# Patient Record
Sex: MALE | Race: WHITE | NOT HISPANIC OR LATINO | Employment: OTHER | URBAN - METROPOLITAN AREA
[De-identification: names, ages, dates, MRNs, and addresses within clinical notes are randomized per-mention and may not be internally consistent; named-entity substitution may affect disease eponyms.]

---

## 2018-09-20 DIAGNOSIS — I51.9 HEART DISEASE: ICD-10-CM

## 2018-09-27 LAB — EKG IMPRESSION: NORMAL

## 2019-12-03 ENCOUNTER — TELEPHONE (OUTPATIENT)
Dept: CARDIOLOGY | Facility: MEDICAL CENTER | Age: 64
End: 2019-12-03

## 2019-12-03 NOTE — TELEPHONE ENCOUNTER
Spoke with pt's wife who stated that the pt has seen two cardiac doctors but they aren't sure of the names but that they will try and remember by the time the appt is.

## 2019-12-09 ENCOUNTER — OFFICE VISIT (OUTPATIENT)
Dept: CARDIOLOGY | Facility: MEDICAL CENTER | Age: 64
End: 2019-12-09
Payer: MEDICARE

## 2019-12-09 VITALS
DIASTOLIC BLOOD PRESSURE: 89 MMHG | OXYGEN SATURATION: 94 % | BODY MASS INDEX: 22.57 KG/M2 | WEIGHT: 166.67 LBS | HEIGHT: 72 IN | HEART RATE: 65 BPM | SYSTOLIC BLOOD PRESSURE: 138 MMHG

## 2019-12-09 DIAGNOSIS — I10 HYPERTENSION, ESSENTIAL: ICD-10-CM

## 2019-12-09 DIAGNOSIS — E78.5 DYSLIPIDEMIA: ICD-10-CM

## 2019-12-09 DIAGNOSIS — I71.40 ABDOMINAL AORTIC ANEURYSM (AAA) WITHOUT RUPTURE (HCC): Chronic | ICD-10-CM

## 2019-12-09 DIAGNOSIS — N18.30 STAGE 3 CHRONIC KIDNEY DISEASE (HCC): ICD-10-CM

## 2019-12-09 DIAGNOSIS — I65.22 STENOSIS OF LEFT CAROTID ARTERY: ICD-10-CM

## 2019-12-09 DIAGNOSIS — I25.10 CORONARY ARTERY DISEASE INVOLVING NATIVE CORONARY ARTERY OF NATIVE HEART WITHOUT ANGINA PECTORIS: ICD-10-CM

## 2019-12-09 LAB — EKG IMPRESSION: NORMAL

## 2019-12-09 PROCEDURE — 99204 OFFICE O/P NEW MOD 45 MIN: CPT | Performed by: INTERNAL MEDICINE

## 2019-12-09 PROCEDURE — 93000 ELECTROCARDIOGRAM COMPLETE: CPT | Performed by: INTERNAL MEDICINE

## 2019-12-09 RX ORDER — RANITIDINE 150 MG/1
TABLET ORAL
COMMUNITY
Start: 2019-10-09 | End: 2023-09-22

## 2019-12-09 RX ORDER — ATORVASTATIN CALCIUM 40 MG/1
TABLET, FILM COATED ORAL
COMMUNITY
Start: 2019-10-16

## 2019-12-09 RX ORDER — CLOPIDOGREL BISULFATE 75 MG/1
TABLET ORAL
COMMUNITY
Start: 2019-10-16

## 2019-12-09 RX ORDER — CARVEDILOL 25 MG/1
25 TABLET ORAL 2 TIMES DAILY WITH MEALS
COMMUNITY

## 2019-12-09 RX ORDER — ISOSORBIDE MONONITRATE 120 MG/1
TABLET, EXTENDED RELEASE ORAL
COMMUNITY
Start: 2019-10-15

## 2019-12-09 RX ORDER — LEVOTHYROXINE SODIUM 112 UG/1
TABLET ORAL
COMMUNITY
Start: 2019-09-13

## 2019-12-09 RX ORDER — CLONIDINE HYDROCHLORIDE 0.3 MG/1
TABLET ORAL
COMMUNITY
Start: 2019-11-12

## 2019-12-09 RX ORDER — NITROGLYCERIN 0.4 MG/1
0.4 TABLET SUBLINGUAL
COMMUNITY

## 2019-12-09 RX ORDER — CHLORTHALIDONE 25 MG/1
TABLET ORAL
COMMUNITY
Start: 2019-09-11 | End: 2019-12-09

## 2019-12-09 RX ORDER — FERROUS SULFATE 325(65) MG
325 TABLET ORAL DAILY
COMMUNITY

## 2019-12-09 RX ORDER — MULTIVIT-MIN/IRON/FOLIC ACID/K 18-600-40
CAPSULE ORAL
COMMUNITY

## 2019-12-09 RX ORDER — NIFEDIPINE 30 MG/1
TABLET, EXTENDED RELEASE ORAL
COMMUNITY
Start: 2019-11-12

## 2019-12-09 NOTE — PROGRESS NOTES
CARDIOLOGY NEW PATIENT CONSULTATION    PCP: Pcp Pt States None    1. Coronary artery disease involving native coronary artery of native heart with other forms of angina pectoris  He has typical and atypical chest symptoms, most typical is the dyspnea on exertion  -Echo and stress MPI ordered  -Continue current medications  2. Abdominal aortic aneurysm (AAA) without rupture (HCC)  Comments: 4.3 cm November 2019  Surveillance imaging recommended annually    3. Hypertension, essential  Well-controlled.  Continue current medications.    4. Dyslipidemia  Well-controlled.  Continue atorvastatin 40 mg daily    5. Stage 3 chronic kidney disease (HCC)    6.  Carotid stenosis, left  - Carotid duplex ordered    7.  Claudication, bilateral  -Lower extremity duplex ordered    Follow up with González Ellsworth M.D. in 1 year    Chief Complaint   Patient presents with   • Abdominal Aortic Aneurysm       History: Pb Peters is a 64 y.o. male with a past medical history of hypertension, hyperlipidemia, former tobacco use, coronary artery disease and Peripheral artery disease/carotid stenosis presenting for consultation regarding abdominal aortic aneurysm.  He has been cared for by practitioners in [place] as well as [place].  He is now needing a cardiologist in [place].  Recently a 4.3 cm abdominal aortic aneurysm was identified.  He is not having any symptoms.  He does report coronary artery revascularization about 1 year ago and continues on dual antiplatelet therapy without any problems.  He does continue to experience chest pain when being emotionally stressed but also finds it difficult to walk more than 100 feet without shortness of breath.  He also feels bilateral calf pain when walking.  He reports a history of carotid stenosis which had been followed previously and a right carotid endarterectomy.    He is not having any orthopnea, PND, chest pain he does not report any new rashes or depression.    ROS:  All other systems  reviewed and negative except as per the HPI    PE:  /94 (BP Location: Right arm, Patient Position: Sitting, BP Cuff Size: Adult)   Pulse 65   Ht 1.829 m (6')   Wt 75.6 kg (166 lb 10.7 oz)   SpO2 94%   BMI 22.60 kg/m²   GEN: Well appearing  HEENT: Symmetric face. Anicteric sclerae. Moist mucus membranes  NECK: No JVD. No lymphadenopathy  CARDIAC: Normal PMI, regular, normal S1, S2.  VASCULATURE: Normal carotid amplitude without bruit.   RESP: Clear to auscultation bilaterally  ABD: Soft, non-tender, non-distended  EXT: No edema, no clubbing or cyanosis  SKIN: Warm and dry  NEURO: No gross deficits  PSYCH: Appropriate affect, participates in conversation    History reviewed. No pertinent past medical history.  History reviewed. No pertinent surgical history.  Allergies   Allergen Reactions   • Lisinopril      Per pt damages kidneys     Outpatient Encounter Medications as of 12/9/2019   Medication Sig Dispense Refill   • atorvastatin (LIPITOR) 40 MG Tab      • cloNIDine (CATAPRESS) 0.3 MG Tab      • clopidogrel (PLAVIX) 75 MG Tab      • isosorbide mononitrate (IMDUR) 120 MG CR tablet      • levothyroxine (SYNTHROID) 112 MCG Tab      • NIFEdipine SR (PROCARDIA-XL) 30 MG tablet      • raNITidine (ZANTAC) 150 MG Tab      • aspirin EC (ECOTRIN) 81 MG Tablet Delayed Response Take 81 mg by mouth every day.     • carvedilol (COREG) 25 MG Tab Take 25 mg by mouth 2 times a day, with meals.     • Umeclidinium Bromide (INCRUSE ELLIPTA) 62.5 MCG/INH AEROSOL POWDER, BREATH ACTIVATED Inhale  by mouth.     • nitroglycerin (NITROSTAT) 0.4 MG SL Tab Place 0.4 mg under tongue every 5 minutes as needed for Chest Pain.     • Cholecalciferol (VITAMIN D) 2000 units Cap Take  by mouth.     • ferrous sulfate 325 (65 Fe) MG tablet Take 325 mg by mouth every day.     • ACETAMINOPHEN PO Take  by mouth.     • [DISCONTINUED] chlorthalidone (HYGROTON) 25 MG Tab        No facility-administered encounter medications on file as of  12/9/2019.      Social History     Socioeconomic History   • Marital status: Unknown     Spouse name: Not on file   • Number of children: Not on file   • Years of education: Not on file   • Highest education level: Not on file   Occupational History   • Not on file   Social Needs   • Financial resource strain: Not on file   • Food insecurity:     Worry: Not on file     Inability: Not on file   • Transportation needs:     Medical: Not on file     Non-medical: Not on file   Tobacco Use   • Smoking status: Former Smoker     Packs/day: 0.00   • Smokeless tobacco: Never Used   Substance and Sexual Activity   • Alcohol use: Not on file   • Drug use: Not on file   • Sexual activity: Not on file   Lifestyle   • Physical activity:     Days per week: Not on file     Minutes per session: Not on file   • Stress: Not on file   Relationships   • Social connections:     Talks on phone: Not on file     Gets together: Not on file     Attends Tenriism service: Not on file     Active member of club or organization: Not on file     Attends meetings of clubs or organizations: Not on file     Relationship status: Not on file   • Intimate partner violence:     Fear of current or ex partner: Not on file     Emotionally abused: Not on file     Physically abused: Not on file     Forced sexual activity: Not on file   Other Topics Concern   • Not on file   Social History Narrative   • Not on file       History reviewed. No pertinent family history.      Studies  No results found for: CHOLSTRLTOT, LDL, HDL, TRIGLYCERIDE    No results found for: SODIUM, POTASSIUM, CHLORIDE, CO2, GLUCOSE, BUN, CREATININE, BUNCREATRAT, GLOMRATE  No results found for: ALKPHOSPHAT, ASTSGOT, ALTSGPT, TBILIRUBIN     For this encounter I directly reviewed ECG tracings and medical records I agree with the interpretations in the electronic health record

## 2019-12-17 ENCOUNTER — HOSPITAL ENCOUNTER (OUTPATIENT)
Dept: RADIOLOGY | Facility: MEDICAL CENTER | Age: 64
End: 2019-12-17

## 2020-01-06 ENCOUNTER — HOSPITAL ENCOUNTER (OUTPATIENT)
Dept: CARDIOLOGY | Facility: MEDICAL CENTER | Age: 65
End: 2020-01-06
Attending: INTERNAL MEDICINE
Payer: MEDICARE

## 2020-01-06 ENCOUNTER — HOSPITAL ENCOUNTER (OUTPATIENT)
Dept: RADIOLOGY | Facility: MEDICAL CENTER | Age: 65
End: 2020-01-06
Attending: INTERNAL MEDICINE
Payer: MEDICARE

## 2020-01-06 DIAGNOSIS — I71.40 ABDOMINAL AORTIC ANEURYSM (AAA) WITHOUT RUPTURE (HCC): Chronic | ICD-10-CM

## 2020-01-06 DIAGNOSIS — I10 HYPERTENSION, ESSENTIAL: ICD-10-CM

## 2020-01-06 DIAGNOSIS — I25.10 CORONARY ARTERY DISEASE INVOLVING NATIVE CORONARY ARTERY OF NATIVE HEART WITHOUT ANGINA PECTORIS: ICD-10-CM

## 2020-01-06 DIAGNOSIS — E78.5 DYSLIPIDEMIA: ICD-10-CM

## 2020-01-06 LAB
LV EJECT FRACT  99904: 60
LV EJECT FRACT MOD 2C 99903: 71.56
LV EJECT FRACT MOD 4C 99902: 73.57
LV EJECT FRACT MOD BP 99901: 71.09

## 2020-01-06 PROCEDURE — 78452 HT MUSCLE IMAGE SPECT MULT: CPT | Mod: 26 | Performed by: INTERNAL MEDICINE

## 2020-01-06 PROCEDURE — A9502 TC99M TETROFOSMIN: HCPCS

## 2020-01-06 PROCEDURE — 700111 HCHG RX REV CODE 636 W/ 250 OVERRIDE (IP)

## 2020-01-06 PROCEDURE — 93306 TTE W/DOPPLER COMPLETE: CPT | Mod: 26 | Performed by: INTERNAL MEDICINE

## 2020-01-06 PROCEDURE — 93306 TTE W/DOPPLER COMPLETE: CPT

## 2020-01-06 PROCEDURE — 93018 CV STRESS TEST I&R ONLY: CPT | Performed by: INTERNAL MEDICINE

## 2020-01-06 RX ORDER — REGADENOSON 0.08 MG/ML
0.4 INJECTION, SOLUTION INTRAVENOUS ONCE
Status: COMPLETED | OUTPATIENT
Start: 2020-01-06 | End: 2020-01-06

## 2020-01-06 RX ORDER — REGADENOSON 0.08 MG/ML
INJECTION, SOLUTION INTRAVENOUS
Status: COMPLETED
Start: 2020-01-06 | End: 2020-01-06

## 2020-01-06 RX ADMIN — REGADENOSON 0.4 MG: 0.08 INJECTION, SOLUTION INTRAVENOUS at 14:56

## 2020-01-07 ENCOUNTER — HOSPITAL ENCOUNTER (OUTPATIENT)
Dept: RADIOLOGY | Facility: MEDICAL CENTER | Age: 65
End: 2020-01-07
Attending: INTERNAL MEDICINE
Payer: MEDICARE

## 2020-01-07 ENCOUNTER — TELEPHONE (OUTPATIENT)
Dept: CARDIOLOGY | Facility: MEDICAL CENTER | Age: 65
End: 2020-01-07

## 2020-01-07 DIAGNOSIS — E78.5 DYSLIPIDEMIA: ICD-10-CM

## 2020-01-07 DIAGNOSIS — I65.22 STENOSIS OF LEFT CAROTID ARTERY: ICD-10-CM

## 2020-01-07 DIAGNOSIS — Z98.890 H/O CAROTID ENDARTERECTOMY: ICD-10-CM

## 2020-01-07 DIAGNOSIS — I71.40 ABDOMINAL AORTIC ANEURYSM (AAA) WITHOUT RUPTURE (HCC): ICD-10-CM

## 2020-01-07 DIAGNOSIS — I25.10 CORONARY ARTERY DISEASE INVOLVING NATIVE CORONARY ARTERY OF NATIVE HEART WITHOUT ANGINA PECTORIS: ICD-10-CM

## 2020-01-07 DIAGNOSIS — I71.40 ABDOMINAL AORTIC ANEURYSM (AAA) WITHOUT RUPTURE (HCC): Chronic | ICD-10-CM

## 2020-01-07 DIAGNOSIS — I10 HYPERTENSION, ESSENTIAL: ICD-10-CM

## 2020-01-07 PROCEDURE — 93922 UPR/L XTREMITY ART 2 LEVELS: CPT

## 2020-01-07 PROCEDURE — 93880 EXTRACRANIAL BILAT STUDY: CPT

## 2020-01-07 PROCEDURE — 93925 LOWER EXTREMITY STUDY: CPT

## 2020-01-07 NOTE — TELEPHONE ENCOUNTER
JENELLE Perez called from the vascular lab, patient is there now and she needs a new order for the carotid ultrasound. She said someone at the hospital accidentally cancelled the order. She said the appt is still there, but the order got cancelled. She would like to get it done today because the patient lives 4 hours away. She can be reached at [phone].

## 2020-01-07 NOTE — TELEPHONE ENCOUNTER
Per BE 12/9/19 note, carotid duplex was ordered for left carotid stenosis, but unable to see the order. Called Chris in vascular lab. She is unsure how the order was cancelled, but it appears someone on the inpatient side of things accidentally cancelled it. New order placed.

## 2020-01-08 PROCEDURE — 93880 EXTRACRANIAL BILAT STUDY: CPT | Mod: 26 | Performed by: INTERNAL MEDICINE

## 2020-01-08 PROCEDURE — 93922 UPR/L XTREMITY ART 2 LEVELS: CPT | Mod: 26 | Performed by: INTERNAL MEDICINE

## 2020-01-08 PROCEDURE — 93925 LOWER EXTREMITY STUDY: CPT | Mod: 26 | Performed by: INTERNAL MEDICINE

## 2022-01-18 DIAGNOSIS — R06.02 SOB (SHORTNESS OF BREATH): Primary | ICD-10-CM

## 2022-01-19 LAB — EKG IMPRESSION: NORMAL

## 2022-01-19 PROCEDURE — 93010 ELECTROCARDIOGRAM REPORT: CPT | Performed by: INTERNAL MEDICINE

## 2022-12-19 DIAGNOSIS — R06.02 SOB (SHORTNESS OF BREATH): Primary | ICD-10-CM

## 2022-12-21 LAB
EKG IMPRESSION: NORMAL
EKG IMPRESSION: NORMAL

## 2023-08-07 ENCOUNTER — HOSPITAL ENCOUNTER (OUTPATIENT)
Dept: RADIOLOGY | Facility: MEDICAL CENTER | Age: 68
End: 2023-08-07
Payer: MEDICARE

## 2023-08-29 ENCOUNTER — TELEPHONE (OUTPATIENT)
Dept: VASCULAR SURGERY | Facility: MEDICAL CENTER | Age: 68
End: 2023-08-29

## 2023-08-29 ENCOUNTER — OFFICE VISIT (OUTPATIENT)
Dept: VASCULAR SURGERY | Facility: MEDICAL CENTER | Age: 68
End: 2023-08-29
Payer: MEDICARE

## 2023-08-29 VITALS
OXYGEN SATURATION: 89 % | HEIGHT: 72 IN | SYSTOLIC BLOOD PRESSURE: 152 MMHG | WEIGHT: 181.22 LBS | HEART RATE: 60 BPM | TEMPERATURE: 97.6 F | DIASTOLIC BLOOD PRESSURE: 76 MMHG | BODY MASS INDEX: 24.55 KG/M2

## 2023-08-29 DIAGNOSIS — I71.40 ABDOMINAL AORTIC ANEURYSM (AAA) WITHOUT RUPTURE, UNSPECIFIED PART (HCC): ICD-10-CM

## 2023-08-29 DIAGNOSIS — E78.5 DYSLIPIDEMIA: ICD-10-CM

## 2023-08-29 DIAGNOSIS — I25.119 CORONARY ARTERY DISEASE INVOLVING NATIVE HEART WITH ANGINA PECTORIS, UNSPECIFIED VESSEL OR LESION TYPE (HCC): ICD-10-CM

## 2023-08-29 DIAGNOSIS — I10 PRIMARY HYPERTENSION: ICD-10-CM

## 2023-08-29 PROCEDURE — 3078F DIAST BP <80 MM HG: CPT | Performed by: SURGERY

## 2023-08-29 PROCEDURE — 3077F SYST BP >= 140 MM HG: CPT | Performed by: SURGERY

## 2023-08-29 PROCEDURE — 99204 OFFICE O/P NEW MOD 45 MIN: CPT | Performed by: SURGERY

## 2023-08-29 RX ORDER — CHLORTHALIDONE 25 MG/1
25 TABLET ORAL DAILY
COMMUNITY

## 2023-08-29 RX ORDER — FAMOTIDINE 40 MG/1
40 TABLET, FILM COATED ORAL DAILY
COMMUNITY

## 2023-08-29 RX ORDER — HYDRALAZINE HYDROCHLORIDE 10 MG/1
10 TABLET, FILM COATED ORAL 3 TIMES DAILY
COMMUNITY

## 2023-08-29 RX ORDER — FUROSEMIDE 80 MG
80 TABLET ORAL DAILY
COMMUNITY

## 2023-08-29 NOTE — TELEPHONE ENCOUNTER
Called daughter to let her know we got the stress test report from Dr. Cleveland's office and to not schedule the stress test that Dr. Funk ordered today. Both Patient and Daughter understood.

## 2023-08-30 ENCOUNTER — HOSPITAL ENCOUNTER (OUTPATIENT)
Dept: RADIOLOGY | Facility: MEDICAL CENTER | Age: 68
End: 2023-08-30
Attending: SURGERY
Payer: MEDICARE

## 2023-08-30 DIAGNOSIS — I71.40 ABDOMINAL AORTIC ANEURYSM (AAA) WITHOUT RUPTURE, UNSPECIFIED PART (HCC): ICD-10-CM

## 2023-08-30 PROCEDURE — 74176 CT ABD & PELVIS W/O CONTRAST: CPT

## 2023-08-30 NOTE — PROGRESS NOTES
VASCULAR SURGERY SERVICE  CONSULT NOTE      Date: 8/29/2023    Referring Provider: BRINA Rinaldi    Consulting Physician: Ruddy Funk MD - [facility]     -------------------------------------------------------------------------------------------------    Reason for consultation:  Abdominal aortic aneurysm.    HPI:  This is a 67 y.o. male with multiple medical problems including coronary artery disease, COPD on oxygen as needed, COVID infection x2, and abdominal aortic aneurysm who on recent ultrasound was found to have enlargement of the aneurysm to 6.6 x 4.1 cm in size.  Patient is referred to vascular service.  He denies abdominal pain.  He has chronic back pain.  He also have history of right carotid surgery 6 to 7 years ago.  Carotid duplex in January 2020 showed no significant stenosis on either side.  He has chronic left eye blindness.  He denies tobacco use.    Recent echocardiogram done in [place] showed ejection fraction of 50%, moderately severe aortic stenosis, diastolic dysfunction.  Nuclear medicine showed no fixed or reversible perfusion defect.    Past medical history: Hypertension, coronary artery disease, chronic back pain, COPD, dyslipidemia, and chronic kidney disease.    No past surgical history on file.    Current Outpatient Medications   Medication Sig Dispense Refill    chlorthalidone (HYGROTON) 25 MG Tab Take 25 mg by mouth every day.      famotidine (PEPCID) 40 MG Tab Take 40 mg by mouth every day.      furosemide (LASIX) 80 MG Tab Take 80 mg by mouth every day.      hydrALAZINE (APRESOLINE) 10 MG Tab Take 10 mg by mouth 3 times a day.      atorvastatin (LIPITOR) 40 MG Tab       cloNIDine (CATAPRESS) 0.3 MG Tab       clopidogrel (PLAVIX) 75 MG Tab       isosorbide mononitrate (IMDUR) 120 MG CR tablet       levothyroxine (SYNTHROID) 112 MCG Tab       NIFEdipine SR (PROCARDIA-XL) 30 MG tablet       carvedilol (COREG) 25 MG Tab Take 25 mg by mouth 2 times a day, with meals.       nitroglycerin (NITROSTAT) 0.4 MG SL Tab Place 0.4 mg under tongue every 5 minutes as needed for Chest Pain.      Cholecalciferol (VITAMIN D) 2000 units Cap Take  by mouth.      ferrous sulfate 325 (65 Fe) MG tablet Take 325 mg by mouth every day.      ACETAMINOPHEN PO Take  by mouth.      raNITidine (ZANTAC) 150 MG Tab  (Patient not taking: Reported on 8/29/2023)      aspirin EC (ECOTRIN) 81 MG Tablet Delayed Response Take 81 mg by mouth every day. (Patient not taking: Reported on 8/29/2023)      Umeclidinium Bromide (INCRUSE ELLIPTA) 62.5 MCG/INH AEROSOL POWDER, BREATH ACTIVATED Inhale  by mouth. (Patient not taking: Reported on 8/29/2023)       No current facility-administered medications for this visit.       Social History     Socioeconomic History    Marital status:      Spouse name: Not on file    Number of children: Not on file    Years of education: Not on file    Highest education level: Not on file   Occupational History    Not on file   Tobacco Use    Smoking status: Former     Types: Cigarettes    Smokeless tobacco: Never   Substance and Sexual Activity    Alcohol use: Not on file    Drug use: Not on file    Sexual activity: Not on file   Other Topics Concern    Not on file   Social History Narrative    Not on file     Social Determinants of Health     Financial Resource Strain: Not on file   Food Insecurity: Not on file   Transportation Needs: Not on file   Physical Activity: Not on file   Stress: Not on file   Social Connections: Not on file   Intimate Partner Violence: Not on file   Housing Stability: Not on file       No family history on file.    Allergies:  Lisinopril    Review of Systems:    Constitutional: Negative for fever, chills, weight loss,   HENT:   Negative for hearing loss or tinnitus    Eyes:    Positive for left visual deficits   Respiratory:  Negative for cough, hemoptysis, or wheezing    Cardiac:  Negative for chest pain or palpitations or orthopnea  Vascular:  Negative for  claudication or rest pain   Gastrointestinal: Negative for nausea, vomiting, or abdominal pain     Negative for hematochezia or melena   Genitourinary: Negative for dysuria, frequency, or hematuria   Musculoskeletal: Positive for chronic back pain   Skin:   Negative for itching or rash  Neurological:  Negative for dizziness, headaches, or tremors     Negative for speech disturbance     Negative for extremity weakness or paresthesias  Endo/Heme:  Negative for easy bruising or bleeding  Psychiatric:  Negative for depression, suicidal ideas, or hallucinations    Physical Exam:  BP (!) 152/76 (BP Location: Right arm, Patient Position: Sitting, BP Cuff Size: Adult)   Pulse 60   Temp 36.4 °C (97.6 °F) (Temporal)   Ht 1.829 m (6')   Wt 82.2 kg (181 lb 3.5 oz)   SpO2 89%     Constitutional: Unkempt male on oxygen with strong body odor.  Getting around using a wheelchair.  HEENT:  Normocephalic and atraumatic, EOMI  Neck:   Supple, no JVD, well-healed scars on right.  No bruits.  Cardiovascular: Regular rate and rhythm  Pulmonary:  Good air entry bilaterally  Abdominal:  Soft, non-tender, non-distended     Pulsatile mass, nontender.    Musculoskeletal: No edema, no tenderness  Neurological:  Left visual deficits  Skin:   Skin is warm and dry. No rash noted.  Psychiatric:  Normal mood and affect.  Lower extremities: Palpable bilateral femoral pulses, right stronger than left.  Pedal pulses are not palpable.  2-3+ bilateral lower legs edema.      Radiology:  Reviewed.    Assessment:  -Enlarging abdominal aortic aneurysm.  -COPD.  -Hypertension.  -Dyslipidemia.  -Poor personal hygiene.    Plan:  I had a long discussion with patient and his daughter.  I recommended that a CT scan of the abdomen and pelvis be done to better evaluate the aneurysm and plan for repair.  With him having chronic kidney disease, the CT scan would have to be done without contrast.  Patient and his daughter indicated understanding and agreed with  plan.  I ordered the CT scan to be done stat and have patient to see me after the study is done.  Patient and his daughter know to call 911 and have patient taken to the emergency room if he developed sudden onset of abdominal or new back pain at any time.  All questions were answered.      Ruddy Funk MD  [facility]   [place] preferred or call my office [phone]  __________________________________________________________________  Patient:Pb Peters   MRN:[id number]   CSN:[id number]    Addendum: CT performed on August 30, 2023 showed the aneurysm to be only 4 cm in size.  I contacted patient's daughter and inform her of the findings.  There is no indication for repair at this point.  I recommended that the patient has follow-up ultrasound every 6 months.  Patient's daughter know to call 911 and have patient taken to the emergency room if he developed sudden onset of abdominal or back pain at any time.    Since there is is a genetic component of aneurysm, I advised brittany's daughter to have patient's family members who are 60 years or older be screened for aneurysm with an ultrasound.  She indicated understanding.  I answered all of her questions.

## 2023-09-11 ENCOUNTER — TELEPHONE (OUTPATIENT)
Dept: CARDIOLOGY | Facility: MEDICAL CENTER | Age: 68
End: 2023-09-11
Payer: MEDICARE

## 2023-09-11 NOTE — TELEPHONE ENCOUNTER
Called and spoke to Polly in the filmroom. She states echo images were received and she will push to PACs.

## 2023-09-11 NOTE — TELEPHONE ENCOUNTER
Referral from: Tamika Cleveland FNP for Moderate-Severe AS    Unable to read sent echo report. Called and spoke to Christine at [facility]. Fax number verified. Christine states she will fax over report once request is received. She recommends reaching out to Bird ([phone]) at the hospital to request images.    Request for echo faxed to [phone]. Receipt confirmed.     Echo and stress test reports received and scanned into patient chart.     Called and spoke to Bird. He states he will push images to our facility for upload on PACS. Will check with filmroom later today.         Called and spoke with patient's daughter Devi.    Discussed referral, consultation appointment, and plan of care. Patient scheduled with Dr. Ellsworth.     All questions answered.    Phone number given to Devi for [facility] for any further questions or concerns.

## 2023-09-13 ENCOUNTER — TELEPHONE (OUTPATIENT)
Dept: CARDIOLOGY | Facility: MEDICAL CENTER | Age: 68
End: 2023-09-13
Payer: MEDICARE

## 2023-09-13 NOTE — TELEPHONE ENCOUNTER
Received call from patient's daughter, Devi. Appointment with Dr. Ellsworth rescheduled for 09/22/2023.

## 2023-09-20 ENCOUNTER — APPOINTMENT (OUTPATIENT)
Dept: CARDIOLOGY | Facility: MEDICAL CENTER | Age: 68
End: 2023-09-20
Attending: INTERNAL MEDICINE
Payer: MEDICARE

## 2023-09-22 ENCOUNTER — OFFICE VISIT (OUTPATIENT)
Dept: CARDIOLOGY | Facility: MEDICAL CENTER | Age: 68
End: 2023-09-22
Attending: INTERNAL MEDICINE
Payer: MEDICARE

## 2023-09-22 VITALS
BODY MASS INDEX: 24.52 KG/M2 | HEART RATE: 64 BPM | DIASTOLIC BLOOD PRESSURE: 70 MMHG | SYSTOLIC BLOOD PRESSURE: 118 MMHG | WEIGHT: 181 LBS | RESPIRATION RATE: 18 BRPM | HEIGHT: 72 IN | OXYGEN SATURATION: 94 %

## 2023-09-22 DIAGNOSIS — I71.43 INFRARENAL ABDOMINAL AORTIC ANEURYSM (AAA) WITHOUT RUPTURE (HCC): ICD-10-CM

## 2023-09-22 DIAGNOSIS — Z01.810 PREOPERATIVE CARDIOVASCULAR EXAMINATION: ICD-10-CM

## 2023-09-22 DIAGNOSIS — I10 HYPERTENSION, ESSENTIAL: ICD-10-CM

## 2023-09-22 DIAGNOSIS — I35.0 NONRHEUMATIC AORTIC VALVE STENOSIS: ICD-10-CM

## 2023-09-22 DIAGNOSIS — I25.84 CORONARY ARTERY DISEASE DUE TO CALCIFIED CORONARY LESION: ICD-10-CM

## 2023-09-22 DIAGNOSIS — E78.5 DYSLIPIDEMIA: ICD-10-CM

## 2023-09-22 DIAGNOSIS — I65.22 STENOSIS OF LEFT CAROTID ARTERY: ICD-10-CM

## 2023-09-22 DIAGNOSIS — I25.10 CORONARY ARTERY DISEASE DUE TO CALCIFIED CORONARY LESION: ICD-10-CM

## 2023-09-22 DIAGNOSIS — N18.31 STAGE 3A CHRONIC KIDNEY DISEASE: ICD-10-CM

## 2023-09-22 PROCEDURE — 3078F DIAST BP <80 MM HG: CPT | Performed by: INTERNAL MEDICINE

## 2023-09-22 PROCEDURE — 99205 OFFICE O/P NEW HI 60 MIN: CPT | Performed by: INTERNAL MEDICINE

## 2023-09-22 PROCEDURE — 3074F SYST BP LT 130 MM HG: CPT | Performed by: INTERNAL MEDICINE

## 2023-09-22 PROCEDURE — 99213 OFFICE O/P EST LOW 20 MIN: CPT | Performed by: INTERNAL MEDICINE

## 2023-09-22 PROCEDURE — 93005 ELECTROCARDIOGRAM TRACING: CPT | Performed by: INTERNAL MEDICINE

## 2023-09-22 NOTE — PROGRESS NOTES
CARDIOLOGY OUTPATIENT FOLLOWUP    PCP: CAROLINA Simpson    1. Nonrheumatic aortic valve stenosis    2. Hypertension, essential    3. Preoperative cardiovascular examination    4. Infrarenal abdominal aortic aneurysm (AAA) without rupture (HCC)    5. Stage 3a chronic kidney disease (HCC)    6. Dyslipidemia    7. Coronary artery disease due to calcified coronary lesion    8. Stenosis of left carotid artery        Pb Peters has symptoms of exertional fatigue and chest discomfort though largely stable.  There is no auscultatory evidence of aortic stenosis and the echocardiogram report has important inconsistencies which raised out to the reality of stenosis in the aortic valve.  As he is having symptoms potentially consistent with angina and/or aortic valve disease-both potentially life-threatening conditions, I think the most practical approach to assessment is with an invasive angiogram as well as aortic valve study.  During this procedure I could also perform a carotid angiogram as his records of the study have been lost as well.    He is at low risk for perioperative complications during low risk cataract surgery.  He does not need to delay cataract surgery until cardiac evaluation is complete.    Follow up: 1 year    History: Pb Peters is a 68 y.o. male with history of coronary stent x3, abdominal aortic aneurysm-4.1 cm, chronic kidney disease, carotid disease presenting for assessment of aortic stenosis.  He lives in [place] where he receives much of his medical care.  Although previously had coronary stents placed while living in [place].  The last was 2012.    He has been under consideration for cataract surgery and underwent extensive testing preoperatively.  He does report chest discomfort upon exertion which has been more pronounced recently as well as a more chronic fatigue and dyspnea upon exertion.  He underwent an echocardiogram which showed moderate aortic stenosis with a valve area of  "just over 1 cm².  Curiously aortic valve V-max was noted to be just about 1.      Physical Exam:  /70 (BP Location: Left arm, Patient Position: Sitting, BP Cuff Size: Adult)   Pulse 64   Resp 18   Ht 1.829 m (6')   Wt 82.1 kg (181 lb)   SpO2 94%   BMI 24.55 kg/m²   GEN: NAD  RESP: CTAB  CVS: RRR, No M/R/G  ABD: Soft, NT/ND  EXT: WWP, no edema    The ASCVD Risk score (Stalin WARD, et al., 2019) failed to calculate.      I reviewed the results of echocardiogram and stress test findings which show overall preserved ventricular function-raise the concern for aortic stenosis (inconsistencies as described above.    I also reviewed the CT images-directly interpreted-which do show a small abdominal aortic aneurysm.    Studies  No results found for: \"CHOLSTRLTOT\", \"LDL\", \"HDL\", \"TRIGLYCERIDE\"    No results found for: \"SODIUM\", \"POTASSIUM\", \"CHLORIDE\", \"CO2\", \"GLUCOSE\", \"BUN\", \"CREATININE\", \"BUNCREATRAT\", \"GLOMRATE\"  No results found for: \"ALKPHOSPHAT\", \"ASTSGOT\", \"ALTSGPT\", \"TBILIRUBIN\"     No past medical history on file.  Allergies   Allergen Reactions    Lisinopril      Per pt damages kidneys     Outpatient Encounter Medications as of 9/22/2023   Medication Sig Dispense Refill    chlorthalidone (HYGROTON) 25 MG Tab Take 25 mg by mouth every day.      famotidine (PEPCID) 40 MG Tab Take 40 mg by mouth every day.      furosemide (LASIX) 80 MG Tab Take 80 mg by mouth every day.      hydrALAZINE (APRESOLINE) 10 MG Tab Take 10 mg by mouth 3 times a day.      atorvastatin (LIPITOR) 40 MG Tab       cloNIDine (CATAPRESS) 0.3 MG Tab       clopidogrel (PLAVIX) 75 MG Tab       isosorbide mononitrate (IMDUR) 120 MG CR tablet       levothyroxine (SYNTHROID) 112 MCG Tab       NIFEdipine SR (PROCARDIA-XL) 30 MG tablet       carvedilol (COREG) 25 MG Tab Take 25 mg by mouth 2 times a day, with meals.      nitroglycerin (NITROSTAT) 0.4 MG SL Tab Place 0.4 mg under tongue every 5 minutes as needed for Chest Pain.      " Cholecalciferol (VITAMIN D) 2000 units Cap Take  by mouth.      ferrous sulfate 325 (65 Fe) MG tablet Take 325 mg by mouth every day.      ACETAMINOPHEN PO Take  by mouth.      [DISCONTINUED] raNITidine (ZANTAC) 150 MG Tab  (Patient not taking: Reported on 8/29/2023)      [DISCONTINUED] aspirin EC (ECOTRIN) 81 MG Tablet Delayed Response Take 81 mg by mouth every day. (Patient not taking: Reported on 8/29/2023)      [DISCONTINUED] Umeclidinium Bromide (INCRUSE ELLIPTA) 62.5 MCG/INH AEROSOL POWDER, BREATH ACTIVATED Inhale  by mouth. (Patient not taking: Reported on 8/29/2023)       No facility-administered encounter medications on file as of 9/22/2023.     Social History     Socioeconomic History    Marital status:      Spouse name: Not on file    Number of children: Not on file    Years of education: Not on file    Highest education level: Not on file   Occupational History    Not on file   Tobacco Use    Smoking status: Former     Types: Cigarettes    Smokeless tobacco: Never   Substance and Sexual Activity    Alcohol use: Not on file    Drug use: Not on file    Sexual activity: Not on file   Other Topics Concern    Not on file   Social History Narrative    Not on file     Social Determinants of Health     Financial Resource Strain: Not on file   Food Insecurity: Not on file   Transportation Needs: Not on file   Physical Activity: Not on file   Stress: Not on file   Social Connections: Not on file   Intimate Partner Violence: Not on file   Housing Stability: Not on file         ROS:   10 point review systems is otherwise negative except as per the HPI    No chief complaint on file.

## 2023-09-25 ENCOUNTER — TELEPHONE (OUTPATIENT)
Dept: CARDIOLOGY | Facility: MEDICAL CENTER | Age: 68
End: 2023-09-25
Payer: MEDICARE

## 2023-09-25 LAB — EKG IMPRESSION: NORMAL

## 2023-09-25 PROCEDURE — 93010 ELECTROCARDIOGRAM REPORT: CPT | Performed by: INTERNAL MEDICINE

## 2023-09-27 ENCOUNTER — HOSPITAL ENCOUNTER (OUTPATIENT)
Facility: MEDICAL CENTER | Age: 68
End: 2023-09-27
Attending: INTERNAL MEDICINE | Admitting: INTERNAL MEDICINE
Payer: MEDICARE

## 2023-09-27 NOTE — TELEPHONE ENCOUNTER
Per patients request, Patient is scheduled on 11-6-23 for a R&L hrt with . Patient was told to hold lasix AM day of procedure and to check in at 9:30 for an 11:30 procedure. Updated H&P to be done on admit by NP. Pre admit to call patient. Sent to auth in teams.

## 2023-10-17 ENCOUNTER — TELEPHONE (OUTPATIENT)
Dept: CARDIOLOGY | Facility: MEDICAL CENTER | Age: 68
End: 2023-10-17
Payer: MEDICARE

## 2023-10-17 NOTE — TELEPHONE ENCOUNTER
BE     Caller: Daria @ [facility] Pre Registration     Topic/issue: They are having a hard time reaching patient for pre registration and wanted to give a heads up.     Thank You   Juani CHUA

## 2023-10-17 NOTE — TELEPHONE ENCOUNTER
Phone Number Called: [phone]    Call outcome:  spoke to Daria from pre-registration    Message: Pre-registration having difficulty scheduling appointment with patient. [employer] message sent to patient to call pre-registration by this RN as well as attempted to call patient but line is disconnected.     This RN attempted to call patient daughter as well with no answer as well.

## 2023-11-01 NOTE — TELEPHONE ENCOUNTER
Per patient and daughter's request, patient has been rescheduled to 12-7-23 for R&L hrt with . Patient was told to hold lasix AM day of procedure and to check in at 9:30 for an 11:30 procedure. Updated H&P to be done on admit by NP. Pre admit to call patient.

## 2023-12-04 NOTE — TELEPHONE ENCOUNTER
Patient has been rescheduled to 1-9-24 for a R&L hrt with . Patient was told to hold lasix AM day of procedure. Patient to check in at 9:30 for an 11:30 procedure. Upadated H&P to be done on admit by NP. Pre admit to call patient.

## 2023-12-04 NOTE — TELEPHONE ENCOUNTER
Patients daughter Roseline called and LM that she needs to r/s [place] procedure on 12-7-23. LM for pt to call back to reschedule.

## 2023-12-06 ENCOUNTER — HOSPITAL ENCOUNTER (OUTPATIENT)
Facility: MEDICAL CENTER | Age: 68
End: 2023-12-06
Payer: MEDICARE

## 2023-12-07 ENCOUNTER — APPOINTMENT (OUTPATIENT)
Dept: CARDIOLOGY | Facility: MEDICAL CENTER | Age: 68
End: 2023-12-07
Attending: INTERNAL MEDICINE
Payer: MEDICARE

## 2023-12-08 ENCOUNTER — TELEPHONE (OUTPATIENT)
Dept: CARDIOLOGY | Facility: MEDICAL CENTER | Age: 68
End: 2023-12-08
Payer: MEDICARE

## 2023-12-08 NOTE — TELEPHONE ENCOUNTER
Per Patients daughter, patients primary Cardiologists in [facility] Jasmyne Cleveland told patient he didn't need to have the angiogram done anymore and they cancelled it.

## 2023-12-08 NOTE — TELEPHONE ENCOUNTER
Elijah Perez,     No this is not true. This issue might be that pre-registration was having difficulties reaching the patient so they may have reached out to the daughter saying the procedure would be cancelled since they cannot get in contact with the patient?     That is the only thing I can think of but as far as I know the patient still needs to have the procedure.     Thank you!     Romana

## 2023-12-08 NOTE — TELEPHONE ENCOUNTER
----- Message from Ana Palomares sent at 12/8/2023  3:16 PM PST -----  Regarding: Angio?  I just got a VM from this patients daughter saying she got  call from Cardiology saying her dad doesn't need to have his angiogram anymore. Is this true, can I cancelled it?

## 2024-01-09 ENCOUNTER — APPOINTMENT (OUTPATIENT)
Dept: CARDIOLOGY | Facility: MEDICAL CENTER | Age: 69
End: 2024-01-09
Attending: INTERNAL MEDICINE
Payer: MEDICARE

## 2024-01-14 ENCOUNTER — HOSPITAL ENCOUNTER (OUTPATIENT)
Dept: RADIOLOGY | Facility: MEDICAL CENTER | Age: 69
End: 2024-01-14
Payer: MEDICARE

## 2024-01-14 ENCOUNTER — DOCUMENTATION (OUTPATIENT)
Dept: SLEEP MEDICINE | Facility: MEDICAL CENTER | Age: 69
End: 2024-01-14
Payer: MEDICARE

## 2024-01-14 NOTE — PROGRESS NOTES
Call received for possible transfer for bronchoscopy.  Pulmonary Transfer Request  Patient is described to me as a very weak and deconditioned 68 year old male who was initially admitted to another hospital on the 9th after family had flu-like symptoms and he developed increased shortness of breath.  He has a significant cardiac history with CAD s/p 3 stents in 2015, COPD, severe CKD with current THERESA on CKD, Hypothyroidism and diastolic heart failure (recent EF 61%).  He was initially fluid overloaded with a BNP of 1200 and was placed on lasix which has recently been stopped and maintenance fluids have been started.  Significant renal failure with  and creatinine in the 3's (baseline 2's).  He was noted to have a right basliar consolidation on CXR and was saturating in the 90's on 6L. He has been treated with CAP coverage which was recently broadened to zosyn from ceftriaxone.  He tested positive for Flu B at  hospital and has been treated with tamiflu and steroids for his COPD exacerbation as a result of flu infection.  Today he had an episode during CPT where he desaturated significantly and was placed on BIPAP at 70% FiO2 with pressure support 10/6 and is saturating in the low 90's.    Imaging reviewed: CT scan from 1/12 with mild airway thickening, worse at bases and consistent with possible chronic aspiration or mild pneumonia.  There is some very mild distal atelectasis.  CXR from today appears to demonstrate the same finding.  There is no sign of mucus plug or lobar collapse on my review.  In fact, the CT scan looks remarkably good for the reported condition of the patient.  Unfortunately without significant lobar collapse a bronchoscopy would offer no benefit and would require this gentleman to be intubated which may be a terminal situation given the condition described to me by the physician at .  Bronchoscopy would not provide information that would significantly change the management of his  patient as he is already on broad spectrum antibiotic coverage with PCR positive for influenza.      Recommend:  - Continue supportive care  - Agree with tamiflu  - Please reach out if CXR develops significant lobar collapse for reconsideration  - I would strongly recommend removing IVF from this patient  - Agree with completing antibiotic coverage for now due to his condition although I do not see a clear consolidation on CT.   - No current role for bronchoscopy and therefore no need for transfer    Landy Tran MD RD  Pulmonary and Critical Care    Available on Voalte

## 2024-08-15 ENCOUNTER — TELEPHONE (OUTPATIENT)
Dept: HEALTH INFORMATION MANAGEMENT | Facility: OTHER | Age: 69
End: 2024-08-15

## 2024-12-11 DIAGNOSIS — R56.9 SEIZURE (HCC): Primary | ICD-10-CM

## 2024-12-11 LAB — EKG IMPRESSION: NORMAL

## 2024-12-11 PROCEDURE — 93010 ELECTROCARDIOGRAM REPORT: CPT | Performed by: INTERNAL MEDICINE

## 2025-01-28 ENCOUNTER — TELEPHONE (OUTPATIENT)
Dept: CARDIOLOGY | Facility: MEDICAL CENTER | Age: 70
End: 2025-01-28
Payer: MEDICARE

## 2025-01-29 ENCOUNTER — HOSPITAL ENCOUNTER (INPATIENT)
Facility: MEDICAL CENTER | Age: 70
End: 2025-01-29
Attending: INTERNAL MEDICINE | Admitting: INTERNAL MEDICINE
Payer: MEDICARE

## 2025-01-29 ENCOUNTER — HOSPITAL ENCOUNTER (OUTPATIENT)
Dept: RADIOLOGY | Facility: MEDICAL CENTER | Age: 70
End: 2025-01-29

## 2025-01-29 ENCOUNTER — APPOINTMENT (OUTPATIENT)
Dept: RADIOLOGY | Facility: MEDICAL CENTER | Age: 70
DRG: 682 | End: 2025-01-29
Attending: EMERGENCY MEDICINE
Payer: MEDICARE

## 2025-01-29 DIAGNOSIS — I50.30 HEART FAILURE WITH PRESERVED EJECTION FRACTION, UNSPECIFIED HF CHRONICITY (HCC): ICD-10-CM

## 2025-01-29 DIAGNOSIS — N18.31 STAGE 3A CHRONIC KIDNEY DISEASE: ICD-10-CM

## 2025-01-29 DIAGNOSIS — N18.9 ACUTE RENAL FAILURE SUPERIMPOSED ON CHRONIC KIDNEY DISEASE, UNSPECIFIED ACUTE RENAL FAILURE TYPE, UNSPECIFIED CKD STAGE (HCC): ICD-10-CM

## 2025-01-29 DIAGNOSIS — N17.9 ACUTE RENAL FAILURE SUPERIMPOSED ON CHRONIC KIDNEY DISEASE, UNSPECIFIED ACUTE RENAL FAILURE TYPE, UNSPECIFIED CKD STAGE (HCC): ICD-10-CM

## 2025-01-29 PROBLEM — E87.5 HYPERKALEMIA: Status: ACTIVE | Noted: 2025-01-29

## 2025-01-29 PROBLEM — G92.8 TOXIC METABOLIC ENCEPHALOPATHY: Status: ACTIVE | Noted: 2025-01-29

## 2025-01-29 PROBLEM — J96.11 CHRONIC HYPOXEMIC RESPIRATORY FAILURE (HCC): Status: ACTIVE | Noted: 2025-01-29

## 2025-01-29 LAB
ANION GAP SERPL CALC-SCNC: 12 MMOL/L (ref 7–16)
ANISOCYTOSIS BLD QL SMEAR: ABNORMAL
BASE EXCESS BLDA CALC-SCNC: -1 MMOL/L (ref -4–3)
BASE EXCESS BLDA CALC-SCNC: 0 MMOL/L (ref -4–3)
BASOPHILS # BLD AUTO: 0.2 % (ref 0–1.8)
BASOPHILS # BLD: 0.02 K/UL (ref 0–0.12)
BODY TEMPERATURE: ABNORMAL DEGREES
BODY TEMPERATURE: ABNORMAL DEGREES
BUN SERPL-MCNC: 88 MG/DL (ref 8–22)
CALCIUM SERPL-MCNC: 8.5 MG/DL (ref 8.5–10.5)
CHLORIDE SERPL-SCNC: 100 MMOL/L (ref 96–112)
CO2 BLDA-SCNC: 29 MMOL/L (ref 32–48)
CO2 BLDA-SCNC: 31 MMOL/L (ref 32–48)
CO2 SERPL-SCNC: 25 MMOL/L (ref 20–33)
COMMENT 1642: NORMAL
CREAT SERPL-MCNC: 6.19 MG/DL (ref 0.5–1.4)
DELSYS IDSYS: ABNORMAL
DELSYS IDSYS: ABNORMAL
EOSINOPHIL # BLD AUTO: 0.26 K/UL (ref 0–0.51)
EOSINOPHIL NFR BLD: 3 % (ref 0–6.9)
ERYTHROCYTE [DISTWIDTH] IN BLOOD BY AUTOMATED COUNT: 66.5 FL (ref 35.9–50)
GFR SERPLBLD CREATININE-BSD FMLA CKD-EPI: 9 ML/MIN/1.73 M 2
GLUCOSE SERPL-MCNC: 113 MG/DL (ref 65–99)
HCO3 BLDA-SCNC: 27.5 MMOL/L (ref 21–28)
HCO3 BLDA-SCNC: 29.1 MMOL/L (ref 21–28)
HCT VFR BLD AUTO: 33.5 % (ref 42–52)
HGB BLD-MCNC: 10.1 G/DL (ref 14–18)
HOROWITZ INDEX BLDA+IHG-RTO: 168 MM[HG]
HOROWITZ INDEX BLDA+IHG-RTO: 184 MM[HG]
HYPOCHROMIA BLD QL SMEAR: ABNORMAL
IMM GRANULOCYTES # BLD AUTO: 0.23 K/UL (ref 0–0.11)
IMM GRANULOCYTES NFR BLD AUTO: 2.6 % (ref 0–0.9)
LACTATE BLD-SCNC: <0.3 MMOL/L (ref 0.5–2)
LACTATE BLD-SCNC: <0.3 MMOL/L (ref 0.5–2)
LYMPHOCYTES # BLD AUTO: 0.99 K/UL (ref 1–4.8)
LYMPHOCYTES NFR BLD: 11.2 % (ref 22–41)
MACROCYTES BLD QL SMEAR: ABNORMAL
MCH RBC QN AUTO: 30.3 PG (ref 27–33)
MCHC RBC AUTO-ENTMCNC: 30.1 G/DL (ref 32.3–36.5)
MCV RBC AUTO: 100.6 FL (ref 81.4–97.8)
MONOCYTES # BLD AUTO: 1.06 K/UL (ref 0–0.85)
MONOCYTES NFR BLD AUTO: 12 % (ref 0–13.4)
MORPHOLOGY BLD-IMP: NORMAL
NEUTROPHILS # BLD AUTO: 6.25 K/UL (ref 1.82–7.42)
NEUTROPHILS NFR BLD: 71 % (ref 44–72)
NRBC # BLD AUTO: 0 K/UL
NRBC BLD-RTO: 0 /100 WBC (ref 0–0.2)
O2/TOTAL GAS SETTING VFR VENT: 50 %
O2/TOTAL GAS SETTING VFR VENT: 60 %
PCO2 BLDA: 62.9 MMHG (ref 32–48)
PCO2 BLDA: 69.2 MMHG (ref 32–48)
PCO2 TEMP ADJ BLDA: 56.9 MMHG (ref 32–48)
PCO2 TEMP ADJ BLDA: 66.6 MMHG (ref 32–48)
PH BLDA: 7.23 [PH] (ref 7.35–7.45)
PH BLDA: 7.25 [PH] (ref 7.35–7.45)
PH TEMP ADJ BLDA: 7.24 [PH] (ref 7.35–7.45)
PH TEMP ADJ BLDA: 7.28 [PH] (ref 7.35–7.45)
PLATELET # BLD AUTO: 180 K/UL (ref 164–446)
PLATELET BLD QL SMEAR: NORMAL
PMV BLD AUTO: 11.1 FL (ref 9–12.9)
PO2 BLDA: 101 MMHG (ref 83–108)
PO2 BLDA: 92 MMHG (ref 83–108)
PO2 TEMP ADJ BLDA: 80 MMHG (ref 83–108)
PO2 TEMP ADJ BLDA: 95 MMHG (ref 83–108)
POTASSIUM SERPL-SCNC: 7.9 MMOL/L (ref 3.6–5.5)
RBC # BLD AUTO: 3.33 M/UL (ref 4.7–6.1)
RBC BLD AUTO: PRESENT
SAO2 % BLDA: 95 % (ref 93–99)
SAO2 % BLDA: 96 % (ref 93–99)
SODIUM SERPL-SCNC: 137 MMOL/L (ref 135–145)
SPECIMEN DRAWN FROM PATIENT: ABNORMAL
SPECIMEN DRAWN FROM PATIENT: ABNORMAL
WBC # BLD AUTO: 8.8 K/UL (ref 4.8–10.8)

## 2025-01-29 PROCEDURE — 5A1D70Z PERFORMANCE OF URINARY FILTRATION, INTERMITTENT, LESS THAN 6 HOURS PER DAY: ICD-10-PCS | Performed by: INTERNAL MEDICINE

## 2025-01-29 PROCEDURE — 80048 BASIC METABOLIC PNL TOTAL CA: CPT

## 2025-01-29 PROCEDURE — 36556 INSERT NON-TUNNEL CV CATH: CPT

## 2025-01-29 PROCEDURE — 87040 BLOOD CULTURE FOR BACTERIA: CPT | Mod: 91

## 2025-01-29 PROCEDURE — 770022 HCHG ROOM/CARE - ICU (200)

## 2025-01-29 PROCEDURE — 90935 HEMODIALYSIS ONE EVALUATION: CPT

## 2025-01-29 PROCEDURE — 36556 INSERT NON-TUNNEL CV CATH: CPT | Mod: GC | Performed by: EMERGENCY MEDICINE

## 2025-01-29 PROCEDURE — 36600 WITHDRAWAL OF ARTERIAL BLOOD: CPT

## 2025-01-29 PROCEDURE — 83605 ASSAY OF LACTIC ACID: CPT | Mod: 91

## 2025-01-29 PROCEDURE — 02HV33Z INSERTION OF INFUSION DEVICE INTO SUPERIOR VENA CAVA, PERCUTANEOUS APPROACH: ICD-10-PCS | Performed by: EMERGENCY MEDICINE

## 2025-01-29 PROCEDURE — 82803 BLOOD GASES ANY COMBINATION: CPT

## 2025-01-29 PROCEDURE — 94660 CPAP INITIATION&MGMT: CPT

## 2025-01-29 PROCEDURE — 71045 X-RAY EXAM CHEST 1 VIEW: CPT

## 2025-01-29 PROCEDURE — 99291 CRITICAL CARE FIRST HOUR: CPT | Mod: 25,GC | Performed by: EMERGENCY MEDICINE

## 2025-01-29 PROCEDURE — 700111 HCHG RX REV CODE 636 W/ 250 OVERRIDE (IP): Performed by: INTERNAL MEDICINE

## 2025-01-29 PROCEDURE — 700105 HCHG RX REV CODE 258: Performed by: INTERNAL MEDICINE

## 2025-01-29 PROCEDURE — 85025 COMPLETE CBC W/AUTO DIFF WBC: CPT

## 2025-01-29 PROCEDURE — 700101 HCHG RX REV CODE 250: Performed by: INTERNAL MEDICINE

## 2025-01-29 PROCEDURE — 700111 HCHG RX REV CODE 636 W/ 250 OVERRIDE (IP)

## 2025-01-29 PROCEDURE — C1752 CATH,HEMODIALYSIS,SHORT-TERM: HCPCS

## 2025-01-29 PROCEDURE — 80074 ACUTE HEPATITIS PANEL: CPT

## 2025-01-29 RX ORDER — HEPARIN SODIUM 1000 [USP'U]/ML
1200 INJECTION, SOLUTION INTRAVENOUS; SUBCUTANEOUS
Status: DISCONTINUED | OUTPATIENT
Start: 2025-01-29 | End: 2025-02-11

## 2025-01-29 RX ORDER — NOREPINEPHRINE BITARTRATE 0.03 MG/ML
0-1 INJECTION, SOLUTION INTRAVENOUS CONTINUOUS
Status: DISCONTINUED | OUTPATIENT
Start: 2025-01-29 | End: 2025-02-01

## 2025-01-29 RX ORDER — HEPARIN SODIUM 1000 [USP'U]/ML
1250 INJECTION, SOLUTION INTRAVENOUS; SUBCUTANEOUS ONCE
Status: COMPLETED | OUTPATIENT
Start: 2025-01-29 | End: 2025-01-29

## 2025-01-29 RX ORDER — AMOXICILLIN 250 MG
2 CAPSULE ORAL EVERY EVENING
Status: DISCONTINUED | OUTPATIENT
Start: 2025-01-30 | End: 2025-02-12 | Stop reason: HOSPADM

## 2025-01-29 RX ORDER — CLOPIDOGREL BISULFATE 75 MG/1
75 TABLET ORAL DAILY
Status: DISCONTINUED | OUTPATIENT
Start: 2025-01-30 | End: 2025-02-12 | Stop reason: HOSPADM

## 2025-01-29 RX ORDER — SODIUM CHLORIDE 9 MG/ML
250 INJECTION, SOLUTION INTRAVENOUS
Status: DISCONTINUED | OUTPATIENT
Start: 2025-01-29 | End: 2025-02-11

## 2025-01-29 RX ORDER — HEPARIN SODIUM 5000 [USP'U]/ML
5000 INJECTION, SOLUTION INTRAVENOUS; SUBCUTANEOUS EVERY 8 HOURS
Status: DISCONTINUED | OUTPATIENT
Start: 2025-01-29 | End: 2025-02-12 | Stop reason: HOSPADM

## 2025-01-29 RX ORDER — HEPARIN SODIUM 1000 [USP'U]/ML
500 INJECTION, SOLUTION INTRAVENOUS; SUBCUTANEOUS
Status: DISCONTINUED | OUTPATIENT
Start: 2025-01-29 | End: 2025-02-11

## 2025-01-29 RX ORDER — HEPARIN SODIUM 1000 [USP'U]/ML
INJECTION, SOLUTION INTRAVENOUS; SUBCUTANEOUS
Status: COMPLETED
Start: 2025-01-29 | End: 2025-01-29

## 2025-01-29 RX ORDER — POLYETHYLENE GLYCOL 3350 17 G/17G
1 POWDER, FOR SOLUTION ORAL
Status: DISCONTINUED | OUTPATIENT
Start: 2025-01-29 | End: 2025-02-12 | Stop reason: HOSPADM

## 2025-01-29 RX ORDER — LEVOTHYROXINE SODIUM 112 UG/1
112 TABLET ORAL
Status: DISCONTINUED | OUTPATIENT
Start: 2025-01-30 | End: 2025-02-04

## 2025-01-29 RX ADMIN — HEPARIN SODIUM 1300 UNITS: 1000 INJECTION, SOLUTION INTRAVENOUS; SUBCUTANEOUS at 23:38

## 2025-01-29 RX ADMIN — HEPARIN SODIUM 500 UNITS: 1000 INJECTION, SOLUTION INTRAVENOUS; SUBCUTANEOUS at 23:43

## 2025-01-29 RX ADMIN — NOREPINEPHRINE BITARTRATE 0.1 MCG/KG/MIN: 1 INJECTION, SOLUTION, CONCENTRATE INTRAVENOUS at 20:30

## 2025-01-29 RX ADMIN — HEPARIN SODIUM 5000 UNITS: 5000 INJECTION, SOLUTION INTRAVENOUS; SUBCUTANEOUS at 22:26

## 2025-01-29 ASSESSMENT — PULMONARY FUNCTION TESTS
EPAP_CMH2O: 5
EPAP_CMH2O: 8

## 2025-01-29 NOTE — PROGRESS NOTES
[facility] INTENSIVIST DIRECT ADMISSION REPORT    Transferring facility: [facility]  Transferring physician: Dr Milligan  Chief complaint: AMS  Pertinent history & patient course: 69-year-old male with past medical history of HFpEF, COPD on 6L NC at baseline, CKD stage IV, infrarenal AAA, blindness who lives in a skilled nursing facility in [place].  Over the last several days has been more confused with generalized weakness.  He was sent to the ER today where he was found to be tachypneic with a normal heart rate and blood pressure.  Labs showed acute on chronic kidney disease with a creatinine of 6.57 and a potassium of 7.8.  He was given calcium, albuterol and insulin.  He became mildly hypotensive therefore was given 2 L of NS and a potassium was repeated which was 7.6.  Calcium and albuterol have been repeated.  Last VBG showed a pH of 7.1, pCO2 74, PaO2 80, bicarb 27, base excess -2.  A CT of the head was performed for his altered mental status which showed no acute abnormalities, CT of his abdomen showed no obstructing stone however atrophied kidneys and stable calcified infrarenal AAA, the chest portion of his CT abdomen did show a right lower lobe infiltrate and bilateral effusions and he has been given some for the possible pneumonia.  The sending physician will be starting him on BiPAP to treat his hypercapnia.  Bicarbonate will be considered if his acidosis does not improve.  Goals of care were discussed by the sending physician with his family who states that the patient wanted everything done including dialysis, CPR, intubation.  Pertinent imaging & lab results: As above  Code Status: Full per transferring provider, I personally verified with the transferring provider patient's code status and the transferring provider has confirmed this with the patient.  Further work up or recommendations prior to transfer: Initiate BiPAP, continue to trend potassium and treat acidosis with  noninvasive positive pressure ventilation +/- bicarbonate infusion  Consultants called prior to transfer and pertinent input from consultants: none. Nephrology notified of acceptance (Dr Montiel), has been seen by [facility] in the past  Patient accepted for transfer: yes  Consultants to be called upon arrival: intensivist  Floor requested: ICU  ADT order placed for accepted patient: yes    Please inform the Intensivist upon assignment of an ICU bed and then again on arrival of the patient.

## 2025-01-30 LAB
ALBUMIN SERPL BCP-MCNC: 2.6 G/DL (ref 3.2–4.9)
ALBUMIN/GLOB SERPL: 0.9 G/DL
ALP SERPL-CCNC: 122 U/L (ref 30–99)
ALT SERPL-CCNC: 14 U/L (ref 2–50)
ANION GAP SERPL CALC-SCNC: 10 MMOL/L (ref 7–16)
ANION GAP SERPL CALC-SCNC: 11 MMOL/L (ref 7–16)
ANION GAP SERPL CALC-SCNC: 7 MMOL/L (ref 7–16)
ANION GAP SERPL CALC-SCNC: 7 MMOL/L (ref 7–16)
APPEARANCE UR: CLEAR
ARTERIAL PATENCY WRIST A: ABNORMAL
ARTERIAL PATENCY WRIST A: ABNORMAL
AST SERPL-CCNC: 16 U/L (ref 12–45)
BACTERIA #/AREA URNS HPF: ABNORMAL /HPF
BACTERIA BLD CULT: NORMAL
BACTERIA BLD CULT: NORMAL
BASE EXCESS BLDA CALC-SCNC: -1 MMOL/L (ref -4–3)
BASE EXCESS BLDA CALC-SCNC: 1 MMOL/L (ref -4–3)
BASE EXCESS BLDA CALC-SCNC: 2 MMOL/L (ref -4–3)
BASE EXCESS BLDA CALC-SCNC: 3 MMOL/L (ref -4–3)
BASOPHILS # BLD AUTO: 0.1 % (ref 0–1.8)
BASOPHILS # BLD: 0.01 K/UL (ref 0–0.12)
BILIRUB SERPL-MCNC: 0.4 MG/DL (ref 0.1–1.5)
BILIRUB UR QL STRIP.AUTO: ABNORMAL
BODY TEMPERATURE: ABNORMAL DEGREES
BUN SERPL-MCNC: 31 MG/DL (ref 8–22)
BUN SERPL-MCNC: 55 MG/DL (ref 8–22)
BUN SERPL-MCNC: 59 MG/DL (ref 8–22)
BUN SERPL-MCNC: 61 MG/DL (ref 8–22)
CALCIUM ALBUM COR SERPL-MCNC: 9.5 MG/DL (ref 8.5–10.5)
CALCIUM SERPL-MCNC: 8.4 MG/DL (ref 8.5–10.5)
CALCIUM SERPL-MCNC: 8.5 MG/DL (ref 8.5–10.5)
CALCIUM SERPL-MCNC: 8.6 MG/DL (ref 8.5–10.5)
CALCIUM SERPL-MCNC: 8.8 MG/DL (ref 8.5–10.5)
CASTS URNS QL MICRO: ABNORMAL /LPF (ref 0–2)
CHLORIDE SERPL-SCNC: 100 MMOL/L (ref 96–112)
CHLORIDE SERPL-SCNC: 102 MMOL/L (ref 96–112)
CHLORIDE SERPL-SCNC: 103 MMOL/L (ref 96–112)
CHLORIDE SERPL-SCNC: 104 MMOL/L (ref 96–112)
CO2 BLDA-SCNC: 29 MMOL/L (ref 32–48)
CO2 BLDA-SCNC: 29 MMOL/L (ref 32–48)
CO2 BLDA-SCNC: 31 MMOL/L (ref 32–48)
CO2 BLDA-SCNC: 32 MMOL/L (ref 32–48)
CO2 SERPL-SCNC: 24 MMOL/L (ref 20–33)
CO2 SERPL-SCNC: 26 MMOL/L (ref 20–33)
CO2 SERPL-SCNC: 26 MMOL/L (ref 20–33)
CO2 SERPL-SCNC: 27 MMOL/L (ref 20–33)
COLOR UR: YELLOW
CREAT SERPL-MCNC: 2.89 MG/DL (ref 0.5–1.4)
CREAT SERPL-MCNC: 4.02 MG/DL (ref 0.5–1.4)
CREAT SERPL-MCNC: 4.35 MG/DL (ref 0.5–1.4)
CREAT SERPL-MCNC: 4.41 MG/DL (ref 0.5–1.4)
CREAT UR-MCNC: 45.3 MG/DL
CREAT UR-MCNC: 45.6 MG/DL
DELSYS IDSYS: ABNORMAL
EOSINOPHIL # BLD AUTO: 0.23 K/UL (ref 0–0.51)
EOSINOPHIL NFR BLD: 3.2 % (ref 0–6.9)
EPITHELIAL CELLS 1715: ABNORMAL /HPF (ref 0–5)
ERYTHROCYTE [DISTWIDTH] IN BLOOD BY AUTOMATED COUNT: 65.4 FL (ref 35.9–50)
GFR SERPLBLD CREATININE-BSD FMLA CKD-EPI: 14 ML/MIN/1.73 M 2
GFR SERPLBLD CREATININE-BSD FMLA CKD-EPI: 14 ML/MIN/1.73 M 2
GFR SERPLBLD CREATININE-BSD FMLA CKD-EPI: 15 ML/MIN/1.73 M 2
GFR SERPLBLD CREATININE-BSD FMLA CKD-EPI: 23 ML/MIN/1.73 M 2
GLOBULIN SER CALC-MCNC: 2.9 G/DL (ref 1.9–3.5)
GLUCOSE BLD STRIP.AUTO-MCNC: 85 MG/DL (ref 65–99)
GLUCOSE SERPL-MCNC: 102 MG/DL (ref 65–99)
GLUCOSE SERPL-MCNC: 81 MG/DL (ref 65–99)
GLUCOSE SERPL-MCNC: 83 MG/DL (ref 65–99)
GLUCOSE SERPL-MCNC: 85 MG/DL (ref 65–99)
GLUCOSE UR STRIP.AUTO-MCNC: NEGATIVE MG/DL
HAV IGM SERPL QL IA: NORMAL
HBV CORE IGM SER QL: NORMAL
HBV SURFACE AG SER QL: NORMAL
HCO3 BLDA-SCNC: 27.2 MMOL/L (ref 21–28)
HCO3 BLDA-SCNC: 27.3 MMOL/L (ref 21–28)
HCO3 BLDA-SCNC: 29.3 MMOL/L (ref 21–28)
HCO3 BLDA-SCNC: 30.3 MMOL/L (ref 21–28)
HCT VFR BLD AUTO: 31.4 % (ref 42–52)
HCV AB SER QL: NORMAL
HGB BLD-MCNC: 9.5 G/DL (ref 14–18)
HOROWITZ INDEX BLDA+IHG-RTO: 170 MM[HG]
HOROWITZ INDEX BLDA+IHG-RTO: 183 MM[HG]
IMM GRANULOCYTES # BLD AUTO: 0.29 K/UL (ref 0–0.11)
IMM GRANULOCYTES NFR BLD AUTO: 4 % (ref 0–0.9)
KETONES UR STRIP.AUTO-MCNC: NEGATIVE MG/DL
LACTATE BLD-SCNC: <0.3 MMOL/L (ref 0.5–2)
LACTATE BLD-SCNC: <0.3 MMOL/L (ref 0.5–2)
LEUKOCYTE ESTERASE UR QL STRIP.AUTO: ABNORMAL
LPM ILPM: 6 LPM
LYMPHOCYTES # BLD AUTO: 1 K/UL (ref 1–4.8)
LYMPHOCYTES NFR BLD: 13.7 % (ref 22–41)
MCH RBC QN AUTO: 29.9 PG (ref 27–33)
MCHC RBC AUTO-ENTMCNC: 30.3 G/DL (ref 32.3–36.5)
MCV RBC AUTO: 98.7 FL (ref 81.4–97.8)
MICRO URNS: ABNORMAL
MICROALBUMIN UR-MCNC: 2 MG/DL
MICROALBUMIN/CREAT UR: 44 MG/G (ref 0–30)
MONOCYTES # BLD AUTO: 1 K/UL (ref 0–0.85)
MONOCYTES NFR BLD AUTO: 13.7 % (ref 0–13.4)
NEUTROPHILS # BLD AUTO: 4.76 K/UL (ref 1.82–7.42)
NEUTROPHILS NFR BLD: 65.3 % (ref 44–72)
NITRITE UR QL STRIP.AUTO: NEGATIVE
NRBC # BLD AUTO: 0 K/UL
NRBC BLD-RTO: 0 /100 WBC (ref 0–0.2)
O2/TOTAL GAS SETTING VFR VENT: 40 %
O2/TOTAL GAS SETTING VFR VENT: 40 %
PCO2 BLDA: 49.1 MMHG (ref 32–48)
PCO2 BLDA: 52.9 MMHG (ref 32–48)
PCO2 BLDA: 61.1 MMHG (ref 32–48)
PCO2 BLDA: 63 MMHG (ref 32–48)
PCO2 TEMP ADJ BLDA: 49.3 MMHG (ref 32–48)
PCO2 TEMP ADJ BLDA: 54 MMHG (ref 32–48)
PCO2 TEMP ADJ BLDA: 56 MMHG (ref 32–48)
PCO2 TEMP ADJ BLDA: 61.1 MMHG (ref 32–48)
PH BLDA: 7.26 [PH] (ref 7.35–7.45)
PH BLDA: 7.29 [PH] (ref 7.35–7.45)
PH BLDA: 7.35 [PH] (ref 7.35–7.45)
PH BLDA: 7.35 [PH] (ref 7.35–7.45)
PH TEMP ADJ BLDA: 7.29 [PH] (ref 7.35–7.45)
PH TEMP ADJ BLDA: 7.3 [PH] (ref 7.35–7.45)
PH TEMP ADJ BLDA: 7.34 [PH] (ref 7.35–7.45)
PH TEMP ADJ BLDA: 7.35 [PH] (ref 7.35–7.45)
PH UR STRIP.AUTO: 5 [PH] (ref 5–8)
PLATELET # BLD AUTO: 170 K/UL (ref 164–446)
PMV BLD AUTO: 11 FL (ref 9–12.9)
PO2 BLDA: 108 MMHG (ref 83–108)
PO2 BLDA: 54 MMHG (ref 83–108)
PO2 BLDA: 68 MMHG (ref 83–108)
PO2 BLDA: 73 MMHG (ref 83–108)
PO2 TEMP ADJ BLDA: 104 MMHG (ref 83–108)
PO2 TEMP ADJ BLDA: 56 MMHG (ref 83–108)
PO2 TEMP ADJ BLDA: 64 MMHG (ref 83–108)
PO2 TEMP ADJ BLDA: 68 MMHG (ref 83–108)
POTASSIUM SERPL-SCNC: 4.9 MMOL/L (ref 3.6–5.5)
POTASSIUM SERPL-SCNC: 5.8 MMOL/L (ref 3.6–5.5)
POTASSIUM SERPL-SCNC: 5.8 MMOL/L (ref 3.6–5.5)
POTASSIUM SERPL-SCNC: 6.5 MMOL/L (ref 3.6–5.5)
PROT SERPL-MCNC: 5.5 G/DL (ref 6–8.2)
PROT UR QL STRIP: NEGATIVE MG/DL
PROT UR-MCNC: 20.8 MG/DL (ref 0–15)
PROT/CREAT UR: 456 MG/G (ref 15–68)
RBC # BLD AUTO: 3.18 M/UL (ref 4.7–6.1)
RBC # URNS HPF: ABNORMAL /HPF (ref 0–2)
RBC UR QL AUTO: ABNORMAL
SAO2 % BLDA: 85 % (ref 93–99)
SAO2 % BLDA: 91 % (ref 93–99)
SAO2 % BLDA: 92 % (ref 93–99)
SAO2 % BLDA: 97 % (ref 93–99)
SIGNIFICANT IND 70042: NORMAL
SIGNIFICANT IND 70042: NORMAL
SITE SITE: NORMAL
SITE SITE: NORMAL
SODIUM SERPL-SCNC: 136 MMOL/L (ref 135–145)
SODIUM SERPL-SCNC: 136 MMOL/L (ref 135–145)
SODIUM SERPL-SCNC: 137 MMOL/L (ref 135–145)
SODIUM SERPL-SCNC: 138 MMOL/L (ref 135–145)
SOURCE SOURCE: NORMAL
SOURCE SOURCE: NORMAL
SP GR UR STRIP.AUTO: 1.02
SPECIMEN DRAWN FROM PATIENT: ABNORMAL
UROBILINOGEN UR STRIP.AUTO-MCNC: 0.2 EU/DL
WBC # BLD AUTO: 7.3 K/UL (ref 4.8–10.8)
WBC #/AREA URNS HPF: ABNORMAL /HPF

## 2025-01-30 PROCEDURE — 83605 ASSAY OF LACTIC ACID: CPT

## 2025-01-30 PROCEDURE — 306565 RIGID MIT RESTRAINT(PAIR): Performed by: INTERNAL MEDICINE

## 2025-01-30 PROCEDURE — 700105 HCHG RX REV CODE 258

## 2025-01-30 PROCEDURE — A9270 NON-COVERED ITEM OR SERVICE: HCPCS | Performed by: INTERNAL MEDICINE

## 2025-01-30 PROCEDURE — 94640 AIRWAY INHALATION TREATMENT: CPT

## 2025-01-30 PROCEDURE — 700111 HCHG RX REV CODE 636 W/ 250 OVERRIDE (IP): Mod: JZ

## 2025-01-30 PROCEDURE — 82043 UR ALBUMIN QUANTITATIVE: CPT

## 2025-01-30 PROCEDURE — 700111 HCHG RX REV CODE 636 W/ 250 OVERRIDE (IP): Performed by: INTERNAL MEDICINE

## 2025-01-30 PROCEDURE — 700111 HCHG RX REV CODE 636 W/ 250 OVERRIDE (IP): Performed by: HOSPITALIST

## 2025-01-30 PROCEDURE — 82803 BLOOD GASES ANY COMBINATION: CPT | Mod: 91

## 2025-01-30 PROCEDURE — 94660 CPAP INITIATION&MGMT: CPT

## 2025-01-30 PROCEDURE — 770000 HCHG ROOM/CARE - INTERMEDIATE ICU *

## 2025-01-30 PROCEDURE — 99223 1ST HOSP IP/OBS HIGH 75: CPT | Mod: AI | Performed by: INTERNAL MEDICINE

## 2025-01-30 PROCEDURE — 80053 COMPREHEN METABOLIC PANEL: CPT

## 2025-01-30 PROCEDURE — 92610 EVALUATE SWALLOWING FUNCTION: CPT

## 2025-01-30 PROCEDURE — 80048 BASIC METABOLIC PNL TOTAL CA: CPT

## 2025-01-30 PROCEDURE — 700101 HCHG RX REV CODE 250: Performed by: HOSPITALIST

## 2025-01-30 PROCEDURE — 82962 GLUCOSE BLOOD TEST: CPT

## 2025-01-30 PROCEDURE — 99233 SBSQ HOSP IP/OBS HIGH 50: CPT | Performed by: INTERNAL MEDICINE

## 2025-01-30 PROCEDURE — 82570 ASSAY OF URINE CREATININE: CPT | Mod: 91

## 2025-01-30 PROCEDURE — 81001 URINALYSIS AUTO W/SCOPE: CPT

## 2025-01-30 PROCEDURE — 36600 WITHDRAWAL OF ARTERIAL BLOOD: CPT

## 2025-01-30 PROCEDURE — 84156 ASSAY OF PROTEIN URINE: CPT

## 2025-01-30 PROCEDURE — 85025 COMPLETE CBC W/AUTO DIFF WBC: CPT

## 2025-01-30 PROCEDURE — 700111 HCHG RX REV CODE 636 W/ 250 OVERRIDE (IP): Mod: JZ | Performed by: INTERNAL MEDICINE

## 2025-01-30 PROCEDURE — 700105 HCHG RX REV CODE 258: Performed by: HOSPITALIST

## 2025-01-30 PROCEDURE — 700111 HCHG RX REV CODE 636 W/ 250 OVERRIDE (IP)

## 2025-01-30 PROCEDURE — 700101 HCHG RX REV CODE 250: Performed by: EMERGENCY MEDICINE

## 2025-01-30 PROCEDURE — 90935 HEMODIALYSIS ONE EVALUATION: CPT

## 2025-01-30 PROCEDURE — 700102 HCHG RX REV CODE 250 W/ 637 OVERRIDE(OP): Performed by: INTERNAL MEDICINE

## 2025-01-30 PROCEDURE — 700105 HCHG RX REV CODE 258: Performed by: INTERNAL MEDICINE

## 2025-01-30 RX ORDER — ATORVASTATIN CALCIUM 40 MG/1
40 TABLET, FILM COATED ORAL EVERY EVENING
Status: DISCONTINUED | OUTPATIENT
Start: 2025-01-30 | End: 2025-02-12 | Stop reason: HOSPADM

## 2025-01-30 RX ORDER — DEXMEDETOMIDINE HYDROCHLORIDE 4 UG/ML
.1-1.5 INJECTION, SOLUTION INTRAVENOUS CONTINUOUS
Status: DISCONTINUED | OUTPATIENT
Start: 2025-01-30 | End: 2025-02-01

## 2025-01-30 RX ORDER — LORAZEPAM 2 MG/ML
0.5 INJECTION INTRAMUSCULAR ONCE
Status: COMPLETED | OUTPATIENT
Start: 2025-01-30 | End: 2025-01-30

## 2025-01-30 RX ORDER — ALLOPURINOL 100 MG/1
100 TABLET ORAL DAILY
COMMUNITY

## 2025-01-30 RX ORDER — CYANOCOBALAMIN 1000 UG/ML
1000 INJECTION, SOLUTION INTRAMUSCULAR; SUBCUTANEOUS
Status: ON HOLD | COMMUNITY
End: 2025-01-30

## 2025-01-30 RX ORDER — CYANOCOBALAMIN (VITAMIN B-12) 1000 MCG
1000 TABLET ORAL DAILY
COMMUNITY

## 2025-01-30 RX ORDER — EMPAGLIFLOZIN 10 MG/1
10 TABLET, FILM COATED ORAL DAILY
COMMUNITY

## 2025-01-30 RX ORDER — UMECLIDINIUM BROMIDE AND VILANTEROL TRIFENATATE 62.5; 25 UG/1; UG/1
1 POWDER RESPIRATORY (INHALATION) DAILY
COMMUNITY
Start: 2025-01-21

## 2025-01-30 RX ORDER — ISOSORBIDE MONONITRATE 120 MG/1
240 TABLET, EXTENDED RELEASE ORAL EVERY MORNING
COMMUNITY

## 2025-01-30 RX ORDER — PRAMIPEXOLE DIHYDROCHLORIDE 0.5 MG/1
0.5 TABLET ORAL DAILY
COMMUNITY

## 2025-01-30 RX ORDER — AMLODIPINE BESYLATE 10 MG/1
10 TABLET ORAL DAILY
COMMUNITY
Start: 2024-12-17

## 2025-01-30 RX ORDER — VITS A,C,E/LUTEIN/MINERALS 300MCG-200
200 TABLET ORAL DAILY
COMMUNITY

## 2025-01-30 RX ORDER — IPRATROPIUM BROMIDE AND ALBUTEROL SULFATE 2.5; .5 MG/3ML; MG/3ML
3 SOLUTION RESPIRATORY (INHALATION)
Status: DISCONTINUED | OUTPATIENT
Start: 2025-01-30 | End: 2025-01-31

## 2025-01-30 RX ORDER — CLOPIDOGREL BISULFATE 75 MG/1
75 TABLET ORAL DAILY
COMMUNITY

## 2025-01-30 RX ORDER — GABAPENTIN 300 MG/1
300 CAPSULE ORAL DAILY
COMMUNITY

## 2025-01-30 RX ORDER — ATORVASTATIN CALCIUM 40 MG/1
1 TABLET, FILM COATED ORAL DAILY
COMMUNITY

## 2025-01-30 RX ORDER — ALBUTEROL SULFATE 5 MG/ML
2.5 SOLUTION RESPIRATORY (INHALATION)
Status: DISCONTINUED | OUTPATIENT
Start: 2025-01-30 | End: 2025-02-08

## 2025-01-30 RX ORDER — HEPARIN SODIUM 1000 [USP'U]/ML
INJECTION, SOLUTION INTRAVENOUS; SUBCUTANEOUS
Status: COMPLETED
Start: 2025-01-30 | End: 2025-01-30

## 2025-01-30 RX ORDER — AZITHROMYCIN 250 MG/1
250 TABLET, FILM COATED ORAL DAILY
Status: ON HOLD | COMMUNITY
Start: 2025-01-05 | End: 2025-02-11

## 2025-01-30 RX ORDER — LEVOTHYROXINE SODIUM 150 UG/1
150 TABLET ORAL
COMMUNITY

## 2025-01-30 RX ADMIN — IPRATROPIUM BROMIDE AND ALBUTEROL SULFATE 3 ML: .5; 2.5 SOLUTION RESPIRATORY (INHALATION) at 19:15

## 2025-01-30 RX ADMIN — HEPARIN SODIUM 1200 UNITS: 1000 INJECTION, SOLUTION INTRAVENOUS; SUBCUTANEOUS at 01:27

## 2025-01-30 RX ADMIN — IPRATROPIUM BROMIDE AND ALBUTEROL SULFATE 3 ML: .5; 2.5 SOLUTION RESPIRATORY (INHALATION) at 14:16

## 2025-01-30 RX ADMIN — ATORVASTATIN CALCIUM 40 MG: 40 TABLET, FILM COATED ORAL at 19:39

## 2025-01-30 RX ADMIN — AMPICILLIN AND SULBACTAM 3 G: 1; 2 INJECTION, POWDER, FOR SOLUTION INTRAMUSCULAR; INTRAVENOUS at 02:15

## 2025-01-30 RX ADMIN — HEPARIN SODIUM 5000 UNITS: 5000 INJECTION, SOLUTION INTRAVENOUS; SUBCUTANEOUS at 22:40

## 2025-01-30 RX ADMIN — HEPARIN SODIUM 1200 UNITS: 1000 INJECTION, SOLUTION INTRAVENOUS; SUBCUTANEOUS at 01:28

## 2025-01-30 RX ADMIN — DEXMEDETOMIDINE HYDROCHLORIDE 0.2 MCG/KG/HR: 100 INJECTION, SOLUTION INTRAVENOUS at 23:24

## 2025-01-30 RX ADMIN — HEPARIN SODIUM 1200 UNITS: 1000 INJECTION, SOLUTION INTRAVENOUS; SUBCUTANEOUS at 18:51

## 2025-01-30 RX ADMIN — IPRATROPIUM BROMIDE AND ALBUTEROL SULFATE 3 ML: .5; 2.5 SOLUTION RESPIRATORY (INHALATION) at 10:42

## 2025-01-30 RX ADMIN — IPRATROPIUM BROMIDE AND ALBUTEROL SULFATE 3 ML: .5; 2.5 SOLUTION RESPIRATORY (INHALATION) at 06:12

## 2025-01-30 RX ADMIN — SENNOSIDES AND DOCUSATE SODIUM 2 TABLET: 50; 8.6 TABLET ORAL at 19:39

## 2025-01-30 RX ADMIN — IPRATROPIUM BROMIDE AND ALBUTEROL SULFATE 3 ML: .5; 2.5 SOLUTION RESPIRATORY (INHALATION) at 02:23

## 2025-01-30 RX ADMIN — HEPARIN SODIUM 5000 UNITS: 5000 INJECTION, SOLUTION INTRAVENOUS; SUBCUTANEOUS at 14:02

## 2025-01-30 RX ADMIN — LORAZEPAM 0.5 MG: 2 INJECTION, SOLUTION INTRAMUSCULAR; INTRAVENOUS at 20:41

## 2025-01-30 RX ADMIN — HEPARIN SODIUM 5000 UNITS: 5000 INJECTION, SOLUTION INTRAVENOUS; SUBCUTANEOUS at 06:24

## 2025-01-30 RX ADMIN — AMPICILLIN AND SULBACTAM 3 G: 1; 2 INJECTION, POWDER, FOR SOLUTION INTRAMUSCULAR; INTRAVENOUS at 18:26

## 2025-01-30 RX ADMIN — IPRATROPIUM BROMIDE AND ALBUTEROL SULFATE 3 ML: .5; 2.5 SOLUTION RESPIRATORY (INHALATION) at 22:54

## 2025-01-30 ASSESSMENT — PAIN DESCRIPTION - PAIN TYPE
TYPE: ACUTE PAIN
TYPE: CHRONIC PAIN
TYPE: ACUTE PAIN

## 2025-01-30 ASSESSMENT — PULMONARY FUNCTION TESTS
EPAP_CMH2O: 5
EPAP_CMH2O: 5

## 2025-01-30 NOTE — H&P
Critical Care History & Physical Note    Date of Service  1/29/2025    Chief Complaint  Altered mental status    History of Presenting Illness  History was collected from daughter who is NOK and transferring documentation as patient has altered mental status.    Pb Peters is a 69 y.o. male with history of CAD s/p PCI in 2014, HFpEF, COPD on 6 L at home, stage IV CKD, and infrarenal AAA, blindness, who lives in a skilled nursing facility who presented to [facility] with confusion, weakness, slurring of speech, shortness of breath and not able to follow commands which started the morning of admission day. This was also associated with incontinence that started also on day of admission. Patient at baseline is ambulatory, and 'mildly altered.'  At [facility], patient was found to have acute on chronic renal failure with a creatinine of 6.57 and a potassium of 7.8.  Blood gas was notable for pH of 7.1, pCO2 of 74.  CT head was unremarkable.  CT of his abdomen demonstrated atrophic kidneys with right lower lobe infiltrate and bilateral pleural effusions. UA was not suggestive of UTI. Patient was given a dose of ceftriaxone, calcium gluconate, D50 with insulin, was started on BiPAP and was transferred to [facility] by [facility] for further management.      I discussed the plan of care with family.    Review of Systems  Review of Systems   Reason unable to perform ROS: critical illness.       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   History of stent insertion in 2014.    Family History  Family history reviewed with patient. There is no family history that is pertinent to the chief complaint.     Social History   reports that he  has quit smoking. He has never used smokeless tobacco.  No reports of current tobacco use.     Allergies  Allergies   Allergen Reactions    Lisinopril      Per pt damages kidneys       Medications     Albuterol  Amlodipine 10mg OD  Aatrovastatin 40mg OD  Varvedilol 37.5    Physical Exam  Temp:  [35.8 °C (96.5 °F)] 35.8 °C (96.5 °F)  Pulse:  [51-61] 54  Resp:  [14-30] 18  BP: ()/(48-86) 116/56  SpO2:  [96 %-99 %] 96 %      Physical Exam  Constitutional:       General: He is in acute distress.      Appearance: He is ill-appearing.   HENT:      Head: Normocephalic and atraumatic.      Nose: Nose normal.      Mouth/Throat:      Mouth: Mucous membranes are moist.      Pharynx: Oropharynx is clear.   Eyes:      General: No scleral icterus.     Extraocular Movements: Extraocular movements intact.      Conjunctiva/sclera: Conjunctivae normal.      Pupils: Pupils are equal, round, and reactive to light.   Cardiovascular:      Rate and Rhythm: Normal rate and regular rhythm.      Pulses: Normal pulses.      Heart sounds: Normal heart sounds.   Pulmonary:      Effort: Respiratory distress present.      Breath sounds: Rhonchi present.   Abdominal:      General: Bowel sounds are normal. There is distension.      Tenderness: There is no abdominal tenderness. There is no guarding.   Musculoskeletal:         General: Swelling present.      Right lower leg: Edema present.      Left lower leg: Edema present.   Skin:     General: Skin is warm.      Capillary Refill: Capillary refill takes 2 to 3 seconds.      Coloration: Skin is not jaundiced.   Neurological:      Mental Status: He is disoriented.      Comments: Aox3,   opens eyes to command           Laboratory:  Recent Labs     01/29/25 2126   WBC 8.8   RBC 3.33*   HEMOGLOBIN 10.1*   HEMATOCRIT 33.5*   .6*   MCH 30.3   MCHC 30.1*   RDW 66.5*   PLATELETCT 180   MPV 11.1     Recent Labs     01/29/25 2009   SODIUM 137   POTASSIUM 7.9*   CHLORIDE 100   CO2 25   GLUCOSE  "113*   BUN 88*   CREATININE 6.19*   CALCIUM 8.5     Recent Labs     01/29/25 2009   GLUCOSE 113*         No results for input(s): \"NTPROBNP\" in the last 72 hours.      No results for input(s): \"TROPONINT\" in the last 72 hours.    Imaging:  DX-CHEST-PORTABLE (1 VIEW)   Final Result      Right IJ catheter terminates in the region of the SVC. No pneumothorax.      Bibasilar opacities.          Assessment/Plan:  Justification for Admission Status  I anticipate this patient will require at least two midnights for appropriate medical management, necessitating inpatient admission because hyperkalemia, acute on chronic hypoxic respiratory failure    Patient will need a ICU (Adult and Pediatrics) bed on MEDICAL service .  The need is secondary to hyperkalemia with EKG changes.    * Hyperkalemia- (present on admission)  Assessment & Plan  Hyperkalemia of 7.1 from transferring facility with EKG changes peaked T waves. patient started on HD on admission.   -appreciate nephrology    Toxic metabolic encephalopathy  Assessment & Plan  Patient with AMS from baseline in setting of THERESA on ESRD, acute on chronic hypoxic respiratory failure  Patient at baseline able to follow commands, ambulate with assisstance.  Patient unable to follow complex commands on admission.       (HFpEF) heart failure with preserved ejection fraction (HCC)  Assessment & Plan  Last echo with normal LV function, indeterminate diastolic dysfunction, patient has been on furosemide, carvedilol  as outpatient. Patient's daughter reports that he has history of heart failure with history of bilateral lower extremity edema.   Patient is now with acute on chronic renal failure with hyperkalemia, plan for dialysis.     Chronic hypoxemic respiratory failure (HCC)  Assessment & Plan  COPD on 6L home oxygen.  CT chest with R basilar infiltrate  Supplemental oxygen for saturation 88-92%    Acute on chronic renal failure (HCC)  Assessment & Plan  THERESA on CKD4, with " hyperkalemia. CT with atrophic kidneys, no obstruction.  Anticipate he will now need long term dialysis. Discussed with family members that patient will likely be dependent on long term dialysis and daughter who is NOK in agreement.   -Dr. Montiel consulted prior to transfer, Dr. Eckert on call   -Inserted dialysis catheter.  -Daily CMP     Hypertension, essential- (present on admission)  Assessment & Plan  Holding GDMT and antihypertensives given sepsis    Abdominal aortic aneurysm (AAA) without rupture (HCC)- (present on admission)  Assessment & Plan  Outpatient follow-up, ~5.3cm.  It was 5.5cm in 12/2024 and 3.3cm in 2016.        VTE prophylaxis: heparin ppx

## 2025-01-30 NOTE — PROGRESS NOTES
Unable to complete med rec at this time. Ivis Sy [facility] [phone]  in [place] is faxing MAR.

## 2025-01-30 NOTE — CONSULTS
[facility] -  CONSULTATION NOTE               Author: Booker Eckert M.D. Date & Time: 1/29/2025  8:56 PM       REASON FOR CONSULTATION:   THERESA    CHIEF COMPLAINT:   Unable to assess    HISTORY OF PRESENT ILLNESS:    This is a 69-year-old male with past medical history of HFpEF, COPD on 6L NC at baseline, CKD stage IV with last Scr in the office of 2.3, infrarenal AAA, blindness who lives in a skilled nursing facility in [place].  Over the last several days has been more confused with generalized weakness.  He was sent to the ER at [facility] in [place] where he was found to be tachypneic with a normal heart rate and blood pressure.  Labs showed acute on chronic kidney disease with a creatinine of 6.57 and a potassium of 7.8.  He was given hyperkalemia protocol of IV calcium, albuterol and insulin.  He became mildly hypotensive therefore was given 2 L of NS and a potassium was repeated which was 7.6.  Calcium and albuterol were repeated.  Last VBG showed a pH of 7.1, pCO2 74, PaO2 80, bicarb 27, base excess -2.  A CT of the head was performed for his altered mental status which showed no acute abnormalities, CT of his abdomen showed no obstructing stone however atrophied kidneys and stable calcified infrarenal AAA, the chest portion of his CT abdomen did show a right lower lobe infiltrate and bilateral effusions and he has been given some for the possible pneumonia.  He was started on BiPAP to treat his hypercapnia.  He was also given IV Bicarbonate for his acidosis. Goals of care were discussed by the sending physician with his family who states that the patient wanted everything done including dialysis, CPR, intubation.He was flighted to [place] for advanced care. Nephrology was consulted for THERESA/Hyperkalemia from [place] and notified by [facility] on arrival. He is currently on BiPAP in the ICU. He is currently hemodynamically stable but his BP is soft with systolics in the low  90s.  Scr is 6.19 here and Bicarb is 25. Rest of BMP is pending. Patient is not able to provide history. This information is obtained from the medical record.    REVIEW OF SYSTEMS:    Unable to assess given patient's clinical status     PAST MEDICAL HISTORY:   HTN  Borderline DM  CAD  CHF  Hypothyroidism  Ischemic CM  CVA. CKD stage III/IV  AAA <4 cm    PAST SURGICAL HISTORY:   CAD/Stent    FAMILY HISTORY:   No significant Fhx of kidney disease    SOCIAL HISTORY:   Social History     Tobacco Use   Smoking Status Former    Types: Cigarettes   Smokeless Tobacco Never     Social History     Substance and Sexual Activity   Alcohol Use None     Social History     Substance and Sexual Activity   Drug Use Not on file       HOME MEDICATIONS:   Reviewed and documented in chart    LABORATORY STUDIES:         ALLERGIES:  Lisinopril    VS:  /59   Pulse 61   Temp 35.8 °C (96.5 °F) (Temporal)   Resp (!) 30   Ht 1.829 m (6')   Wt 99.3 kg (218 lb 14.7 oz)   SpO2 96%   BMI 29.69 kg/m²     Physical Exam  Vitals and nursing note reviewed.   Constitutional:       Appearance: He is ill-appearing.   HENT:      Head: Normocephalic and atraumatic.      Mouth/Throat:      Comments: Unable to assess   Eyes:      General:         Right eye: No discharge.         Left eye: No discharge.      Conjunctiva/sclera: Conjunctivae normal.   Cardiovascular:      Rate and Rhythm: Regular rhythm.      Pulses: Normal pulses.      Heart sounds: Normal heart sounds.   Pulmonary:      Effort: Pulmonary effort is normal. No respiratory distress.      Breath sounds: Rhonchi present.   Abdominal:      General: Abdomen is flat. Bowel sounds are normal.      Palpations: Abdomen is soft.   Musculoskeletal:      Cervical back: Neck supple.      Right lower leg: Edema present.      Left lower leg: Edema present.   Skin:     General: Skin is warm.      Capillary Refill: Capillary refill takes less than 2 seconds.   Neurological:      Comments: Unable to  assess   Psychiatric:      Comments: Unable to assess         FLUID BALANCE:  No intake/output data recorded.    IMAGING:  All imaging reviewed from admission to present day    IMPRESSION:  # THERESA - etiology is unclear but suspect possible Sepsis/ATN given imaging results(PNA)/Hypoperfusion.  # Acidosis  # Hyperkalemia ([facility] lab currently pending)  # Hypercapnea  # Altered mentation  # CKD Stage III/IV sec to  HTN and Ischemic Nephropathy  # H/o volume overload  # h/o CAD  # h/o CHF  # AAA <4 cm  # HLD  # Hypothyroidism    PLAN:  - He will require HD tonight. Critical care at bedside to place temp dialysis catheter. Will run him for 2.5 hrs on 2K+ bath/Will hold off on fluid pull tonight. Will reassess him in the am on whether he needs HD again.    - Daily evaluation for RRT needs  - Dose all meds per eGFR < 15  - Avoid Nephrotoxins  - Keep MAP >/= to 65  - Monitor I/Os  - Empiric Abx per ICU team    Thank you and will follow              Total CCM >/= to 65 mins    Thank you for the consultation!

## 2025-01-30 NOTE — THERAPY
"Speech Language Pathology   Clinical Swallow Evaluation     Patient Name: Pb Peters  AGE:  69 y.o., SEX:  male  Medical Record #: [id number]  Date of Service: 1/30/2025      History of Present Illness    68 y/o admitted on 1/29 for AMS. Not seen by SLP at [facility].    Dx chest 1/29:  Right IJ catheter terminates in the region of the SVC. No pneumothorax.  Bibasilar opacities.      PMH: CAD s/p PCI in 2014, HFpEF, COPD on 6 L at home, stage IV CKD, and infrarenal AAA, blindness      General Information:  Vitals  O2 (LPM): 6  O2 Delivery Device: Silicone Nasal Cannula  Level of Consciousness: Alert  Patient Behaviors: Confused  Orientation: Self  Follows Directives: Yes - simple commands only      Prior Living Situation & Level of Function:  Housing / Facility: Skilled Nursing Facility  Lives with - Patient's Self Care Capacity: Attendant / Paid Care Giver  Communication: unclear PLOF  Swallowing: pt endorsed hx of dysphagia but unable to elaborate       Oral Mechanism Evaluation:  Dentition: Full dentures, Edentulous   Facial Symmetry: Central left facial droop  Labial Observations: Open mouth posture, Left sided weakness   Lingual Observations: Midline               Laryngeal Function:  Secretion Management: Adequate  Voice Quality: WFL             Subjective  Pt awake upon entry. He was oriented x1 to self only and followed simple directives appropriately. He endorsed hx of dysphagia but was unable to elaborate deficits or current diet. Participation decreased as session progressed and ultimately pt stated \"will you just leave me alone?\"       Assessment  Current Method of Nutrition: NPO until cleared by speech pathology  Positioning: Ortiz's (60-90 degrees)  Bolus Administration: SLP  O2 (LPM): 6 O2 Delivery Device: Silicone Nasal Cannula  Factor(s) Affecting Performance: Other (see comments) (poor participation)             Swallowing Trials:  Swallowing Trials  Thin Liquid (TN0): WFL      Comments: Pt " "only accepted teaspoon sip x1 of thin liquids. No overt s/sx of aspiration noted with trial. Appropriate containment of bolus and suspect timely onset of swallow. Vocal quality was clear after the swallow. He refused further teaspoon sips and offerings of cup sips and straw sips of thin liquids, apple sauce, pudding, and crackers despite encouragement and education regarding NPO status. When asked if this clinician could return tomorrow he said \"no\". Per RN, pt with confusion at baseline so pt will benefit from ongoing education regarding role of SLP.      Clinical Impressions  Given limited participation, it is difficult to determine pt's current level of functioning. Pt is limited by confusion and participation on this date so SLP to follow on 1/31 for a re-evaluation of swallow function. Recommend continuation of NPO and defer medications to MD. Okay for small amount of ice chips to minimize xerostomia and maintain integrity of the swallow.       Recommendations  Diet Consistency: NPO with re-eval on 1/31  Instrumentation: Instrumental swallow study pending clinical progress  Medication: Other (see comments) (defer to MD)  Oral Care: Q4h         SLP Treatment Plan  Treatment Plan: Dysphagia Treatment  SLP Frequency: 3x Per Week  Estimated Duration: Until Therapy Goals Met      Anticipated Discharge Needs  Discharge Recommendations: Recommend post-acute placement for additional speech therapy services prior to discharge home   Therapy Recommendations Upon DC: Dysphagia Training, Community Re-Integration, Patient / Family / Caregiver Education        Patient / Family Goals  Patient / Family Goal #1: \"Just leave me alone\"  Short Term Goals  Short Term Goal # 1: Pt will paticipate in further evaluation of the oropharyngeal swallow      ESE Rodríguez   "

## 2025-01-30 NOTE — CARE PLAN
The patient is Watcher - Medium risk of patient condition declining or worsening    Shift Goals  Clinical Goals: Dialysis, Bipap  Patient Goals: DANICA  Family Goals: DANICA    Problem: Hemodynamics  Goal: Patient's hemodynamics, fluid balance and neurologic status will be stable or improve  Description: Target End Date:  Prior to discharge or change in level of care    Document on Assessment and I/O flowsheet templates    1.  Monitor vital signs, pulse oximetry and cardiac monitor per provider order and/or policy  2.  Maintain blood pressure per provider order  3.  Hemodynamic monitoring per provider order  4.  Manage IV fluids and IV infusions  5.  Monitor intake and output  6.  Daily weights per unit policy or provider order  7.  Assess peripheral pulses and capillary refill  8.  Assess color and body temperature  9.  Position patient for maximum circulation/cardiac output  10. Monitor for signs/symptoms of excessive bleeding  11. Assess mental status, restlessness and changes in level of consciousness  12. Monitor temperature and report fever or hypothermia to provider immediately. Consideration of targeted temperature management.  Outcome: Progressing     Problem: Respiratory  Goal: Patient will achieve/maintain optimum respiratory ventilation and gas exchange  Description: Target End Date:  Prior to discharge or change in level of care    Document on Assessment flowsheet    1.  Assess and monitor rate, rhythm, depth and effort of respiration  2.  Breath sounds assessed qshift and/or as needed  3.  Assess O2 saturation, administer/titrate oxygen as ordered  4.  Position patient for maximum ventilatory efficiency  5.  Turn, cough, and deep breath with splinting to improve effectiveness  6.  Collaborate with RT to administer medication/treatments per order  7.  Encourage use of incentive spirometer and encourage patient to cough after use and utilize splinting techniques if applicable  8.  Airway suctioning  9.   Monitor sputum production for changes in color, consistency and frequency  10. Perform frequent oral hygiene  11. Alternate physical activity with rest periods  Outcome: Progressing

## 2025-01-30 NOTE — CARE PLAN
Take medications as directed until completed  Motrin and or Tylenol as needed for pain  Follow up with PCP in 3-5 days  Proceed to  ER if symptoms worsen  Swimmer's Ear   AMBULATORY CARE:   Swimmer's ear , also called otitis externa, is an infection in the outer ear canal  This canal goes from the outside of your ear to your eardrum  Swimmer's ear most often occurs when water remains in your ear after you swim  This creates a moist area for bacteria to grow  Scratches or damage from your fingers, cotton swabs, or other objects can also cause swimmer's ear  Common signs and symptoms:   Ear pain    Red, swollen, itchy ear canal    Fluid or pus draining from your ear    A plugged ear and trouble hearing    Seek care immediately if:   You have severe ear pain  You are suddenly not able to hear  You have new swelling in your face, behind your ears, or in your neck  You suddenly cannot move part of your face, or it feels numb  Call your doctor if:   You have a fever  Your symptoms get worse or do not go away, even after treatment  You have questions or concerns about your condition or care  Treatment for swimmer's ear  may include cleaning your outer ear canal first  This will help clean any ear wax, flaky skin, or other discharge  You may then need any of the following:  Medicines:      Ear drops  help fight infection and decrease redness and swelling  Acetaminophen  decreases pain and fever  It is available without a doctor's order  Ask how much to take and how often to take it  Follow directions  Read the labels of all other medicines you are using to see if they also contain acetaminophen, or ask your doctor or pharmacist  Acetaminophen can cause liver damage if not taken correctly  NSAIDs , such as ibuprofen, help decrease swelling, pain, and fever  This medicine is available with or without a doctor's order  NSAIDs can cause stomach bleeding or kidney problems in certain people  The patient is Stable - Low risk of patient condition declining or worsening    Shift Goals  Clinical Goals: dialysis, pulmonary hygiene  Patient Goals: comfort  Family Goals: DANICA    Progress made toward(s) clinical / shift goals:    Problem: Knowledge Deficit - Standard  Goal: Patient and family/care givers will demonstrate understanding of plan of care, disease process/condition, diagnostic tests and medications  Outcome: Progressing     Problem: Skin Integrity  Goal: Skin integrity is maintained or improved  Outcome: Progressing     Problem: Fall Risk  Goal: Patient will remain free from falls  Outcome: Progressing     Problem: Hemodynamics  Goal: Patient's hemodynamics, fluid balance and neurologic status will be stable or improve  Outcome: Progressing     Problem: Respiratory  Goal: Patient will achieve/maintain optimum respiratory ventilation and gas exchange  Outcome: Progressing     Problem: Pain - Standard  Goal: Alleviation of pain or a reduction in pain to the patient’s comfort goal  Outcome: Progressing        If you take blood thinner medicine, always ask your healthcare provider if NSAIDs are safe for you  Always read the medicine label and follow directions  An ear wick  may be used if your ear canal is blocked  A wick (small tube) made of cotton or gauze is placed in your ear  The wick helps pull extra fluid out of your ear canal  Smitha also help draw medicine into your ear canal     How to use ear drops:   Warm the bottle of ear drops in your hands  for a few minutes  Lie down on your side with your infected ear facing up  This will help the medicine travel completely through your ear canal     Gently pull the ear up and back  Carefully drip the correct number of ear drops into your ear  Have another person help you if possible  For children younger than 3 years,  gently pull and hold the ear down and back  For children older than 3 years,  gently pull and hold the ear up and back  Stay in the same position  for 3 to 5 minutes to let the medicine soak in  Prevent swimmer's ear:   Dry your ears completely after you swim or bathe  Gently wipe your outer ear with a soft towel or cloth  Use ear plugs when you swim  Do not put cotton swabs or other objects in your ears  These can scratch or damage your ear  They can also push ear wax deeper in and irritate your ear  Put cotton balls gently in your outer ear  when you apply hair spray, hair dye, or perfume  Follow up with your doctor as directed:  Write down your questions so you remember to ask them during your visits  © Copyright [employer] 2022 Information is for End User's use only and may not be sold, redistributed or otherwise used for commercial purposes  The above information is an  only  It is not intended as medical advice for individual conditions or treatments  Talk to your doctor, nurse or pharmacist before following any medical regimen to see if it is safe and effective for you        Ear Infection AMBULATORY CARE:   An ear infection  is also called otitis media  Blocked or swollen eustachian tubes can cause an infection  Eustachian tubes connect the middle ear to the back of the nose and throat  They drain fluid from the middle ear  You may have a buildup of fluid in your ear  Germs build up in the fluid and infection develops  Common signs and symptoms:   Ear pain    Fever or a headache    Trouble hearing    Ringing or buzzing in your ear    Plugged ear or an ear that feels full    Dizziness    Nausea or vomiting    Seek care immediately if:   You have clear fluid coming from your ear  You have a stiff neck, headache, and a fever  Call your doctor if:   You see blood or pus draining from your ear  Your ear pain gets worse or does not go away, even after treatment  The outside of your ear is red or swollen  You are vomiting or have diarrhea  You have questions or concerns about your condition or care  Medicines: You may  need any of the following:  Acetaminophen  decreases pain and fever  It is available without a doctor's order  Ask how much to take and how often to take it  Follow directions  Read the labels of all other medicines you are using to see if they also contain acetaminophen, or ask your doctor or pharmacist  Acetaminophen can cause liver damage if not taken correctly  NSAIDs , such as ibuprofen, help decrease swelling, pain, and fever  This medicine is available with or without a doctor's order  NSAIDs can cause stomach bleeding or kidney problems in certain people  If you take blood thinner medicine, always ask your healthcare provider if NSAIDs are safe for you  Always read the medicine label and follow directions  Ear drops  may contain medicine to decrease pain and inflammation  Antibiotics  help treat a bacterial infection  Self-care:   Apply heat  on your ear for 15 to 20 minutes, 3 to 4 times a day or as directed   You can apply heat with an electric heating pad, hot water bottle, or warm compress  Always put a cloth between your skin and the heat pack to prevent burns  Heat helps decrease pain  Apply ice  on your ear for 15 to 20 minutes, 3 to 4 times a day for 2 days or as directed  Use an ice pack, or put crushed ice in a plastic bag  Cover it with a towel before you apply it to your ear  Ice decreases swelling and pain  Prevent an ear infection:   Wash your hands often  to help prevent the spread of germs  Ask everyone in your house to wash their hands with soap and water  Ask them to wash after they use the bathroom or change a diaper  Remind them to wash before they prepare or eat food  Stay away from people who are ill  Some germs spread easily and quickly through contact  Follow up with your doctor as directed:  Write down your questions so you remember to ask them during your visits  © Copyright [employer] 2022 Information is for End User's use only and may not be sold, redistributed or otherwise used for commercial purposes  The above information is an  only  It is not intended as medical advice for individual conditions or treatments  Talk to your doctor, nurse or pharmacist before following any medical regimen to see if it is safe and effective for you

## 2025-01-30 NOTE — ASSESSMENT & PLAN NOTE
COPD on 6L home oxygen.  CT chest with R basilar infiltrate  Supplemental oxygen for saturation 88-92%

## 2025-01-30 NOTE — CONSULTS
Hospital Medicine Consultation    Date of Service  1/30/2025    Referring Physician  Rico Rios D.O.    Consulting Physician  Rico Rios D.O.    Reason for Consultation  Overall medical management    History of Presenting Illness  69 y.o. male who presented 1/29/2025 with malaise.  Patient initially presented to [facility], found to have acute hypercapnic respiratory failure as well as acute kidney injury with hyperkalemia.  Patient was urgently transferred here, HD line was placed.  There was concern for a right lower lobe pneumonia and patient was placed on Unasyn.  Patient does have a history of COPD yet was not deemed to be in exacerbation.  Patient did however require BiPAP due to his respiratory failure.  He has ongoing encephalopathy.  No acute changes on CT head.  Patient remains altered, unable to get any information from him, information obtained from the chart.  Apparently, he resides in a skilled nursing facility, he does have chronic kidney disease, stage IV, last known creatinine was 2.3.    Review of Systems  Review of Systems   Unable to perform ROS: Acuity of condition       Past Medical History   has no past medical history on file.    Surgical History   has no past surgical history on file.    Family History  family history is not on file.    Social History   reports that he has quit smoking. He has never used smokeless tobacco.    Medications  Prior to Admission Medications   Prescriptions Last Dose Informant Patient Reported? Taking?   ANORO ELLIPTA 62.5-25 MCG/ACT AEROSOL POWDER, BREATH ACTIVATED inhaler 1/29/2025 MAR from Other Facility Yes Yes   Sig: Inhale 1 Puff every day.   Cyanocobalamin (B-12) 1000 MCG Tab 1/29/2025 Morning MAR from Other Facility Yes Yes   Sig: Take 1,000 mcg by mouth every day.   JARDIANCE 10 MG Tab tablet 1/29/2025 Morning MAR from Other Facility Yes Yes   Sig: Take 10 mg by mouth every day.   Magnesium Oxide -Mg Supplement (MAG-OXIDE) 200 MG Tab  1/29/2025 Morning MAR from Other Facility Yes Yes   Sig: Take 200 mg by mouth every day.   allopurinol (ZYLOPRIM) 100 MG Tab 1/29/2025 Morning MAR from Other Facility Yes Yes   Sig: Take 100 mg by mouth every day.   amLODIPine (NORVASC) 10 MG Tab 1/27/2025 Morning MAR from Other Facility Yes Yes   Sig: Take 10 mg by mouth every day. Hold if BP<100/50 or P,60   atorvastatin (LIPITOR) 40 MG Tab 1/28/2025 Bedtime MAR from Other Facility Yes Yes   Sig: Take 1 Tablet by mouth every day.   azithromycin (ZITHROMAX) 250 MG Tab 1/7/2025 Morning  Yes No   Sig: Take 250 mg by mouth every day. X 2 days= finished  Indications: Community Acquired Pneumonia   clopidogrel (PLAVIX) 75 MG Tab 1/29/2025 Morning MAR from Other Facility Yes Yes   Sig: Take 75 mg by mouth every day.   famotidine (PEPCID) 40 MG Tab 1/29/2025 Morning MAR from Other Facility Yes Yes   Sig: Take 40 mg by mouth every day.   ferrous sulfate 325 (65 Fe) MG tablet 1/29/2025 Morning MAR from Other Facility Yes Yes   Sig: Take 325 mg by mouth every day.   gabapentin (NEURONTIN) 300 MG Cap 1/29/2025 Morning MAR from Other Facility Yes Yes   Sig: Take 300 mg by mouth every day.   isosorbide mononitrate (IMDUR) 120 MG CR tablet 1/29/2025 Morning MAR from Other Facility Yes Yes   Sig: Take 240 mg by mouth every morning.   levothyroxine (SYNTHROID) 150 MCG Tab 1/29/2025 Morning MAR from Other Facility Yes Yes   Sig: Take 150 mcg by mouth every morning on an empty stomach.   pramipexole (MIRAPEX) 0.5 MG Tab 1/29/2025 Morning MAR from Other Facility Yes Yes   Sig: Take 0.5 mg by mouth every day.      Facility-Administered Medications: None       Allergies  Allergies   Allergen Reactions    Lisinopril      Per pt damages kidneys       Physical Exam  Temp:  [35.8 °C (96.5 °F)] 35.8 °C (96.5 °F)  Pulse:  [48-72] 70  Resp:  [14-39] 18  BP: ()/(46-86) 111/53  SpO2:  [91 %-99 %] 94 %    Physical Exam  Vitals and nursing note reviewed.   Constitutional:       General: He  is not in acute distress.     Appearance: He is well-developed. He is ill-appearing. He is not toxic-appearing or diaphoretic.   HENT:      Head: Normocephalic and atraumatic.      Right Ear: External ear normal.      Left Ear: External ear normal.      Nose: Nose normal. No congestion or rhinorrhea.      Mouth/Throat:      Mouth: Mucous membranes are moist.      Pharynx: No oropharyngeal exudate.   Eyes:      General:         Right eye: No discharge.         Left eye: No discharge.   Neck:      Trachea: No tracheal deviation.   Cardiovascular:      Rate and Rhythm: Normal rate and regular rhythm.      Heart sounds: No murmur heard.     No friction rub. No gallop.   Pulmonary:      Effort: Pulmonary effort is normal. No respiratory distress.      Breath sounds: No stridor. Rales present. No wheezing.   Chest:      Chest wall: No tenderness.   Abdominal:      General: Bowel sounds are normal. There is no distension.      Palpations: Abdomen is soft.      Tenderness: There is no abdominal tenderness.   Musculoskeletal:         General: No tenderness. Normal range of motion.      Cervical back: Normal range of motion and neck supple. No edema or erythema.      Right lower leg: Edema present.      Left lower leg: Edema present.   Lymphadenopathy:      Cervical: No cervical adenopathy.   Skin:     General: Skin is warm and dry.      Findings: No erythema or rash.   Neurological:      Mental Status: He is alert.      Comments: Awake, confused   Psychiatric:         Speech: Speech is delayed.         Behavior: Behavior is slowed.         Cognition and Memory: Cognition is impaired. Memory is impaired.         Fluids  Date 01/30/25 0700 - 01/31/25 0659   Shift 5642-1656 6800-1460 1984-1517 24 Hour Total   INTAKE   I.V. 3.4   3.4   Shift Total 3.4   3.4   OUTPUT   Urine 370   370   Shift Total 370   370   Weight (kg) 99.3 99.3 99.3 99.3       Laboratory  Recent Labs     01/29/25 2126 01/30/25  0427   WBC 8.8 7.3   RBC 3.33*  3.18*   HEMOGLOBIN 10.1* 9.5*   HEMATOCRIT 33.5* 31.4*   .6* 98.7*   MCH 30.3 29.9   MCHC 30.1* 30.3*   RDW 66.5* 65.4*   PLATELETCT 180 170   MPV 11.1 11.0     Recent Labs     01/30/25  0221 01/30/25  0427 01/30/25  1038   SODIUM 136 136 138   POTASSIUM 5.8* 5.8* 6.5*   CHLORIDE 102 103 104   CO2 24 26 27   GLUCOSE 102* 85 81   BUN 55* 59* 61*   CREATININE 4.02* 4.41* 4.35*   CALCIUM 8.5 8.4* 8.6                     Imaging  DX-CHEST-PORTABLE (1 VIEW)   Final Result      Right IJ catheter terminates in the region of the SVC. No pneumothorax.      Bibasilar opacities.          Assessment/Plan  * Hyperkalemia- (present on admission)  Assessment & Plan  Secondary to acute on chronic kidney disease  Persistent, will need ongoing hemodialysis  Closely monitor on telemetry  Patient is at high risk of worsening and will need close monitoring in the IMCU due to ongoing hyperkalemia  Plan is for hemodialysis shortly, no need for treatment of his hyperkalemia  Repeat BMP in the morning    Toxic metabolic encephalopathy  Assessment & Plan  Patient continues to be encephalopathic  Likely uremic due to acute on chronic kidney disease  Also possibly secondary to pneumonia  Continue Unasyn  Hemodialysis per nephrology  CT head showed nothing acute    Chronic hypoxemic respiratory failure (HCC)  Assessment & Plan  Patient does have a history of COPD and does require 6 L of oxygen at baseline   CT chest showed changes consistent with right lower lobe pneumonia   Patient has been started on Unasyn and azithromycin   Patient did have acute worsening and was requiring BiPAP however now with good sats on his chronic 6 L    Acute on chronic renal failure (HCC)  Assessment & Plan  Acute kidney injury on top on chronic kidney disease   Associated with severe hyperkalemia   Because of this, nephrology was consulted,   It was deemed he will need long-term hemodialysis, acutely secondary to hyperkalemia   Family was contacted and  agreed with treatment plan.   Patient is now status post hemodialysis   Has ongoing hyperkalemia   Will need hemodialysis daily until improvement   Additional recommendations per nephrology   Repeat BMP in the morning    Hypertension, essential- (present on admission)  Assessment & Plan  Hold home Imdur and amlodipine due to shock  This has resolved, no need for Levophed at this time    Abdominal aortic aneurysm (AAA) without rupture (HCC)- (present on admission)  Assessment & Plan  Continue outpatient follow-up   No acute worsening   Does appear at baseline it is around 5.5 cm       Time spent on consultation including chart, medication and laboratory review as well as multiple discussions with intensivist, bedside RN as well as discussion with pharmacy, physical exam took greater than 80 minutes

## 2025-01-30 NOTE — PROGRESS NOTES
Critical Care Progress Note    Date of admission  1/29/2025    Chief Complaint  69 y.o. male SNF resident with HFpEF, COPD on 6L home oxygen, CKD4, AAA 5.5cm, who presented to [facility] with malaise, found to have acute hypercapneic respiratory failiure and THERESA with hyperkalemia.  He was transferred emergently.  HD line placed.  Empiric amp/sulbactam for RLL infiltrate.  No wheezing or apparent exacerbation of COPD.  BiPAP for acute hypercapneic respiratory failure.  Encephalopathy likely toxic/metabolic/hypercapnic.  CT head unrevealing.       Hospital Course  1/29 direct admit from [facility]    Interval Problem Update  Reviewed last 24 hour events:  Reason for admission was reviewed.   Alert, a bit confused.   Got dialysis yesterday, no UF just filtration   K improved to 5.8  Will do another dialysis today   On BIPAP 20/5 at 40%.with good ABG  Will give a trial off BIPAP  HR in 60s  -130s. Off levophed. Was on last night due to hypotension   Hgb 9.5  Plt 170-180  Creatinine 6 (from 4)    On unasyn   On plavix    Addendum:   Pt was taken off BIPAP this am and tolerating at 6L NC.     Review of Systems  Review of Systems   Reason unable to perform ROS: limited due to mental status.        Vital Signs for last 24 hours   Temp:  [35.8 °C (96.5 °F)] 35.8 °C (96.5 °F)  Pulse:  [48-67] 65  Resp:  [14-30] 15  BP: ()/(46-86) 130/84  SpO2:  [93 %-99 %] 94 %    Hemodynamic parameters for last 24 hours       Respiratory Information for the last 24 hours       Physical Exam   Physical Exam  Vitals and nursing note reviewed.   Constitutional:       Appearance: He is ill-appearing. He is not toxic-appearing or diaphoretic.   HENT:      Head: Normocephalic and atraumatic.      Mouth/Throat:      Mouth: Mucous membranes are moist.   Neck:      Comments: Right IJ dialysis cath  Cardiovascular:      Rate and Rhythm: Normal rate and regular rhythm.      Pulses: Normal pulses.      Heart sounds: Normal  heart sounds. No murmur heard.  Pulmonary:      Effort: Pulmonary effort is normal. No respiratory distress.      Breath sounds: Rales present. No wheezing or rhonchi.   Abdominal:      General: Bowel sounds are normal. There is no distension.      Palpations: Abdomen is soft.      Tenderness: There is no abdominal tenderness. There is no guarding.   Musculoskeletal:         General: No swelling or tenderness.      Cervical back: Neck supple.      Right lower leg: No edema.      Left lower leg: No edema.   Skin:     General: Skin is warm and dry.      Coloration: Skin is not jaundiced.   Neurological:      General: No focal deficit present.      Mental Status: He is alert.      Cranial Nerves: No cranial nerve deficit.      Comments: Confused, intermittently following commands         Medications  Current Facility-Administered Medications   Medication Dose Route Frequency Provider Last Rate Last Admin    Respiratory Therapy Consult   Nebulization Continuous RT Nakul Triana M.D.        ipratropium-albuterol (DUONEB) nebulizer solution  3 mL Nebulization Q4HRS (RT) Nakul Triana M.D.   3 mL at 01/30/25 0612    albuterol (Proventil) 2.5mg/0.5ml nebulizer solution 2.5 mg  2.5 mg Nebulization Q2HRS PRN (RT) Nakul Triana M.D.        atorvastatin (Lipitor) tablet 40 mg  40 mg Oral Q EVENING Julián Rodgers D.O.        heparin injection 5,000 Units  5,000 Units Subcutaneous Q8HRS QI Allred Jr..OTate   5,000 Units at 01/30/25 0624    senna-docusate (Pericolace Or Senokot S) 8.6-50 MG per tablet 2 Tablet  2 Tablet Oral Q EVENING Bernardo Johnson Jr. D.OTate        And    polyethylene glycol/lytes (Miralax) Packet 1 Packet  1 Packet Oral QDAY PRN QI Allred Jr..OTate        norepinephrine (Levophed) 8 mg in 250 mL NS infusion (premix)  0-1 mcg/kg/min (Ideal) Intravenous Continuous QI Allred Jr..O.   Stopped at 01/30/25 0659    NS (Bolus) 0.9 % infusion 250 mL  250 mL Intravenous DIALYSIS PRN Booker Eckert  M.D.        clopidogrel (Plavix) tablet 75 mg  75 mg Oral DAILY Nakul Triana M.D.        levothyroxine (Synthroid) tablet 112 mcg  112 mcg Oral AM ES Nakul Triana M.D.        ampicillin/sulbactam (Unasyn) 3 g in  mL IVPB  3 g Intravenous Q EVENING Julián Rodgers D.O.   Stopped at 01/30/25 0245    heparin intracatheter (for DIALYSIS USE ONLY) 1,200 Units  1,200 Units Intracatheter DIALYSIS PRN Booker Eckert M.D.   1,200 Units at 01/30/25 0128    heparin intracatheter (for DIALYSIS USE ONLY) 1,200 Units  1,200 Units Intracatheter DIALYSIS PRCRISTIN Eckert M.D.   1,200 Units at 01/30/25 0127    heparin injection 500 Units  500 Units Intravenous DIALYSIS PRCRISTIN Eckert M.D.   500 Units at 01/29/25 2343       Fluids    Intake/Output Summary (Last 24 hours) at 1/30/2025 0645  Last data filed at 1/30/2025 0620  Gross per 24 hour   Intake 736.23 ml   Output 900 ml   Net -163.77 ml       Laboratory  Recent Labs     01/29/25 2057 01/29/25  2333 01/30/25  0150   ISTATAPH 7.231* 7.249* 7.257*   ISTATAPCO2 69.2* 62.9* 61.1*   ISTATAPO2 101 92 73*   ISTATATCO2 31* 29* 29*   [id number] 96 95 91*   ISTATARTHCO3 29.1* 27.5 27.3   ISTATARTBE 0 -1 -1   ISTATTEMP 97.0 F 34.7 C 35.0 C   ISTATFIO2 60 50 40   ISTATSPEC Arterial Arterial Arterial   ISTATAPHTC 7.243* 7.280* 7.285*   [id number] 95 80* 64*         Recent Labs     01/29/25 2009 01/30/25  0221 01/30/25  0427   SODIUM 137 136 136   POTASSIUM 7.9* 5.8* 5.8*   CHLORIDE 100 102 103   CO2 25 24 26   BUN 88* 55* 59*   CREATININE 6.19* 4.02* 4.41*   CALCIUM 8.5 8.5 8.4*     Recent Labs     01/29/25 2009 01/30/25 0221 01/30/25 0427   ALTSGPT  --   --  14   ASTSGOT  --   --  16   ALKPHOSPHAT  --   --  122*   TBILIRUBIN  --   --  0.4   GLUCOSE 113* 102* 85     Recent Labs     01/29/25 2126 01/30/25 0427   WBC 8.8 7.3   NEUTSPOLYS 71.00 65.30   LYMPHOCYTES 11.20* 13.70*   MONOCYTES 12.00 13.70*   EOSINOPHILS 3.00 3.20   BASOPHILS 0.20 0.10   ASTSGOT  --  16    ALTSGPT  --  14   ALKPHOSPHAT  --  122*   TBILIRUBIN  --  0.4     Recent Labs     01/29/25  2126 01/30/25  0427   RBC 3.33* 3.18*   HEMOGLOBIN 10.1* 9.5*   HEMATOCRIT 33.5* 31.4*   PLATELETCT 180 170       Imaging  X-Ray:  I have personally reviewed the images and compared with prior images.    Assessment/Plan  * Hyperkalemia- (present on admission)  Assessment & Plan  Hyperkalemia of 7.1 from transferring facility with EKG changes peaked T waves.   patient started on HD on admission.   - K down to 5.8  - plan to do another dialysis today  -appreciate nephrology    Toxic metabolic encephalopathy  Assessment & Plan  Patient with AMS from baseline in setting of THERESA on ESRD, acute on chronic hypoxic respiratory failure  Patient at baseline able to follow commands, ambulate with assisstance.  Patient unable to follow complex commands on admission.   - encephalopathy remains  - continue treating underlying cause      (HFpEF) heart failure with preserved ejection fraction (HCC)  Assessment & Plan  Last echo with normal LV function, indeterminate diastolic dysfunction, patient has been on furosemide, carvedilol  as outpatient. Patient's daughter reports that he has history of heart failure with history of bilateral lower extremity edema.   Patient is now with acute on chronic renal failure with hyperkalemia, plan for dialysis.   - pt was hypotensive upon arrival, requiring norepinephrine gtt. Now off  - will hold off resuming antiHTN until stable    Chronic hypoxemic respiratory failure (HCC)  Assessment & Plan  COPD on 6L home oxygen.  CT chest with R basilar infiltrate  Supplemental oxygen for saturation 88-92%    Acute on chronic renal failure (HCC)  Assessment & Plan  THERESA on CKD4, with hyperkalemia. CT with atrophic kidneys, no obstruction.  Anticipate he will now need long term dialysis. Discussed with family members that patient will likely be dependent on long term dialysis and daughter who is NOK in agreement.    -Dr. Montiel consulted prior to transfer, Dr. Eckert on call   -Inserted dialysis catheter.  -Daily CMP     Hypertension, essential- (present on admission)  Assessment & Plan  Holding GDMT and antihypertensives given sepsis    Abdominal aortic aneurysm (AAA) without rupture (HCC)- (present on admission)  Assessment & Plan  Outpatient follow-up, ~5.3cm.  It was 5.5cm in 12/2024 and 3.3cm in 2016.         VTE:  Lovenox  Ulcer: Not Indicated  Lines: Central Line  Ongoing indication addressed    I have performed a physical exam and reviewed and updated ROS and Plan today (1/30/2025). In review of yesterday's note (1/29/2025), there are no changes except as documented above.     Discussed patient condition and risk of morbidity and/or mortality with RN, RT, Pharmacy, and Charge nurse / hot rounds    Can be transferred to telemetry   D/w Hosp medicine    ADDENDUM  Repeat K up to 6.5  Pt awaiting dialysis today   Still tolerating off BIPAP   Will transfer him to IMCU instead  D/w Dr. Rios, Hosp Medicine    Julián Rodgers D.O.

## 2025-01-30 NOTE — PROGRESS NOTES
[facility] Progress Note     HD treatment ordered by Dr Eckert x 2.5 hours.  Treatment Start time: 2309          End time: 1039       Net UF - 0 ml    Patient tolerated treatment. SBP low (70-90's) the first half of the treatment, managed with uf off/pressor titrated up. Then, BP improved the last half of the treatment.    All blood was returned. Non tunneled CVC RIJ locked with heparin (Red limb-1.2 ml; Blue limb-1.2 ml). End cap changed.  See flow sheet for details.     Report given to GAETANO Kauffman RN.

## 2025-01-30 NOTE — ASSESSMENT & PLAN NOTE
Hyperkalemia of 7.1 from transferring facility with EKG changes peaked T waves.   patient started on HD on admission.   - K down to 5.8  - plan to do another dialysis today  -appreciate nephrology

## 2025-01-30 NOTE — ASSESSMENT & PLAN NOTE
Last echo with normal LV function, indeterminate diastolic dysfunction, patient has been on furosemide, carvedilol  as outpatient. Patient's daughter reports that he has history of heart failure with history of bilateral lower extremity edema.   Patient is now with acute on chronic renal failure with hyperkalemia, plan for dialysis.   - pt was hypotensive upon arrival, requiring norepinephrine gtt. Now off  - will hold off resuming antiHTN until stable

## 2025-01-30 NOTE — ASSESSMENT & PLAN NOTE
THERESA on CKD4, with hyperkalemia. CT with atrophic kidneys, no obstruction.  Anticipate he will now need long term dialysis. Discussed with family members that patient will likely be dependent on long term dialysis and daughter who is NOK in agreement.   -Dr. Montiel consulted prior to transfer, Dr. Eckert on call   -Inserted dialysis catheter.  -Daily CMP

## 2025-01-30 NOTE — ASSESSMENT & PLAN NOTE
Patient with AMS from baseline in setting of THERESA on ESRD, acute on chronic hypoxic respiratory failure  Patient at baseline able to follow commands, ambulate with assisstance.  Patient unable to follow complex commands on admission.   - encephalopathy remains  - continue treating underlying cause

## 2025-01-30 NOTE — DISCHARGE PLANNING
NOK/Emergency Contact is Dtr-Sarahi [phone] No ACP Documents.      Hx obtained from Daughter, Sarahi and Caregiver at the SNF Pt was received from.    Pt was living at home until December, when he was Life Flighted to [facility] for seizure like activity. Pt was released about 10 days later and went to SNF-[facility] in [place] where he requires a moderate level of assist with all AD/IADLs. Pt is blind in both eyes, he had sight in his right eye until a few years ago but hasn't had sight in his left eye since young adulthood. Pt has a FWW that he refuses to use most of the time. Daughter reports pt being well oriented-with exception of time because of his blindness-before going to SNF. She says his BL O2 use is 3L versus what SNF reports is 6L.   No Hx of Mental Health Dx or Substance Abuse.  Pt is retired and receives $1800.00 monthly in Social Security. His PCP is Melissa Chew in [place], Pharmacy is [facility] in [place].    Medicare Part A&B coverage is active.     Care Transition Team Assessment    Information Source  Orientation Level: Oriented to place, Oriented to person  Information Given By: Caregiver, Daughter  Who is responsible for making decisions for patient? : Legal next of kin  Name(s) of Primary Decision Maker: Sarahi Peters    Readmission Evaluation  Is this a readmission?: No    Elopement Risk  Legal Hold: No  Ambulatory or Self Mobile in Wheelchair: No-Not an Elopement Risk  Elopement Risk: Not at Risk for Elopement    Discharge Preparedness  What is your plan after discharge?: Skilled nursing facility  What are your discharge supports?: Child  Prior Functional Level: Ambulatory, Needs Assist with Activities of Daily Living, Needs Assist with Medication Management, Uses Walker, Uses Wheelchair  Difficulity with ADLs: Bathing, Brushing teeth, Dressing, Eating, Toileting  Difficulity with IADLs: Cooking, Driving, Keeping track of finances, Laundry, Managing medication, Shopping,  Using the telephone or computer    Functional Assesment  Prior Functional Level: Ambulatory, Needs Assist with Activities of Daily Living, Needs Assist with Medication Management, Uses Walker     Finances  Financial Barriers to Discharge: No  Prescription Coverage: Yes    Vision / Hearing Impairment  Right Eye Vision: Blind  Left Eye Vision: Blind    Advance Directive  Advance Directive?: None, Clinically incapacitated / Inappropriate    Psychological Assessment  History of Substance Abuse: None  History of Psychiatric Problems: No  Non-compliant with Treatment: No  Newly Diagnosed Illness: Yes    Discharge Risks or Barriers  Discharge risks or barriers?: Cognitive/Sensory deficits     Anticipated Discharge Information  Discharge Disposition: D/T to SNF with Medicare cert in anticipation of skilled care (03)  Discharge Address: [facility] ([address], [place] [address])  Discharge Contact Phone Number: Dtr- Sarahi ([phone])

## 2025-01-30 NOTE — PROGRESS NOTES
Med rec complete per [facility] in [place]. NO Anticoags.  Pt was on 2 days of Azithromycin for Pneumonia.

## 2025-01-30 NOTE — PROGRESS NOTES
Pt admitted to T915/01. Connected to monitor and wall O2, BiPAP.      Does patient consent to inventory of belongings:     Patient does NOT consent to inventory of belongings    Belongings at bedside:  Clothing      HR: 58  BP: 126/59  RR: 30  SpO2: 96% on 70% BIPAP  Temp: 96.5°F Temporal  Wt: 99.3kg  Pain: NRS  0      4 Eyes Skin Assessment Completed by Karel RN and Adolfo RN.    Head WDL  Ears Redness and Blanching  Nose Redness and Non-Blanching  Mouth WDL  Neck WDL  Breast/Chest WDL  Shoulder Blades WDL  Spine WDL  Back Scar  (R) Upper Arm WDL  (R) Elbow Scab and Swelling, skin tear  (R) Lower Arm Bruising  (R) Hand Bruising  (L) Upper Arm Bruising  (L) Elbow Redness and Non-Blanching  (L) Lower Arm Bruising  (L) HandBruising  Abdomen Scar  Groin WDL  Scrotum WDL  Coccyx Redness and Blanching  Buttocks Redness and Blanching  (R) Upper Leg WDL  (R) Knee WDL  (R) Lower Leg WDL  (R) Heel Redness and Blanching  (R) Foot Dry, Hyperpigmentation  (R) Toe Dry, Hyperpigmentation  (L) Upper Leg WDL  (L) Knee Scar  (L) Lower Leg WDL  (L) Heel Redness and Blanching, DTI  (L) Foot Dry, Hyperpigmentation  (L) Toe Dry, Hyperpigmentation      Devices In Places Pulse Ox, SCD's, Blood Pressure Cuff, ECG, PIV x2, Reina, and Bipap      Interventions In Place Q2 Turns, Sacral Mepilex, TAP System, Pillows, Wedges, Heels Loaded W/Pillows, Low Air Loss Mattress, ZFlo Pillow, Heel Mepilex, Elbow Mepilex, and Heel Float Boots    Possible Skin Injury Yes    Pictures Uploaded Into Epic Yes  Wound Consult Placed Yes  RN Wound Prevention Protocol Ordered Yes

## 2025-01-30 NOTE — PROGRESS NOTES
[facility] -  PROGRESS NOTE               Author: Angeles Montiel D.O. Date & Time: 1/30/2025  6:08 AM     HPI:  This is a 69-year-old male with past medical history of HFpEF, COPD on 6L NC at baseline, CKD stage IV with last Scr in the office of 2.3, infrarenal AAA, blindness who lives in a skilled nursing facility in [place].  Over the last several days has been more confused with generalized weakness.  He was sent to the ER at [facility] in [place] where he was found to be tachypneic with a normal heart rate and blood pressure.  Labs showed acute on chronic kidney disease with a creatinine of 6.57 and a potassium of 7.8.  He was given hyperkalemia protocol of IV calcium, albuterol and insulin.  He became mildly hypotensive therefore was given 2 L of NS and a potassium was repeated which was 7.6.  Calcium and albuterol were repeated.  Last VBG showed a pH of 7.1, pCO2 74, PaO2 80, bicarb 27, base excess -2.  A CT of the head was performed for his altered mental status which showed no acute abnormalities, CT of his abdomen showed no obstructing stone however atrophied kidneys and stable calcified infrarenal AAA, the chest portion of his CT abdomen did show a right lower lobe infiltrate and bilateral effusions and he has been given some for the possible pneumonia.  He was started on BiPAP to treat his hypercapnia.  He was also given IV Bicarbonate for his acidosis. Goals of care were discussed by the sending physician with his family who states that the patient wanted everything done including dialysis, CPR, intubation.He was flighted to [facility] for advanced care. Nephrology was consulted for THERESA/Hyperkalemia from [place] and notified by [facility] on arrival. He is currently on BiPAP in the ICU. He is currently hemodynamically stable but his BP is soft with systolics in the low 90s.  Scr is 6.19 here and Bicarb is 25. Rest of BMP is pending. Patient is not able to provide  history. This information is obtained from the medical record.     DAILY NEPHROLOGY SUMMARY:  01/29 - consult done, had HD  01/30 - K improved to 5.8, SBP 100s-130s, no UOP recorded, on BiPAP, answers some questions, slightly confused, no complaints    REVIEW OF SYSTEMS:    Limited due to BiPAP/mild confusion    PMH/PSH/SH/FH: Reviewed and unchanged since admission note  CURRENT MEDICATIONS: Reviewed from admission to present day    VS:  /60   Pulse 63   Temp 35.8 °C (96.5 °F) (Temporal)   Resp 17   Ht 1.829 m (6')   Wt 99.3 kg (218 lb 14.7 oz)   SpO2 94%   BMI 29.69 kg/m²   Physical Exam  Vitals and nursing note reviewed.   Constitutional:       General: He is not in acute distress.     Appearance: He is ill-appearing.   HENT:      Head: Normocephalic and atraumatic.      Right Ear: External ear normal.      Left Ear: External ear normal.      Nose: Nose normal.      Mouth/Throat:      Mouth: Mucous membranes are moist.      Pharynx: Oropharynx is clear.   Eyes:      General: No scleral icterus.     Extraocular Movements: Extraocular movements intact.   Cardiovascular:      Rate and Rhythm: Normal rate.   Pulmonary:      Effort: No tachypnea or accessory muscle usage.      Breath sounds: No wheezing.      Comments: BiPAP  Abdominal:      General: There is no distension.      Palpations: Abdomen is soft.   Musculoskeletal:      Right lower leg: Edema present.      Left lower leg: Edema present.   Skin:     General: Skin is warm and dry.      Coloration: Skin is pale.   Neurological:      Mental Status: He is alert.      Cranial Nerves: No cranial nerve deficit.      Motor: No seizure activity.   Psychiatric:         Mood and Affect: Mood normal.         Behavior: Behavior normal.         FLUID BALANCE:  No intake/output data recorded.    LABS:  Recent Labs     01/29/25  2009 01/30/25  0221 01/30/25  0427   SODIUM 137 136 136   POTASSIUM 7.9* 5.8* 5.8*   CHLORIDE 100 102 103   CO2 25 24 26   GLUCOSE 113*  102* 85   BUN 88* 55* 59*   CREATININE 6.19* 4.02* 4.41*   CALCIUM 8.5 8.5 8.4*     Recent Labs     01/29/25  2126 01/30/25  0427   WBC 8.8 7.3   RBC 3.33* 3.18*   HEMOGLOBIN 10.1* 9.5*   HEMATOCRIT 33.5* 31.4*   .6* 98.7*   MCH 30.3 29.9   MCHC 30.1* 30.3*   RDW 66.5* 65.4*   PLATELETCT 180 170   MPV 11.1 11.0     IMAGING:  All imaging reviewed from admission to present day     IMPRESSION:  # THERESA - etiology is unclear but suspect possible Sepsis/ATN given imaging results(PNA)/Hypoperfusion.  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 Please call if questions

## 2025-01-30 NOTE — PROCEDURES
ULTRASOUND-GUIDED TEMPORARY DIALYSIS CATHETER PLACEMENT PROCEDURE NOTE      Date: 1/29/2025  Time: 8:08 PM    Time out: performed. Name, MRN, allergy and procedure were confirmed.     Indication: hyperkalemia in setting of anuric ESRD     Consent: Informed consent obtained from next of kin: daughter Fran Quinonez after explaining the benefits/risks along with alternatives. All questions were answered and the patient agreed to proceed.     Procedure: ultrasound-guided central line placement    Location: Right IJ    Sedation: 1% lidocaine    Findings: Area of interest was swabbed with chlorhexidine three times. Patient was then draped in sterile fashion. All sterile precautions followed, including hand hygiene, sterile glove and gown, caps and mask, chlorhexidine skin prep and full barrier precautions. Patient is placed in Tredelenburg position. 1% lidocaine was administered for local anesthesia. 16 or 18 cm triple lumen was placed with Seldinger technique using ultrasound guidance. Venous transducer was used to confirm placement. The line was then secured with sutures.     Number of attempt: 2    Complications: The patient tolerated the procedure well with no immediate complications. The vital signs remained stable throughout the procedure. Confirmed placement of catheter via CXR with Dr. Triana     Estimated blood loss: minimal    Plan: a post central line placement chest x-ray was ordered to confirm placement.     Jb Jara M.D.  Critical Care

## 2025-01-31 VITALS
BODY MASS INDEX: 29.38 KG/M2 | HEIGHT: 72 IN | TEMPERATURE: 96.5 F | SYSTOLIC BLOOD PRESSURE: 148 MMHG | HEART RATE: 68 BPM | DIASTOLIC BLOOD PRESSURE: 63 MMHG | OXYGEN SATURATION: 95 % | RESPIRATION RATE: 18 BRPM | WEIGHT: 216.93 LBS

## 2025-01-31 PROBLEM — R57.9 SHOCK (HCC): Status: ACTIVE | Noted: 2025-01-31

## 2025-01-31 PROBLEM — J15.9 BACTERIAL PNEUMONIA: Status: ACTIVE | Noted: 2025-01-31

## 2025-01-31 LAB
ALBUMIN SERPL BCP-MCNC: 2.8 G/DL (ref 3.2–4.9)
ALBUMIN SERPL BCP-MCNC: 2.8 G/DL (ref 3.2–4.9)
ALBUMIN/GLOB SERPL: 0.8 G/DL
ALBUMIN/GLOB SERPL: 0.9 G/DL
ALP SERPL-CCNC: 120 U/L (ref 30–99)
ALP SERPL-CCNC: 134 U/L (ref 30–99)
ALT SERPL-CCNC: 10 U/L (ref 2–50)
ALT SERPL-CCNC: 12 U/L (ref 2–50)
ANION GAP SERPL CALC-SCNC: 10 MMOL/L (ref 7–16)
ANION GAP SERPL CALC-SCNC: 7 MMOL/L (ref 7–16)
ANION GAP SERPL CALC-SCNC: 8 MMOL/L (ref 7–16)
ANION GAP SERPL CALC-SCNC: 9 MMOL/L (ref 7–16)
ARTERIAL PATENCY WRIST A: ABNORMAL
AST SERPL-CCNC: 18 U/L (ref 12–45)
AST SERPL-CCNC: 20 U/L (ref 12–45)
BASE EXCESS BLDA CALC-SCNC: 1 MMOL/L (ref -4–3)
BASE EXCESS BLDA CALC-SCNC: 5 MMOL/L (ref -4–3)
BASOPHILS # BLD AUTO: 0.4 % (ref 0–1.8)
BASOPHILS # BLD: 0.03 K/UL (ref 0–0.12)
BILIRUB SERPL-MCNC: 0.6 MG/DL (ref 0.1–1.5)
BILIRUB SERPL-MCNC: 0.7 MG/DL (ref 0.1–1.5)
BODY TEMPERATURE: ABNORMAL DEGREES
BODY TEMPERATURE: ABNORMAL DEGREES
BUN SERPL-MCNC: 24 MG/DL (ref 8–22)
BUN SERPL-MCNC: 31 MG/DL (ref 8–22)
BUN SERPL-MCNC: 35 MG/DL (ref 8–22)
BUN SERPL-MCNC: 37 MG/DL (ref 8–22)
CALCIUM ALBUM COR SERPL-MCNC: 10.2 MG/DL (ref 8.5–10.5)
CALCIUM ALBUM COR SERPL-MCNC: 9.9 MG/DL (ref 8.5–10.5)
CALCIUM SERPL-MCNC: 8.8 MG/DL (ref 8.5–10.5)
CALCIUM SERPL-MCNC: 8.9 MG/DL (ref 8.5–10.5)
CALCIUM SERPL-MCNC: 8.9 MG/DL (ref 8.5–10.5)
CALCIUM SERPL-MCNC: 9.2 MG/DL (ref 8.5–10.5)
CHLORIDE SERPL-SCNC: 100 MMOL/L (ref 96–112)
CHLORIDE SERPL-SCNC: 100 MMOL/L (ref 96–112)
CHLORIDE SERPL-SCNC: 101 MMOL/L (ref 96–112)
CHLORIDE SERPL-SCNC: 101 MMOL/L (ref 96–112)
CO2 BLDA-SCNC: 31 MMOL/L (ref 32–48)
CO2 BLDA-SCNC: 31 MMOL/L (ref 32–48)
CO2 SERPL-SCNC: 25 MMOL/L (ref 20–33)
CO2 SERPL-SCNC: 27 MMOL/L (ref 20–33)
CO2 SERPL-SCNC: 27 MMOL/L (ref 20–33)
CO2 SERPL-SCNC: 28 MMOL/L (ref 20–33)
CREAT SERPL-MCNC: 2.24 MG/DL (ref 0.5–1.4)
CREAT SERPL-MCNC: 2.51 MG/DL (ref 0.5–1.4)
CREAT SERPL-MCNC: 2.89 MG/DL (ref 0.5–1.4)
CREAT SERPL-MCNC: 3.06 MG/DL (ref 0.5–1.4)
DELSYS IDSYS: ABNORMAL
EOSINOPHIL # BLD AUTO: 0.18 K/UL (ref 0–0.51)
EOSINOPHIL NFR BLD: 2.4 % (ref 0–6.9)
ERYTHROCYTE [DISTWIDTH] IN BLOOD BY AUTOMATED COUNT: 64.5 FL (ref 35.9–50)
FERRITIN SERPL-MCNC: 313 NG/ML (ref 22–322)
GFR SERPLBLD CREATININE-BSD FMLA CKD-EPI: 21 ML/MIN/1.73 M 2
GFR SERPLBLD CREATININE-BSD FMLA CKD-EPI: 23 ML/MIN/1.73 M 2
GFR SERPLBLD CREATININE-BSD FMLA CKD-EPI: 27 ML/MIN/1.73 M 2
GFR SERPLBLD CREATININE-BSD FMLA CKD-EPI: 31 ML/MIN/1.73 M 2
GLOBULIN SER CALC-MCNC: 3.1 G/DL (ref 1.9–3.5)
GLOBULIN SER CALC-MCNC: 3.4 G/DL (ref 1.9–3.5)
GLUCOSE SERPL-MCNC: 101 MG/DL (ref 65–99)
GLUCOSE SERPL-MCNC: 102 MG/DL (ref 65–99)
GLUCOSE SERPL-MCNC: 76 MG/DL (ref 65–99)
GLUCOSE SERPL-MCNC: 76 MG/DL (ref 65–99)
HCO3 BLDA-SCNC: 28.8 MMOL/L (ref 21–28)
HCO3 BLDA-SCNC: 30 MMOL/L (ref 21–28)
HCT VFR BLD AUTO: 32.7 % (ref 42–52)
HGB BLD-MCNC: 10.1 G/DL (ref 14–18)
HOROWITZ INDEX BLDA+IHG-RTO: 170 MM[HG]
IMM GRANULOCYTES # BLD AUTO: 0.12 K/UL (ref 0–0.11)
IMM GRANULOCYTES NFR BLD AUTO: 1.6 % (ref 0–0.9)
IRON SATN MFR SERPL: 28 % (ref 15–55)
IRON SERPL-MCNC: 57 UG/DL (ref 50–180)
LACTATE BLD-SCNC: 0.3 MMOL/L (ref 0.5–2)
LYMPHOCYTES # BLD AUTO: 0.89 K/UL (ref 1–4.8)
LYMPHOCYTES NFR BLD: 11.9 % (ref 22–41)
MCH RBC QN AUTO: 30.6 PG (ref 27–33)
MCHC RBC AUTO-ENTMCNC: 30.9 G/DL (ref 32.3–36.5)
MCV RBC AUTO: 99.1 FL (ref 81.4–97.8)
MONOCYTES # BLD AUTO: 1.15 K/UL (ref 0–0.85)
MONOCYTES NFR BLD AUTO: 15.4 % (ref 0–13.4)
NEUTROPHILS # BLD AUTO: 5.1 K/UL (ref 1.82–7.42)
NEUTROPHILS NFR BLD: 68.3 % (ref 44–72)
NRBC # BLD AUTO: 0 K/UL
NRBC BLD-RTO: 0 /100 WBC (ref 0–0.2)
O2/TOTAL GAS SETTING VFR VENT: 30 %
PCO2 BLDA: 45.2 MMHG (ref 32–48)
PCO2 BLDA: 58.7 MMHG (ref 32–48)
PCO2 TEMP ADJ BLDA: 43.8 MMHG (ref 32–48)
PCO2 TEMP ADJ BLDA: 57.9 MMHG (ref 32–48)
PH BLDA: 7.3 [PH] (ref 7.35–7.45)
PH BLDA: 7.43 [PH] (ref 7.35–7.45)
PH TEMP ADJ BLDA: 7.3 [PH] (ref 7.35–7.45)
PH TEMP ADJ BLDA: 7.44 [PH] (ref 7.35–7.45)
PHOSPHATE SERPL-MCNC: 3.6 MG/DL (ref 2.5–4.5)
PLATELET # BLD AUTO: 165 K/UL (ref 164–446)
PMV BLD AUTO: 11 FL (ref 9–12.9)
PO2 BLDA: 51 MMHG (ref 83–108)
PO2 BLDA: 68 MMHG (ref 83–108)
PO2 TEMP ADJ BLDA: 48 MMHG (ref 83–108)
PO2 TEMP ADJ BLDA: 67 MMHG (ref 83–108)
POTASSIUM SERPL-SCNC: 4.4 MMOL/L (ref 3.6–5.5)
POTASSIUM SERPL-SCNC: 4.6 MMOL/L (ref 3.6–5.5)
POTASSIUM SERPL-SCNC: 5.3 MMOL/L (ref 3.6–5.5)
POTASSIUM SERPL-SCNC: 5.6 MMOL/L (ref 3.6–5.5)
PROT SERPL-MCNC: 5.9 G/DL (ref 6–8.2)
PROT SERPL-MCNC: 6.2 G/DL (ref 6–8.2)
RBC # BLD AUTO: 3.3 M/UL (ref 4.7–6.1)
SAO2 % BLDA: 86 % (ref 93–99)
SAO2 % BLDA: 91 % (ref 93–99)
SODIUM SERPL-SCNC: 134 MMOL/L (ref 135–145)
SODIUM SERPL-SCNC: 135 MMOL/L (ref 135–145)
SODIUM SERPL-SCNC: 136 MMOL/L (ref 135–145)
SODIUM SERPL-SCNC: 138 MMOL/L (ref 135–145)
SPECIMEN DRAWN FROM PATIENT: ABNORMAL
SPECIMEN DRAWN FROM PATIENT: ABNORMAL
TIBC SERPL-MCNC: 202 UG/DL (ref 250–450)
UIBC SERPL-MCNC: 145 UG/DL (ref 110–370)
WBC # BLD AUTO: 7.5 K/UL (ref 4.8–10.8)

## 2025-01-31 PROCEDURE — 700102 HCHG RX REV CODE 250 W/ 637 OVERRIDE(OP): Performed by: INTERNAL MEDICINE

## 2025-01-31 PROCEDURE — 92526 ORAL FUNCTION THERAPY: CPT

## 2025-01-31 PROCEDURE — 94660 CPAP INITIATION&MGMT: CPT

## 2025-01-31 PROCEDURE — 84100 ASSAY OF PHOSPHORUS: CPT

## 2025-01-31 PROCEDURE — 82803 BLOOD GASES ANY COMBINATION: CPT

## 2025-01-31 PROCEDURE — 700111 HCHG RX REV CODE 636 W/ 250 OVERRIDE (IP): Mod: JZ | Performed by: INTERNAL MEDICINE

## 2025-01-31 PROCEDURE — 94640 AIRWAY INHALATION TREATMENT: CPT

## 2025-01-31 PROCEDURE — 700111 HCHG RX REV CODE 636 W/ 250 OVERRIDE (IP)

## 2025-01-31 PROCEDURE — 97602 WOUND(S) CARE NON-SELECTIVE: CPT

## 2025-01-31 PROCEDURE — 700101 HCHG RX REV CODE 250: Performed by: EMERGENCY MEDICINE

## 2025-01-31 PROCEDURE — 83540 ASSAY OF IRON: CPT

## 2025-01-31 PROCEDURE — 90935 HEMODIALYSIS ONE EVALUATION: CPT

## 2025-01-31 PROCEDURE — 85025 COMPLETE CBC W/AUTO DIFF WBC: CPT

## 2025-01-31 PROCEDURE — 700101 HCHG RX REV CODE 250: Performed by: INTERNAL MEDICINE

## 2025-01-31 PROCEDURE — A9270 NON-COVERED ITEM OR SERVICE: HCPCS | Performed by: INTERNAL MEDICINE

## 2025-01-31 PROCEDURE — A9270 NON-COVERED ITEM OR SERVICE: HCPCS | Performed by: EMERGENCY MEDICINE

## 2025-01-31 PROCEDURE — 83605 ASSAY OF LACTIC ACID: CPT

## 2025-01-31 PROCEDURE — 83550 IRON BINDING TEST: CPT

## 2025-01-31 PROCEDURE — 82728 ASSAY OF FERRITIN: CPT

## 2025-01-31 PROCEDURE — 99291 CRITICAL CARE FIRST HOUR: CPT | Performed by: INTERNAL MEDICINE

## 2025-01-31 PROCEDURE — 36600 WITHDRAWAL OF ARTERIAL BLOOD: CPT

## 2025-01-31 PROCEDURE — 700105 HCHG RX REV CODE 258: Performed by: INTERNAL MEDICINE

## 2025-01-31 PROCEDURE — 80048 BASIC METABOLIC PNL TOTAL CA: CPT

## 2025-01-31 PROCEDURE — 770000 HCHG ROOM/CARE - INTERMEDIATE ICU *

## 2025-01-31 PROCEDURE — 700102 HCHG RX REV CODE 250 W/ 637 OVERRIDE(OP): Performed by: EMERGENCY MEDICINE

## 2025-01-31 PROCEDURE — 80053 COMPREHEN METABOLIC PANEL: CPT

## 2025-01-31 PROCEDURE — 700111 HCHG RX REV CODE 636 W/ 250 OVERRIDE (IP): Performed by: INTERNAL MEDICINE

## 2025-01-31 RX ORDER — IPRATROPIUM BROMIDE AND ALBUTEROL SULFATE 2.5; .5 MG/3ML; MG/3ML
3 SOLUTION RESPIRATORY (INHALATION)
Status: DISCONTINUED | OUTPATIENT
Start: 2025-01-31 | End: 2025-02-02

## 2025-01-31 RX ORDER — HEPARIN SODIUM 1000 [USP'U]/ML
INJECTION, SOLUTION INTRAVENOUS; SUBCUTANEOUS
Status: COMPLETED
Start: 2025-01-31 | End: 2025-01-31

## 2025-01-31 RX ADMIN — IPRATROPIUM BROMIDE AND ALBUTEROL SULFATE 3 ML: .5; 2.5 SOLUTION RESPIRATORY (INHALATION) at 13:57

## 2025-01-31 RX ADMIN — HEPARIN SODIUM 1200 UNITS: 1000 INJECTION, SOLUTION INTRAVENOUS; SUBCUTANEOUS at 16:54

## 2025-01-31 RX ADMIN — IPRATROPIUM BROMIDE AND ALBUTEROL SULFATE 3 ML: .5; 2.5 SOLUTION RESPIRATORY (INHALATION) at 06:36

## 2025-01-31 RX ADMIN — IPRATROPIUM BROMIDE AND ALBUTEROL SULFATE 3 ML: .5; 2.5 SOLUTION RESPIRATORY (INHALATION) at 18:46

## 2025-01-31 RX ADMIN — CLOPIDOGREL BISULFATE 75 MG: 75 TABLET, FILM COATED ORAL at 08:26

## 2025-01-31 RX ADMIN — HEPARIN SODIUM 5000 UNITS: 5000 INJECTION, SOLUTION INTRAVENOUS; SUBCUTANEOUS at 22:12

## 2025-01-31 RX ADMIN — LEVOTHYROXINE SODIUM 112 MCG: 0.11 TABLET ORAL at 08:26

## 2025-01-31 RX ADMIN — AMPICILLIN AND SULBACTAM 3 G: 1; 2 INJECTION, POWDER, FOR SOLUTION INTRAMUSCULAR; INTRAVENOUS at 18:06

## 2025-01-31 RX ADMIN — ATORVASTATIN CALCIUM 40 MG: 40 TABLET, FILM COATED ORAL at 18:07

## 2025-01-31 RX ADMIN — HEPARIN SODIUM 5000 UNITS: 5000 INJECTION, SOLUTION INTRAVENOUS; SUBCUTANEOUS at 13:20

## 2025-01-31 RX ADMIN — IPRATROPIUM BROMIDE AND ALBUTEROL SULFATE 3 ML: .5; 2.5 SOLUTION RESPIRATORY (INHALATION) at 10:46

## 2025-01-31 RX ADMIN — HEPARIN SODIUM 5000 UNITS: 5000 INJECTION, SOLUTION INTRAVENOUS; SUBCUTANEOUS at 06:19

## 2025-01-31 ASSESSMENT — PAIN DESCRIPTION - PAIN TYPE
TYPE: ACUTE PAIN

## 2025-01-31 ASSESSMENT — FIBROSIS 4 INDEX: FIB4 SCORE: 2.64

## 2025-01-31 ASSESSMENT — PULMONARY FUNCTION TESTS
EPAP_CMH2O: 5
EPAP_CMH2O: 5

## 2025-01-31 NOTE — CARE PLAN
The patient is Watcher - Medium risk of patient condition declining or worsening    Shift Goals  Clinical Goals: pulmonary hygiene, drecrease agitation, encourage bipap  Patient Goals: go home, get out of the hospital  Family Goals: silvia    Progress made toward(s) clinical / shift goals:    Problem: Skin Integrity  Goal: Skin integrity is maintained or improved  Outcome: Progressing     Problem: Fall Risk  Goal: Patient will remain free from falls  Outcome: Progressing     Problem: Pain - Standard  Goal: Alleviation of pain or a reduction in pain to the patient’s comfort goal  Outcome: Progressing       Patient is not progressing towards the following goals:      Problem: Knowledge Deficit - Standard  Goal: Patient and family/care givers will demonstrate understanding of plan of care, disease process/condition, diagnostic tests and medications  Outcome: Not Progressing  no evidence of learning and family is not bedside for pt education    Problem: Hemodynamics  Goal: Patient's hemodynamics, fluid balance and neurologic status will be stable or improve  Outcome: Not Progressing  Pt blood pressure dropped down to 70s to 80s systolic for about 45min overnight.      Problem: Respiratory  Goal: Patient will achieve/maintain optimum respiratory ventilation and gas exchange  Outcome: Not Progressing  Pt needed to start on continuous bipap. Pt not tolerating bipap mask and needed to start on precedex

## 2025-01-31 NOTE — ASSESSMENT & PLAN NOTE
Acute kidney injury on top on chronic kidney disease   Associated with severe hyperkalemia   Because of this, nephrology was consulted,   It was deemed he will need long-term hemodialysis, acutely secondary to hyperkalemia   Family was contacted and agreed with treatment plan.   Patient is now status post hemodialysis   Has ongoing hyperkalemia   Will need hemodialysis daily until improvement   Additional recommendations per nephrology   Repeat BMP in the morning    1/31  Continues to develop hyperkalemia shortly after hemodialysis  Plan is for dialysis again today as well as tomorrow  Repeat BMP in the morning

## 2025-01-31 NOTE — PROGRESS NOTES
[facility] Services Progress Note     HD treatment ordered by Dr Montiel x 3 hours.  Treatment Start time: 1546                     End time: 1846        Net UF - 2000 ml     Patient tolerated treatment.      All blood returned.Non tunneled CVC R IJ ports flushed with 10ml saline, locked with heparin (Red limb-1.2 ml; Blue limb-1.2 ml), capped.    See flow sheet for details.      Report given to DIANA Feliciano RN.

## 2025-01-31 NOTE — PROGRESS NOTES
Hospital Medicine Daily Progress Note    Date of Service  1/31/2025    Chief Complaint  Pb Peters is a 69 y.o. male admitted 1/29/2025 with acute hypercapnic respiratory failure, acute on chronic kidney injury and hyperkalemia.    Hospital Course  69-year-old male with a history of COPD requiring 6 L nasal cannula baseline, chronic kidney disease stage IV, infrarenal abdominal aortic aneurysm presented from [facility] due to acute hypercapnic respiratory failure, acute on chronic kidney disease causing hyperkalemia.  Labs there showed a creatinine of 6.57 with potassium of 7.8.  Patient was treated for his hyperkalemia and transferred here for need for urgent hemodialysis.  Patient was also noted to have a pH of 7.1 with a pCO2 of 74 on VBG.  CT scan of his abdomen showed a stable infrarenal abdominal aortic aneurysm, chest portion did show right lower lobe infiltrate and bilateral effusions.    Interval Problem Update  Patient has had improvement in his respiratory status, he is satting well on 8 to 10 L, baseline is 6.  Patient remains confused, I am unsure what his baseline is, he resides in a skilled nursing facility.  Patient continues to have hemodialysis, will have it again today as well as tomorrow.  Patient does have mild hyperkalemia again    Overnight  Patient became more agitated  Also developed hypotension    I have discussed this patient's plan of care and discharge plan at IDT rounds today with Case Management, Nursing, Nursing leadership, and other members of the IDT team.    Consultants/Specialty  nephrology    Code Status  Full Code    Disposition  The patient is not medically cleared for discharge to home or a post-acute facility.  Anticipate discharge to: skilled nursing facility    I have placed the appropriate orders for post-discharge needs.    Review of Systems  Review of Systems   Unable to perform ROS: Mental acuity        Physical Exam  Pulse:   [54-79] 75  Resp:  [13-59] 21  BP: ()/(46-86) 161/70  SpO2:  [83 %-99 %] 97 %    Physical Exam  Vitals and nursing note reviewed.   Constitutional:       General: He is not in acute distress.     Appearance: He is well-developed. He is ill-appearing. He is not toxic-appearing or diaphoretic.   HENT:      Head: Normocephalic and atraumatic.      Right Ear: External ear normal.      Left Ear: External ear normal.      Nose: Nose normal. No congestion or rhinorrhea.      Mouth/Throat:      Pharynx: No oropharyngeal exudate.   Eyes:      General:         Right eye: No discharge.         Left eye: No discharge.   Neck:      Trachea: No tracheal deviation.   Cardiovascular:      Rate and Rhythm: Normal rate and regular rhythm.      Heart sounds: No murmur heard.     No friction rub. No gallop.   Pulmonary:      Effort: Pulmonary effort is normal. No respiratory distress.      Breath sounds: No stridor. Rales present. No wheezing.   Abdominal:      General: Bowel sounds are normal. There is no distension.      Palpations: Abdomen is soft.   Musculoskeletal:         General: Normal range of motion.      Cervical back: Normal range of motion and neck supple. No edema or erythema.      Right lower leg: Edema present.      Left lower leg: Edema present.   Lymphadenopathy:      Cervical: No cervical adenopathy.   Skin:     General: Skin is warm and dry.      Findings: No erythema or rash.   Neurological:      Mental Status: He is alert.      Comments: Awake but confused   Psychiatric:         Speech: Speech is delayed.         Behavior: Behavior is slowed.         Cognition and Memory: Cognition is impaired. Memory is impaired.      Comments: Having a conversation by himself prior to exam         Fluids    Intake/Output Summary (Last 24 hours) at 1/31/2025 1320  Last data filed at 1/31/2025 1200  Gross per 24 hour   Intake 805.1 ml   Output 5145 ml   Net -4339.9 ml        Laboratory  Recent Labs     01/29/25  4311  01/30/25  0427 01/31/25  0202   WBC 8.8 7.3 7.5   RBC 3.33* 3.18* 3.30*   HEMOGLOBIN 10.1* 9.5* 10.1*   HEMATOCRIT 33.5* 31.4* 32.7*   .6* 98.7* 99.1*   MCH 30.3 29.9 30.6   MCHC 30.1* 30.3* 30.9*   RDW 66.5* 65.4* 64.5*   PLATELETCT 180 170 165   MPV 11.1 11.0 11.0     Recent Labs     01/30/25 1952 01/31/25  0202 01/31/25  0750   SODIUM 137 135 134*   POTASSIUM 4.9 5.3 5.6*   CHLORIDE 100 101 101   CO2 26 27 25   GLUCOSE 83 76 76   BUN 31* 35* 37*   CREATININE 2.89* 2.89* 3.06*   CALCIUM 8.8 8.9 9.2                   Imaging  DX-CHEST-PORTABLE (1 VIEW)   Final Result      Right IJ catheter terminates in the region of the SVC. No pneumothorax.      Bibasilar opacities.           Assessment/Plan  * Hyperkalemia- (present on admission)  Assessment & Plan  Secondary to acute on chronic kidney disease  Persistent, will need ongoing hemodialysis  Closely monitor on telemetry  Patient is at high risk of worsening and will need close monitoring in the IMCU due to ongoing hyperkalemia  Plan is for hemodialysis shortly, no need for treatment of his hyperkalemia  Repeat BMP in the morning    1/31  Dialysis again today and tomorrow    Bacterial pneumonia- (present on admission)  Assessment & Plan  Was a concern for bacterial pneumonia so patient was placed on antibiotics  Continue Unasyn  Obtain procalcitonin to assist with antibiotic cessation    Shock (HCC)  Assessment & Plan  Occurred overnight  Lasted a short amount of time, Levophed was able to be weaned off    Toxic metabolic encephalopathy  Assessment & Plan  Patient continues to be encephalopathic  Likely uremic due to acute on chronic kidney disease  Also possibly secondary to pneumonia  Continue Unasyn  Hemodialysis per nephrology  CT head showed nothing acute    1/31  Overnight associated with severe agitation  Patient was placed in restraints and Precedex drip was initiated    Patient is critically ill.   The patient continues to have : agitation  The vital  organ system that is effected is the: neuro initially    If untreated there is a high chance of deterioration into: permanent brain injury  The critical care that I am providing today is: precedex gtt  The critical care that has been undertaken is medically complex.   There has been no overlap in critical care time.  Critical care time not including procedures, no overlap: 38 minutes    Chronic hypoxemic respiratory failure (HCC)  Assessment & Plan  Patient does have a history of COPD and does require 6 L of oxygen at baseline   CT chest showed changes consistent with right lower lobe pneumonia   Patient has been started on Unasyn and azithromycin   Patient did have acute worsening and was requiring BiPAP however now with good sats on 8-10 L  Patient continues to have decreased breath sounds, this is likely chronic, no need for steroids at this time    Acute on chronic renal failure (HCC)  Assessment & Plan  Acute kidney injury on top on chronic kidney disease   Associated with severe hyperkalemia   Because of this, nephrology was consulted,   It was deemed he will need long-term hemodialysis, acutely secondary to hyperkalemia   Family was contacted and agreed with treatment plan.   Patient is now status post hemodialysis   Has ongoing hyperkalemia   Will need hemodialysis daily until improvement   Additional recommendations per nephrology   Repeat BMP in the morning    1/31  Continues to develop hyperkalemia shortly after hemodialysis  Plan is for dialysis again today as well as tomorrow  Repeat BMP in the morning    Abdominal aortic aneurysm (AAA) without rupture (HCC)- (present on admission)  Assessment & Plan  Continue outpatient follow-up   No acute worsening   Does appear at baseline it is around 5.5 cm          VTE prophylaxis:    heparin ppx      I have performed a physical exam and reviewed and updated ROS and Plan today (1/31/2025). In review of yesterday's note (1/30/2025), there are no changes except as  documented above.

## 2025-01-31 NOTE — CARE PLAN
Problem: Bronchoconstriction  Goal: Improve in air movement and diminished wheezing  Description: Target End Date:  2 to 3 days    1.  Implement inhaled treatments  2.  Evaluate and manage medication effects  Outcome: Not Progressing     Problem: Ventilation  Goal: Ability to achieve and maintain unassisted ventilation or tolerate decreased levels of ventilator support  Description: Target End Date:  4 days     Document on Vent flowsheet    1.  Support and monitor invasive and noninvasive mechanical ventilation  2.  Monitor ventilator weaning response  3.  Perform ventilator associated pneumonia prevention interventions  4.  Manage ventilation therapy by monitoring diagnostic test results  Outcome: Progressing

## 2025-01-31 NOTE — ASSESSMENT & PLAN NOTE
Patient does have a history of COPD and does require 6 L of oxygen at baseline   CT chest showed changes consistent with right lower lobe pneumonia   Patient has been started on Unasyn and azithromycin   Patient did have acute worsening and was requiring BiPAP however now with good sats on 8-10 L  Patient continues to have decreased breath sounds, this is likely chronic, no need for steroids at this time

## 2025-01-31 NOTE — THERAPY
Speech Language Pathology   Daily Treatment     Patient Name: Pb Peters  AGE:  69 y.o., SEX:  male  Medical Record #: [id number]  Date of Service: 1/31/2025      Precautions:  Precautions: Fall Risk, Swallow Precautions         Subjective  Patient cleared by RN for session. Patient received awake, pleasantly confused, eager for PO.       Assessment  Patient seen this date for dysphagia management with focus on assessing readiness for oral diet initiation vs diagnostic swallow study. Oral care provided prior to PO presentation. PO of thins by tsp/cup/straw, liquidized and dry solids assessed. Patient needing 1:1 feeding A. Adequate oral bolus acceptance/containment. Swallow initiation appeared timely. Adequate bite and mastication of dry solids. No cough/throat clear appreciated. Vocal quality remained clear. No overt s/sx of aspiration noted.       Clinical Impressions  Patient appears appropriate to initiate regular/thins diet (finger foods). Service will continue to follow, likely x1 to ensure diet tolerance and monitor for changes.        Recommendations  Treatment Completed: Dysphagia Treatment       Dysphagia Treatment  Diet Consistency: Regular solids, thin liquids - finger foods  Instrumentation: None indicated at this time  Medication: As tolerated  Supervision: Assist with meal tray set up (1:1 feeding A as needed)  Positioning: Fully upright and midline during oral intake, Meals sitting upright in a chair, as tolerated  Risk Management : Slow rate of intake, Physical mobility, as tolerated  Oral Care: Q8h                     SLP Treatment Plan  Treatment Plan: Dysphagia Treatment, Patient/Family/Caregiver Training  SLP Frequency: 3x Per Week  Estimated Duration: Until Therapy Goals Met      Anticipated Discharge Needs  Discharge Recommendations: Other (TBD pending clinical progress)  Therapy Recommendations Upon DC: Patient / Family / Caregiver Education      Patient / Family Goals  Patient /  "Family Goal #1: \"Just leave me alone\"  Goal #1 Outcome: Progressing as expected  Short Term Goals  Short Term Goal # 1: Pt will paticipate in further evaluation of the oropharyngeal swallow  Goal Outcome # 1: Goal met, new goal added  Short Term Goal # 1 B : Pt will consume rg/tn diet with no overt s/sx of aspiration or decline in pulmonary status      Claritza Salinas, SLP  "

## 2025-01-31 NOTE — ASSESSMENT & PLAN NOTE
Secondary to acute on chronic kidney disease  Persistent, will need ongoing hemodialysis  Closely monitor on telemetry  Patient is at high risk of worsening and will need close monitoring in the IMCU due to ongoing hyperkalemia  Plan is for hemodialysis shortly, no need for treatment of his hyperkalemia  Repeat BMP in the morning    1/31  Dialysis again today and tomorrow

## 2025-01-31 NOTE — ASSESSMENT & PLAN NOTE
Patient continues to be encephalopathic  Likely uremic due to acute on chronic kidney disease  Also possibly secondary to pneumonia  Continue Unasyn  Hemodialysis per nephrology  CT head showed nothing acute    1/31  Overnight associated with severe agitation  Patient was placed in restraints and Precedex drip was initiated    Patient is critically ill.   The patient continues to have : agitation  The vital organ system that is effected is the: neuro initially    If untreated there is a high chance of deterioration into: permanent brain injury  The critical care that I am providing today is: precedex gtt  The critical care that has been undertaken is medically complex.   There has been no overlap in critical care time.  Critical care time not including procedures, no overlap: 38 minutes

## 2025-01-31 NOTE — PROGRESS NOTES
[facility] -  PROGRESS NOTE               Author: Angeles Montiel D.O. Date & Time: 1/31/2025  5:49 AM     HPI:  This is a 69-year-old male with past medical history of HFpEF, COPD on 6L NC at baseline, CKD stage IV with last Scr in the office of 2.3, infrarenal AAA, blindness who lives in a skilled nursing facility in [place].  Over the last several days has been more confused with generalized weakness.  He was sent to the ER at [facility] in [place] where he was found to be tachypneic with a normal heart rate and blood pressure.  Labs showed acute on chronic kidney disease with a creatinine of 6.57 and a potassium of 7.8.  He was given hyperkalemia protocol of IV calcium, albuterol and insulin.  He became mildly hypotensive therefore was given 2 L of NS and a potassium was repeated which was 7.6.  Calcium and albuterol were repeated.  Last VBG showed a pH of 7.1, pCO2 74, PaO2 80, bicarb 27, base excess -2.  A CT of the head was performed for his altered mental status which showed no acute abnormalities, CT of his abdomen showed no obstructing stone however atrophied kidneys and stable calcified infrarenal AAA, the chest portion of his CT abdomen did show a right lower lobe infiltrate and bilateral effusions and he has been given some for the possible pneumonia.  He was started on BiPAP to treat his hypercapnia.  He was also given IV Bicarbonate for his acidosis. Goals of care were discussed by the sending physician with his family who states that the patient wanted everything done including dialysis, CPR, intubation.[place] [facility] [place]. Nephrology was consulted for THERESA/Hyperkalemia from [place] and [place] [facility] [place]. He is currently on BiPAP in the ICU. He is currently hemodynamically stable but his BP is soft with systolics in the low 90s.  Scr is 6.19 here and Bicarb is 25. Rest of BMP is pending. Patient is not able to provide  history. This information is obtained from the medical record.     DAILY NEPHROLOGY SUMMARY:  01/29 - consult done, had HD  01/30 - K improved to 5.8, SBP 100s-130s, no UOP recorded, on BiPAP, answers some questions, slightly confused, no complaints  1/31 - tolerated HD, UF 2L, SBP 80s-120s this AM, K 5.3, UOP 1.695L, off BiPAP this AM, no new c/o, feels SOB and edema improved, no n/v/d, frustrated by NPO status (awaiting swallow eval)    REVIEW OF SYSTEMS:    10 point review of systems, as above in HPI/daily summary, otherwise neg    PMH/PSH/SH/FH: Reviewed and unchanged since admission note  CURRENT MEDICATIONS: Reviewed from admission to present day    VS:  /56   Pulse (!) 57   Temp 35.8 °C (96.5 °F) (Temporal)   Resp (!) 26   Ht 1.829 m (6')   Wt 99.3 kg (218 lb 14.7 oz)   SpO2 96%   BMI 29.69 kg/m²   Physical Exam  Vitals and nursing note reviewed.   Constitutional:       General: He is not in acute distress.  HENT:      Head: Normocephalic and atraumatic.      Right Ear: External ear normal.      Left Ear: External ear normal.      Nose: Nose normal.      Mouth/Throat:      Mouth: Mucous membranes are moist.      Pharynx: Oropharynx is clear.   Eyes:      General: No scleral icterus.     Extraocular Movements: Extraocular movements intact.   Cardiovascular:      Rate and Rhythm: Regular rhythm. Bradycardia present.   Pulmonary:      Effort: No tachypnea or accessory muscle usage.      Breath sounds: No wheezing.      Comments: BiPAP  Abdominal:      General: There is no distension.      Palpations: Abdomen is soft.   Musculoskeletal:      Right lower leg: Edema (improving, wrinkling noted) present.      Left lower leg: Edema (improving, wrinkling noted) present.   Skin:     General: Skin is warm and dry.      Coloration: Skin is pale.   Neurological:      Mental Status: He is alert.      Cranial Nerves: No cranial nerve deficit.      Motor: No seizure activity.   Psychiatric:         Mood and  Affect: Mood normal.         Behavior: Behavior normal.         FLUID BALANCE:  In: 1239.6 [I.V.:141.5; Dialysis:1000]  Out: 4595     LABS:  Recent Labs     01/30/25  1038 01/30/25  1952 01/31/25  0202   SODIUM 138 137 135   POTASSIUM 6.5* 4.9 5.3   CHLORIDE 104 100 101   CO2 27 26 27   GLUCOSE 81 83 76   BUN 61* 31* 35*   CREATININE 4.35* 2.89* 2.89*   CALCIUM 8.6 8.8 8.9     Recent Labs     01/29/25  2126 01/30/25  0427 01/31/25  0202   WBC 8.8 7.3 7.5   RBC 3.33* 3.18* 3.30*   HEMOGLOBIN 10.1* 9.5* 10.1*   HEMATOCRIT 33.5* 31.4* 32.7*   .6* 98.7* 99.1*   MCH 30.3 29.9 30.6   MCHC 30.1* 30.3* 30.9*   RDW 66.5* 65.4* 64.5*   PLATELETCT 180 170 165   MPV 11.1 11.0 11.0     IMAGING:  All imaging reviewed from admission to present day     IMPRESSION:  # THERESA - etiology is unclear but suspect possible Sepsis/ATN given imaging results(PNA)/Hypoperfusion.  # Acidosis, improved  # Hyperkalemia, improved  # Hypercapnea  # HTN  # Acute on chronic resp failure  - initial concern for PNA, on abx, blood cx NGTD  # Encephalopathy, improved  # CKD Stage IV sec to  HTN and Ischemic Nephropathy  # Edema, improved  - history of challenging to control volume  # CAD  # HFpEF  # AAA <4 cm  # HLD  # Hypothyroidism  # Hypoalbuminemia  # Anemia  - ferritin 313, %sat 28  - hgb goal 10-11, currently at goal  # CKD MBD  - Ca wnl phos wnl     PLAN:  - Plan for HD 1/31 and 2/1  - Daily evaluation for RRT needs  - Dose all meds per eGFR < 15  - Avoid Nephrotoxins  - Keep MAP >/= to 65  - Monitor I/Os  - will proceed with iron replacement if Hgb decreases  - f/u PTH  - Empiric Abx per ICU team  - daily labs  - low K diet, no need for phos restrictions at this time  - low salt diet, fluid restriction to 1.5L per day once taking PO  - patient reports he was planning to initiate dialysis once necessary as outpatient, aware of declining renal function. May ultimately need HD chair assignment and permanent access prior to DC.     Further recs  to follow    Other management per primary service    Reviewed with patient and nursing staff at bedside. Please call if questions

## 2025-01-31 NOTE — CARE PLAN
The patient is Watcher - Medium risk of patient condition declining or worsening    Shift Goals  Clinical Goals: Reorient patient; pulmonary hygiene; dialysis; Q6h BMP  Patient Goals: Eat  Family Goals: DANICA - no family present    Progress made toward(s) clinical / shift goals:    Problem: Knowledge Deficit - Standard  Goal: Patient and family/care givers will demonstrate understanding of plan of care, disease process/condition, diagnostic tests and medications  Outcome: Progressing     Problem: Skin Integrity  Goal: Skin integrity is maintained or improved  Outcome: Progressing     Problem: Fall Risk  Goal: Patient will remain free from falls  Outcome: Progressing     Problem: Hemodynamics  Goal: Patient's hemodynamics, fluid balance and neurologic status will be stable or improve  Outcome: Progressing     Problem: Respiratory  Goal: Patient will achieve/maintain optimum respiratory ventilation and gas exchange  Outcome: Progressing     Problem: Pain - Standard  Goal: Alleviation of pain or a reduction in pain to the patient’s comfort goal  Outcome: Progressing       Patient is not progressing towards the following goals:

## 2025-02-01 LAB
ALBUMIN SERPL BCP-MCNC: 2.9 G/DL (ref 3.2–4.9)
ALBUMIN SERPL BCP-MCNC: 3 G/DL (ref 3.2–4.9)
ALBUMIN/GLOB SERPL: 0.9 G/DL
ALP SERPL-CCNC: 128 U/L (ref 30–99)
ALT SERPL-CCNC: 11 U/L (ref 2–50)
ANION GAP SERPL CALC-SCNC: 7 MMOL/L (ref 7–16)
ANION GAP SERPL CALC-SCNC: 9 MMOL/L (ref 7–16)
AST SERPL-CCNC: 19 U/L (ref 12–45)
BILIRUB SERPL-MCNC: 0.6 MG/DL (ref 0.1–1.5)
BUN SERPL-MCNC: 15 MG/DL (ref 8–22)
BUN SERPL-MCNC: 20 MG/DL (ref 8–22)
BUN SERPL-MCNC: 25 MG/DL (ref 8–22)
BUN SERPL-MCNC: 27 MG/DL (ref 8–22)
CALCIUM ALBUM COR SERPL-MCNC: 9.6 MG/DL (ref 8.5–10.5)
CALCIUM ALBUM COR SERPL-MCNC: 9.9 MG/DL (ref 8.5–10.5)
CALCIUM SERPL-MCNC: 8.8 MG/DL (ref 8.5–10.5)
CALCIUM SERPL-MCNC: 8.8 MG/DL (ref 8.5–10.5)
CALCIUM SERPL-MCNC: 9 MG/DL (ref 8.5–10.5)
CALCIUM SERPL-MCNC: 9.2 MG/DL (ref 8.5–10.5)
CHLORIDE SERPL-SCNC: 102 MMOL/L (ref 96–112)
CHLORIDE SERPL-SCNC: 102 MMOL/L (ref 96–112)
CHLORIDE SERPL-SCNC: 103 MMOL/L (ref 96–112)
CHLORIDE SERPL-SCNC: 103 MMOL/L (ref 96–112)
CO2 SERPL-SCNC: 25 MMOL/L (ref 20–33)
CO2 SERPL-SCNC: 25 MMOL/L (ref 20–33)
CO2 SERPL-SCNC: 26 MMOL/L (ref 20–33)
CO2 SERPL-SCNC: 28 MMOL/L (ref 20–33)
CREAT SERPL-MCNC: 1.57 MG/DL (ref 0.5–1.4)
CREAT SERPL-MCNC: 1.95 MG/DL (ref 0.5–1.4)
CREAT SERPL-MCNC: 2.32 MG/DL (ref 0.5–1.4)
CREAT SERPL-MCNC: 2.36 MG/DL (ref 0.5–1.4)
ERYTHROCYTE [DISTWIDTH] IN BLOOD BY AUTOMATED COUNT: 62 FL (ref 35.9–50)
GFR SERPLBLD CREATININE-BSD FMLA CKD-EPI: 29 ML/MIN/1.73 M 2
GFR SERPLBLD CREATININE-BSD FMLA CKD-EPI: 30 ML/MIN/1.73 M 2
GFR SERPLBLD CREATININE-BSD FMLA CKD-EPI: 36 ML/MIN/1.73 M 2
GFR SERPLBLD CREATININE-BSD FMLA CKD-EPI: 47 ML/MIN/1.73 M 2
GLOBULIN SER CALC-MCNC: 3.2 G/DL (ref 1.9–3.5)
GLUCOSE BLD STRIP.AUTO-MCNC: 120 MG/DL (ref 65–99)
GLUCOSE SERPL-MCNC: 106 MG/DL (ref 65–99)
GLUCOSE SERPL-MCNC: 135 MG/DL (ref 65–99)
GLUCOSE SERPL-MCNC: 92 MG/DL (ref 65–99)
GLUCOSE SERPL-MCNC: 94 MG/DL (ref 65–99)
HCT VFR BLD AUTO: 34.1 % (ref 42–52)
HGB BLD-MCNC: 10.6 G/DL (ref 14–18)
MCH RBC QN AUTO: 30.1 PG (ref 27–33)
MCHC RBC AUTO-ENTMCNC: 31.1 G/DL (ref 32.3–36.5)
MCV RBC AUTO: 96.9 FL (ref 81.4–97.8)
PHOSPHATE SERPL-MCNC: 3.1 MG/DL (ref 2.5–4.5)
PLATELET # BLD AUTO: 167 K/UL (ref 164–446)
PMV BLD AUTO: 10.8 FL (ref 9–12.9)
POTASSIUM SERPL-SCNC: 3.9 MMOL/L (ref 3.6–5.5)
POTASSIUM SERPL-SCNC: 4.3 MMOL/L (ref 3.6–5.5)
POTASSIUM SERPL-SCNC: 4.3 MMOL/L (ref 3.6–5.5)
POTASSIUM SERPL-SCNC: 4.4 MMOL/L (ref 3.6–5.5)
PROCALCITONIN SERPL-MCNC: 0.13 NG/ML
PROT SERPL-MCNC: 6.2 G/DL (ref 6–8.2)
PTH-INTACT SERPL-MCNC: 187 PG/ML (ref 14–72)
RBC # BLD AUTO: 3.52 M/UL (ref 4.7–6.1)
SODIUM SERPL-SCNC: 137 MMOL/L (ref 135–145)
WBC # BLD AUTO: 6.9 K/UL (ref 4.8–10.8)

## 2025-02-01 PROCEDURE — 90935 HEMODIALYSIS ONE EVALUATION: CPT

## 2025-02-01 PROCEDURE — 94640 AIRWAY INHALATION TREATMENT: CPT

## 2025-02-01 PROCEDURE — 99233 SBSQ HOSP IP/OBS HIGH 50: CPT | Performed by: INTERNAL MEDICINE

## 2025-02-01 PROCEDURE — 82962 GLUCOSE BLOOD TEST: CPT

## 2025-02-01 PROCEDURE — 700102 HCHG RX REV CODE 250 W/ 637 OVERRIDE(OP): Performed by: INTERNAL MEDICINE

## 2025-02-01 PROCEDURE — 80053 COMPREHEN METABOLIC PANEL: CPT

## 2025-02-01 PROCEDURE — 700101 HCHG RX REV CODE 250: Performed by: INTERNAL MEDICINE

## 2025-02-01 PROCEDURE — 83970 ASSAY OF PARATHORMONE: CPT

## 2025-02-01 PROCEDURE — 80069 RENAL FUNCTION PANEL: CPT

## 2025-02-01 PROCEDURE — 36415 COLL VENOUS BLD VENIPUNCTURE: CPT

## 2025-02-01 PROCEDURE — A9270 NON-COVERED ITEM OR SERVICE: HCPCS | Performed by: INTERNAL MEDICINE

## 2025-02-01 PROCEDURE — 80048 BASIC METABOLIC PNL TOTAL CA: CPT | Mod: 91

## 2025-02-01 PROCEDURE — 85027 COMPLETE CBC AUTOMATED: CPT

## 2025-02-01 PROCEDURE — 86707 HEPATITIS BE ANTIBODY: CPT

## 2025-02-01 PROCEDURE — A9270 NON-COVERED ITEM OR SERVICE: HCPCS | Performed by: EMERGENCY MEDICINE

## 2025-02-01 PROCEDURE — 770020 HCHG ROOM/CARE - TELE (206)

## 2025-02-01 PROCEDURE — 84145 PROCALCITONIN (PCT): CPT

## 2025-02-01 PROCEDURE — 700111 HCHG RX REV CODE 636 W/ 250 OVERRIDE (IP): Mod: JZ | Performed by: INTERNAL MEDICINE

## 2025-02-01 PROCEDURE — 700111 HCHG RX REV CODE 636 W/ 250 OVERRIDE (IP): Performed by: INTERNAL MEDICINE

## 2025-02-01 PROCEDURE — 700102 HCHG RX REV CODE 250 W/ 637 OVERRIDE(OP): Performed by: EMERGENCY MEDICINE

## 2025-02-01 PROCEDURE — 700105 HCHG RX REV CODE 258: Performed by: INTERNAL MEDICINE

## 2025-02-01 RX ORDER — AMLODIPINE BESYLATE 10 MG/1
10 TABLET ORAL DAILY
Status: DISCONTINUED | OUTPATIENT
Start: 2025-02-01 | End: 2025-02-12 | Stop reason: HOSPADM

## 2025-02-01 RX ORDER — HYDRALAZINE HYDROCHLORIDE 20 MG/ML
10 INJECTION INTRAMUSCULAR; INTRAVENOUS EVERY 4 HOURS PRN
Status: DISCONTINUED | OUTPATIENT
Start: 2025-02-01 | End: 2025-02-04

## 2025-02-01 RX ORDER — PRAMIPEXOLE DIHYDROCHLORIDE 0.5 MG/1
0.5 TABLET ORAL DAILY
Status: DISCONTINUED | OUTPATIENT
Start: 2025-02-01 | End: 2025-02-12 | Stop reason: HOSPADM

## 2025-02-01 RX ORDER — GABAPENTIN 300 MG/1
300 CAPSULE ORAL DAILY
Status: DISCONTINUED | OUTPATIENT
Start: 2025-02-01 | End: 2025-02-05

## 2025-02-01 RX ADMIN — HEPARIN SODIUM 5000 UNITS: 5000 INJECTION, SOLUTION INTRAVENOUS; SUBCUTANEOUS at 22:35

## 2025-02-01 RX ADMIN — HEPARIN SODIUM 5000 UNITS: 5000 INJECTION, SOLUTION INTRAVENOUS; SUBCUTANEOUS at 14:32

## 2025-02-01 RX ADMIN — IPRATROPIUM BROMIDE AND ALBUTEROL SULFATE 3 ML: .5; 2.5 SOLUTION RESPIRATORY (INHALATION) at 18:57

## 2025-02-01 RX ADMIN — IPRATROPIUM BROMIDE AND ALBUTEROL SULFATE 3 ML: .5; 2.5 SOLUTION RESPIRATORY (INHALATION) at 06:29

## 2025-02-01 RX ADMIN — AMLODIPINE BESYLATE 10 MG: 10 TABLET ORAL at 18:15

## 2025-02-01 RX ADMIN — HEPARIN SODIUM 1200 UNITS: 1000 INJECTION, SOLUTION INTRAVENOUS; SUBCUTANEOUS at 13:26

## 2025-02-01 RX ADMIN — SENNOSIDES AND DOCUSATE SODIUM 2 TABLET: 50; 8.6 TABLET ORAL at 16:47

## 2025-02-01 RX ADMIN — HEPARIN SODIUM 5000 UNITS: 5000 INJECTION, SOLUTION INTRAVENOUS; SUBCUTANEOUS at 05:36

## 2025-02-01 RX ADMIN — AMPICILLIN AND SULBACTAM 3 G: 1; 2 INJECTION, POWDER, FOR SOLUTION INTRAMUSCULAR; INTRAVENOUS at 05:41

## 2025-02-01 RX ADMIN — CLOPIDOGREL BISULFATE 75 MG: 75 TABLET, FILM COATED ORAL at 05:36

## 2025-02-01 RX ADMIN — GABAPENTIN 300 MG: 300 CAPSULE ORAL at 13:43

## 2025-02-01 RX ADMIN — ATORVASTATIN CALCIUM 40 MG: 40 TABLET, FILM COATED ORAL at 16:47

## 2025-02-01 RX ADMIN — LEVOTHYROXINE SODIUM 112 MCG: 0.11 TABLET ORAL at 05:36

## 2025-02-01 ASSESSMENT — PAIN DESCRIPTION - PAIN TYPE
TYPE: ACUTE PAIN

## 2025-02-01 ASSESSMENT — FIBROSIS 4 INDEX: FIB4 SCORE: 2.61

## 2025-02-01 NOTE — PROGRESS NOTES
Hospital Medicine Daily Progress Note    Date of Service  2/1/2025    Chief Complaint  Pb Peters is a 69 y.o. male admitted 1/29/2025 with acute hypercapnic respiratory failure, acute on chronic kidney injury and hyperkalemia.    Hospital Course  69-year-old male with a history of COPD requiring 6 L nasal cannula baseline, chronic kidney disease stage IV, infrarenal abdominal aortic aneurysm presented from [facility] due to acute hypercapnic respiratory failure, acute on chronic kidney disease causing hyperkalemia.  Labs there showed a creatinine of 6.57 with potassium of 7.8.  Patient was treated for his hyperkalemia and transferred here for need for urgent hemodialysis.  Patient was also noted to have a pH of 7.1 with a pCO2 of 74 on VBG.  CT scan of his abdomen showed a stable infrarenal abdominal aortic aneurysm, chest portion did show right lower lobe infiltrate and bilateral effusions.    Interval Problem Update  Patient has had improvement in his respiratory status, he is satting well on 8 to 10 L, baseline is 6.  Patient remains confused, I am unsure what his baseline is, he resides in a skilled nursing facility.  Patient continues to have hemodialysis, will have it again today as well as tomorrow.  Patient does have mild hyperkalemia intermittently  Multiple days of hemodialysis, no plans for hemodialysis on Sunday    Overnight  No issues  Agitation resolved    I have discussed this patient's plan of care and discharge plan at IDT rounds today with Case Management, Nursing, Nursing leadership, and other members of the IDT team.    Consultants/Specialty  nephrology    Code Status  Full Code    Disposition  The patient is not medically cleared for discharge to home or a post-acute facility.  Anticipate discharge to: skilled nursing facility    I have placed the appropriate orders for post-discharge needs.    Review of Systems  Review of Systems   Unable to perform ROS:  Mental acuity        Physical Exam  Temp:  [37 °C (98.6 °F)] 37 °C (98.6 °F)  Pulse:  [63-82] 66  Resp:  [14-40] 17  BP: (128-174)/(60-95) 145/65  SpO2:  [93 %-97 %] 95 %    Physical Exam  Vitals and nursing note reviewed.   Constitutional:       General: He is not in acute distress.     Appearance: He is well-developed. He is ill-appearing. He is not diaphoretic.   HENT:      Head: Normocephalic and atraumatic.      Right Ear: External ear normal.      Left Ear: External ear normal.      Nose: No congestion or rhinorrhea.      Mouth/Throat:      Mouth: Mucous membranes are moist.      Pharynx: No oropharyngeal exudate.   Eyes:      General:         Right eye: No discharge.         Left eye: No discharge.      Conjunctiva/sclera: Conjunctivae normal.   Neck:      Trachea: No tracheal deviation.   Cardiovascular:      Rate and Rhythm: Normal rate and regular rhythm.      Heart sounds: Murmur heard.      No friction rub. No gallop.   Pulmonary:      Effort: Pulmonary effort is normal. No respiratory distress.      Breath sounds: Rales present. No wheezing.   Abdominal:      General: Abdomen is flat. Bowel sounds are normal. There is no distension.      Palpations: Abdomen is soft.   Musculoskeletal:      Cervical back: Neck supple. No edema or erythema.      Right lower leg: Edema present.      Left lower leg: Edema present.   Lymphadenopathy:      Cervical: No cervical adenopathy.   Skin:     General: Skin is warm and dry.      Findings: No erythema or rash.   Neurological:      Mental Status: He is alert.      Comments: Awake but confused   Psychiatric:         Speech: Speech is delayed.         Behavior: Behavior is slowed.         Cognition and Memory: Cognition is impaired. Memory is impaired.         Fluids    Intake/Output Summary (Last 24 hours) at 2/1/2025 1301  Last data filed at 2/1/2025 0800  Gross per 24 hour   Intake 1337.49 ml   Output 3400 ml   Net -2062.51 ml        Laboratory  Recent Labs      01/30/25  0427 01/31/25  0202 02/01/25  0255   WBC 7.3 7.5 6.9   RBC 3.18* 3.30* 3.52*   HEMOGLOBIN 9.5* 10.1* 10.6*   HEMATOCRIT 31.4* 32.7* 34.1*   MCV 98.7* 99.1* 96.9   MCH 29.9 30.6 30.1   MCHC 30.3* 30.9* 31.1*   RDW 65.4* 64.5* 62.0*   PLATELETCT 170 165 167   MPV 11.0 11.0 10.8     Recent Labs     01/31/25  2040 02/01/25  0255 02/01/25  0808   SODIUM 136 137 137   POTASSIUM 4.4 4.3 4.3   CHLORIDE 100 103 102   CO2 27 25 28   GLUCOSE 102* 92 94   BUN 24* 25* 27*   CREATININE 2.24* 2.32* 2.36*   CALCIUM 8.8 9.0 8.8                   Imaging  DX-CHEST-PORTABLE (1 VIEW)   Final Result      Right IJ catheter terminates in the region of the SVC. No pneumothorax.      Bibasilar opacities.           Assessment/Plan  * Hyperkalemia- (present on admission)  Assessment & Plan  Secondary to acute on chronic kidney disease  Persistent, will need ongoing hemodialysis  Closely monitor on telemetry  Patient is at high risk of worsening and will need close monitoring in the IMCU due to ongoing hyperkalemia  Plan is for hemodialysis shortly, no need for treatment of his hyperkalemia  Repeat BMP in the morning  Now resolved with multiple days of hemodialysis  Repeat BMP in the morning    Bacterial pneumonia- (present on admission)  Assessment & Plan  Was a concern for bacterial pneumonia so patient was placed on antibiotics  Continue Unasyn  Normal procalcitonin  Unasyn will finish tomorrow    Shock (HCC)  Assessment & Plan  Did have short episode of shock that did require Levophed however this has been off for over 24 hours and now blood pressure has increased  Restarting home antihypertensive    Toxic metabolic encephalopathy  Assessment & Plan  Patient continues to be encephalopathic  Likely uremic due to acute on chronic kidney disease  Also possibly secondary to pneumonia  Continue Unasyn  Hemodialysis per nephrology  CT head showed nothing acute    1/31  Overnight associated with severe agitation  Patient was placed in  restraints and Precedex drip was initiated    2/1  Agitation has improved yet patient remains confused  I am unsure what his baseline is  No need for Precedex drip or restraints    Chronic hypoxemic respiratory failure (HCC)  Assessment & Plan  Patient does have a history of COPD and does require 6 L of oxygen at baseline   CT chest showed changes consistent with right lower lobe pneumonia   Patient has been started on Unasyn and azithromycin   Patient did have acute worsening and was requiring BiPAP however now with good sats on 8-10 L  Patient continues to have decreased breath sounds, this is likely chronic, no need for steroids at this time    Acute on chronic renal failure (HCC)  Assessment & Plan  Acute kidney injury on top on chronic kidney disease   Associated with severe hyperkalemia   Because of this, nephrology was consulted,   It was deemed he will need long-term hemodialysis, acutely secondary to hyperkalemia   Family was contacted and agreed with treatment plan.   Patient is now status post hemodialysis   Has ongoing hyperkalemia   Will need hemodialysis daily until improvement   Additional recommendations per nephrology   Repeat BMP in the morning    1/31  Continues to develop hyperkalemia shortly after hemodialysis  Plan is for dialysis again today as well as tomorrow  Repeat BMP in the morning    2/1  Hemodialysis again today however no plans for tomorrow  Closely monitor on telemetry, high risk of developing hyperkalemia again  Repeat BMP in the morning    Hypertension, essential- (present on admission)  Assessment & Plan  Initially, blood pressure was low  Has now markedly increased  Restart home amlodipine  Start as needed hydralazine  Adjust as needed    Abdominal aortic aneurysm (AAA) without rupture (HCC)- (present on admission)  Assessment & Plan  Continue outpatient follow-up   No acute worsening   Does appear at baseline it is around 5.5 cm          VTE prophylaxis:    heparin ppx      I  have performed a physical exam and reviewed and updated ROS and Plan today (2/1/2025). In review of yesterday's note (1/31/2025), there are no changes except as documented above.

## 2025-02-01 NOTE — PROGRESS NOTES
[facility] Services Progress Note     Hemodialysis treatment  x 3 hours as ordered per Dr. Markham .  Treatment initiated at 1025 and ended at 1325 .      Pt alert and eating after tx. Pt without distress no new complaints.  See e-flow sheets for further details.     Net UF : 2000 mL     Post tx CVC care: Right IJ non tunneled HD ports cleansed per protocol, flushed with NS, locked with Heparin 1ml/1000 unit, clamped and capped. CVC dressing assessed, CDI. Aspirate Heparin prior to next CVC use.     Report given to primary RN. Kaylin

## 2025-02-01 NOTE — RESPIRATORY CARE
"    [place] EMERGENCY DEPARTMENT ([facility])  COPD EDUCATION by COPD CLINICAL EDUCATOR  2/1/2025 at 9:46 AM by Carol Martinez, RRT     Patient interviewed by COPD education team. Patient refused COPD program at this time. An Action Plan was updated in the EMR to reflect current Respiratory Medication use.                 Patient has dementia and is blind lives at [facility] in [place]. Unable to follow directions. I have requested from the Team that Duo neb replace Anoro at the SNF where patient resides.    COPD Screen  COPD Risk Screening  Do you have a history of COPD?: Yes    COPD Assessment  COPD Clinical Specialists ONLY  COPD Education Initiated: Yes--Short Intervention (pt who is blind and with dementia, lives at [facility] in [place], will request Duoneb QID instead of anoro, Action plan updated,)  Is this a COPD exacerbation patient?: No  DME Company: [facility]  DME Equipment Type: honme O2  Physician Name: CAROLINA Simpson  Pulmonologist Name: none prior  Palliative Care: Yes  Durable Power of : No  Advance Directive: No  Discussion with others: No  Is POLST on file?: No  Referrals Initiated: Yes  Pulmonary Rehab: No  Smoking Cessation: No  Hospice: No  Home Health Care: No  Mobile Urgent Care Services: No  Geriatric Specialty Group: No  Private In-Home Care Agency: No  (OP) Pulmonary Function Testing:  (none per chart)  Interdisciplinary Rounds: Attendance at Rounds (30 Min)    PFT Results    No results found for: \"PFT\"    Meds to Beds        MY COPD ACTION PLAN     It is recommended that patients and physicians/healthcare providers complete this action plan together. This plan should be discussed at each physician visit and updated as needed.    The green, yellow and red zones show groups of symptoms of COPD. This list of symptoms is not comprehensive, and you may experience other symptoms. In the \"Actions\" column, your healthcare provider has recommended actions for you to take based on your symptoms.    Patient Name: Pb Peters   Date of " "Birth: 1955   Last Updated on: 2/1/2025  9:45 AM   Green Zone:  I am doing well today Actions     Usual activitiy and exercise level   Take daily medications     Usual amounts of cough and phlegm/mucus   Use oxygen as prescribed     Sleep well at night   Continue regular exercise/diet plan     Appetite is good   At all times avoid cigarette smoke, inhaled irritants     Daily Medications (these medications are taken every day):   Albuterol/Ipratropium (Combivent, Duoneb) 3mL via nebulizer Twice daily        Yellow Zone:  I am having a bad day or a COPD flare Actions     More breathless than usual   Continue daily medications     I have less energy for my daily activities   Use quick relief inhaler as ordered     Increased or thicker phlegm/mucus   Use oxygen as prescribed     Using quick relief inhaler/nebulizer more often   Get plenty of rest     Swelling of ankles more than usual   Use pursed lip breathing     More coughing than usual   At all times avoid cigarette smoke, inhaled irritants     I feel like I have a \"chest cold\"     Poor sleep and my symptoms woke me up     My appetite is not good     My medicine is not helping      Call provider immediately if symptoms don’t improve     Continue daily medications, add rescue medications:   Albuterol/Ipratropium (Combivent, Duoneb) 3mL via nebulizer Every 4 hours PRN       Medications to be used during a flare up, (as Discussed with Provider):              Red Zone:  I need urgent medical care Actions     Severe shortness of breath even at rest   Call 911 or seek medical care immediately     Not able to do any activity because of breathing      Fever or shaking chills      Feeling confused or very drowsy       Chest pains      Coughing up blood                  " "Dieter Hendricks MD;  Location:  GI      Janae iSlva MD;  Location: Dukes Memorial [facility])    Thaddeus Holder.    7/23/2020   [facility], [place], EMERGENCY DEPARTMENT     Sree Ayoub MD  07/23/20 1418

## 2025-02-01 NOTE — ASSESSMENT & PLAN NOTE
Was a concern for bacterial pneumonia so patient was placed on antibiotics  Continue Unasyn  Obtain procalcitonin to assist with antibiotic cessation

## 2025-02-01 NOTE — CARE PLAN
Problem: Bronchoconstriction  Goal: Improve in air movement and diminished wheezing  Description: Target End Date:  2 to 3 days    1.  Implement inhaled treatments  2.  Evaluate and manage medication effects  Outcome: Progressing     8L oxymask

## 2025-02-01 NOTE — PROGRESS NOTES
[facility] Progress Note     HD treatment ordered by Dr Montiel x 3 hours.  Treatment Start time: 1402          End time: 1702       Net UF - 2000 ml    Patient tolerated treatment well. VSS/elevated (130-180's)    All blood was returned. CVC RIJ locked with heparin (Red limb-1.2 ml; Blue limb-1.2 ml). End cap changed.  See flow sheet for details.     Report given to TEE Torre RN.

## 2025-02-01 NOTE — CARE PLAN
The patient is Watcher - Medium risk of patient condition declining or worsening    Shift Goals  Clinical Goals: REorient pt, pulmonary hygeine, rest, Q6hr bmp  Patient Goals: rest  Family Goals: silvia    Progress made toward(s) clinical / shift goals:    Problem: Knowledge Deficit - Standard  Goal: Patient and family/care givers will demonstrate understanding of plan of care, disease process/condition, diagnostic tests and medications  Outcome: Progressing     Problem: Skin Integrity  Goal: Skin integrity is maintained or improved  Outcome: Progressing     Problem: Fall Risk  Goal: Patient will remain free from falls  Outcome: Progressing     Problem: Hemodynamics  Goal: Patient's hemodynamics, fluid balance and neurologic status will be stable or improve  Outcome: Progressing     Problem: Respiratory  Goal: Patient will achieve/maintain optimum respiratory ventilation and gas exchange  Outcome: Progressing     Problem: Pain - Standard  Goal: Alleviation of pain or a reduction in pain to the patient’s comfort goal  Outcome: Progressing       Patient is not progressing towards the following goals:

## 2025-02-01 NOTE — WOUND TEAM
[facility]  Inpatient Services  Wound and Skin Care Brief Evaluation    Admission Date: 1/29/2025     Last order of IP CONSULT TO WOUND CARE was found on 1/29/2025 from Hospital Encounter on 1/29/2025     HPI, PMH, SH: Reviewed    No chief complaint on file.    Diagnosis: Hyperkalemia [E87.5]    Unit where seen by Wound Team: T915/01     Wound consult placed regarding elbows, upper arms, bilateral heels, buttocks. Chart and images reviewed. This discussed with bedside HERMANN Vasques. This clinician in to assess patient. Patient pleasant and agreeable. skin. Non-selectively debrided with Moist warm washcloth.                               No pressure injuries or advanced wound care needs identified. Wound consult completed. No further follow up unless indicated and consulted.          PREVENTATIVE INTERVENTIONS:    Q shift Mauricio - performed per nursing policy  Q shift pressure point assessments - performed per nursing policy    Surface/Positioning  ICU Low Airloss - Currently in Place  Reposition q 2 hours - Currently in Place  TAPs Turning system - Currently in Place    Offloading/Redistribution  Heel offloading dressing (Silicone dressing) - Currently in Place      Respiratory  Silicone O2 tubing - Currently in Place  Gray Foam Ear protectors - Currently in Place    Containment/Moisture Prevention    Reina Catheter - Currently in Place

## 2025-02-01 NOTE — PROGRESS NOTES
[facility] -  PROGRESS NOTE               Author: Zeny Markham M.D. Date & Time: 2/1/2025  9:40 AM     HPI:  This is a 69-year-old male with past medical history of HFpEF, COPD on 6L NC at baseline, CKD stage IV with last Scr in the office of 2.3, infrarenal AAA, blindness who lives in a skilled nursing facility in [place].  Over the last several days has been more confused with generalized weakness.  He was sent to the ER at [facility] in [place] where he was found to be tachypneic with a normal heart rate and blood pressure.  Labs showed acute on chronic kidney disease with a creatinine of 6.57 and a potassium of 7.8.  He was given hyperkalemia protocol of IV calcium, albuterol and insulin.  He became mildly hypotensive therefore was given 2 L of NS and a potassium was repeated which was 7.6.  Calcium and albuterol were repeated.  Last VBG showed a pH of 7.1, pCO2 74, PaO2 80, bicarb 27, base excess -2.  A CT of the head was performed for his altered mental status which showed no acute abnormalities, CT of his abdomen showed no obstructing stone however atrophied kidneys and stable calcified infrarenal AAA, the chest portion of his CT abdomen did show a right lower lobe infiltrate and bilateral effusions and he has been given some for the possible pneumonia.  He was started on BiPAP to treat his hypercapnia.  He was also given IV Bicarbonate for his acidosis. Goals of care were discussed by the sending physician with his family who states that the patient wanted everything done including dialysis, CPR, intubation.He was flighted to [facility] for advanced care. Nephrology was consulted for THERESA/Hyperkalemia from [place] and notified by [facility] on arrival. He is currently on BiPAP in the ICU. He is currently hemodynamically stable but his BP is soft with systolics in the low 90s.  Scr is 6.19 here and Bicarb is 25. Rest of BMP is pending. Patient is not able to provide  history. This information is obtained from the medical record.      DAILY NEPHROLOGY SUMMARY:  01/29 - consult done, had HD  01/30 - K improved to 5.8, SBP 100s-130s, no UOP recorded, on BiPAP, answers some questions, slightly confused, no complaints  1/31 - tolerated HD, UF 2L, SBP 80s-120s this AM, K 5.3, UOP 1.695L, off BiPAP this AM, no new c/o, feels SOB and edema improved, no n/v/d, frustrated by NPO status (awaiting swallow eval)   2/1: Seen this Am sitting up In bed eating pancakes for breakfast. Doing well without complaints awaiting for HD this AM.     REVIEW OF SYSTEMS:    10 point ROS reviewed and is as per HPI/daily summary or otherwise negative    PMH/PSH/SH/FH: Reviewed and unchanged since admission note  CURRENT MEDICATIONS: Reviewed from admission to present day    VS:  BP (!) 169/83   Pulse 73   Temp 35.8 °C (96.5 °F) (Temporal)   Resp 17   Ht 1.829 m (6')   Wt 98.2 kg (216 lb 7.9 oz)   SpO2 95%   BMI 29.36 kg/m²   Physical Exam  Constitutional:       General: He is not in acute distress.  HENT:      Head: Normocephalic and atraumatic.      Right Ear: External ear normal.      Left Ear: External ear normal.      Nose: Nose normal.      Mouth/Throat:      Mouth: Mucous membranes are moist.      Pharynx: Oropharynx is clear.   Eyes:      General: No scleral icterus.     Extraocular Movements: Extraocular movements intact.   Cardiovascular:      Rate and Rhythm: Regular rhythm. Bradycardia present.   Pulmonary:      Effort: No tachypnea or accessory muscle usage.      Breath sounds: No wheezing.      Comments: BiPAP  Abdominal:      General: There is no distension.      Palpations: Abdomen is soft.   Musculoskeletal:      Right lower leg: Edema (improving, wrinkling noted) present.      Left lower leg: Edema (improving, wrinkling noted) present.   Skin:     General: Skin is warm and dry.      Coloration: Skin is pale.   Neurological:      Mental Status: He is alert.      Cranial Nerves: No  cranial nerve deficit.      Motor: No seizure activity.   Psychiatric:         Mood and Affect: Mood normal.         Behavior: Behavior normal.   FLUID BALANCE:  In: 1337.5 [P.O.:740; Dialysis:500]  Out: 4470     LABS:  Recent Labs     01/31/25  2040 02/01/25  0255 02/01/25  0808   SODIUM 136 137 137   POTASSIUM 4.4 4.3 4.3   CHLORIDE 100 103 102   CO2 27 25 28   GLUCOSE 102* 92 94   BUN 24* 25* 27*   CREATININE 2.24* 2.32* 2.36*   CALCIUM 8.8 9.0 8.8     Recent Labs     01/30/25  0427 01/31/25  0202 02/01/25  0255   WBC 7.3 7.5 6.9   RBC 3.18* 3.30* 3.52*   HEMOGLOBIN 9.5* 10.1* 10.6*   HEMATOCRIT 31.4* 32.7* 34.1*   MCV 98.7* 99.1* 96.9   MCH 29.9 30.6 30.1   MCHC 30.3* 30.9* 31.1*   RDW 65.4* 64.5* 62.0*   PLATELETCT 170 165 167   MPV 11.0 11.0 10.8       IMPRESSION:    # THERESA - etiology is unclear but suspect possible Sepsis/ATN given imaging results(PNA)/Hypoperfusion.  # Acidosis, improved  # Hyperkalemia, improved  # Hypercapnea  # HTN  # Acute on chronic resp failure  - initial concern for PNA, on abx, blood cx NGTD  # Encephalopathy, improved  # CKD Stage IV sec to  HTN and Ischemic Nephropathy  # Edema, improved  - history of challenging to control volume  # CAD  # HFpEF  # AAA <4 cm  # HLD  # Hypothyroidism  # Hypoalbuminemia  # Anemia  - ferritin 313, %sat 28  - hgb goal 10-11, currently at goal  # CKD MBD  - Ca wnl phos wnl     PLAN:  - Plan for HD 1/31 and 2/1  - Daily evaluation for RRT needs  - Dose all meds per eGFR < 15  - Avoid Nephrotoxins  - Keep MAP >/= to 65  - Monitor I/Os  - f/u PTH  - Empiric Abx per ICU team  - daily labs  - low K diet, no need for phos restrictions at this time  - low salt diet, fluid restriction to 1.5L per day once taking PO  - patient reports he was planning to initiate dialysis once necessary as outpatient, aware of declining renal function. May ultimately need HD chair assignment and permanent access prior to DC.      Further recs to follow     Other management per  primary service

## 2025-02-02 LAB
ALBUMIN SERPL BCP-MCNC: 3 G/DL (ref 3.2–4.9)
ALBUMIN/GLOB SERPL: 1.3 G/DL
ALP SERPL-CCNC: 132 U/L (ref 30–99)
ALT SERPL-CCNC: 9 U/L (ref 2–50)
ANION GAP SERPL CALC-SCNC: 11 MMOL/L (ref 7–16)
ANION GAP SERPL CALC-SCNC: 5 MMOL/L (ref 7–16)
AST SERPL-CCNC: 11 U/L (ref 12–45)
BASOPHILS # BLD AUTO: 0.4 % (ref 0–1.8)
BASOPHILS # BLD: 0.03 K/UL (ref 0–0.12)
BILIRUB SERPL-MCNC: 0.4 MG/DL (ref 0.1–1.5)
BUN SERPL-MCNC: 21 MG/DL (ref 8–22)
BUN SERPL-MCNC: 22 MG/DL (ref 8–22)
CALCIUM ALBUM COR SERPL-MCNC: 9.3 MG/DL (ref 8.5–10.5)
CALCIUM SERPL-MCNC: 8.5 MG/DL (ref 8.5–10.5)
CALCIUM SERPL-MCNC: 8.8 MG/DL (ref 8.5–10.5)
CHLORIDE SERPL-SCNC: 104 MMOL/L (ref 96–112)
CHLORIDE SERPL-SCNC: 104 MMOL/L (ref 96–112)
CO2 SERPL-SCNC: 25 MMOL/L (ref 20–33)
CO2 SERPL-SCNC: 27 MMOL/L (ref 20–33)
CREAT SERPL-MCNC: 2.11 MG/DL (ref 0.5–1.4)
CREAT SERPL-MCNC: 2.41 MG/DL (ref 0.5–1.4)
EOSINOPHIL # BLD AUTO: 0.23 K/UL (ref 0–0.51)
EOSINOPHIL NFR BLD: 3 % (ref 0–6.9)
ERYTHROCYTE [DISTWIDTH] IN BLOOD BY AUTOMATED COUNT: 64.5 FL (ref 35.9–50)
GFR SERPLBLD CREATININE-BSD FMLA CKD-EPI: 28 ML/MIN/1.73 M 2
GFR SERPLBLD CREATININE-BSD FMLA CKD-EPI: 33 ML/MIN/1.73 M 2
GLOBULIN SER CALC-MCNC: 2.3 G/DL (ref 1.9–3.5)
GLUCOSE SERPL-MCNC: 91 MG/DL (ref 65–99)
GLUCOSE SERPL-MCNC: 99 MG/DL (ref 65–99)
HCT VFR BLD AUTO: 32.5 % (ref 42–52)
HGB BLD-MCNC: 10.1 G/DL (ref 14–18)
IMM GRANULOCYTES # BLD AUTO: 0.11 K/UL (ref 0–0.11)
IMM GRANULOCYTES NFR BLD AUTO: 1.4 % (ref 0–0.9)
LYMPHOCYTES # BLD AUTO: 1.05 K/UL (ref 1–4.8)
LYMPHOCYTES NFR BLD: 13.8 % (ref 22–41)
MCH RBC QN AUTO: 30.8 PG (ref 27–33)
MCHC RBC AUTO-ENTMCNC: 31.1 G/DL (ref 32.3–36.5)
MCV RBC AUTO: 99.1 FL (ref 81.4–97.8)
MONOCYTES # BLD AUTO: 1.36 K/UL (ref 0–0.85)
MONOCYTES NFR BLD AUTO: 17.8 % (ref 0–13.4)
NEUTROPHILS # BLD AUTO: 4.85 K/UL (ref 1.82–7.42)
NEUTROPHILS NFR BLD: 63.6 % (ref 44–72)
NRBC # BLD AUTO: 0 K/UL
NRBC BLD-RTO: 0 /100 WBC (ref 0–0.2)
PLATELET # BLD AUTO: 146 K/UL (ref 164–446)
PMV BLD AUTO: 11.2 FL (ref 9–12.9)
POTASSIUM SERPL-SCNC: 4.2 MMOL/L (ref 3.6–5.5)
POTASSIUM SERPL-SCNC: 4.3 MMOL/L (ref 3.6–5.5)
PROT SERPL-MCNC: 5.3 G/DL (ref 6–8.2)
RBC # BLD AUTO: 3.28 M/UL (ref 4.7–6.1)
SODIUM SERPL-SCNC: 136 MMOL/L (ref 135–145)
SODIUM SERPL-SCNC: 140 MMOL/L (ref 135–145)
WBC # BLD AUTO: 7.6 K/UL (ref 4.8–10.8)

## 2025-02-02 PROCEDURE — 700102 HCHG RX REV CODE 250 W/ 637 OVERRIDE(OP): Performed by: INTERNAL MEDICINE

## 2025-02-02 PROCEDURE — A9270 NON-COVERED ITEM OR SERVICE: HCPCS | Performed by: INTERNAL MEDICINE

## 2025-02-02 PROCEDURE — 700102 HCHG RX REV CODE 250 W/ 637 OVERRIDE(OP): Performed by: EMERGENCY MEDICINE

## 2025-02-02 PROCEDURE — A9270 NON-COVERED ITEM OR SERVICE: HCPCS | Performed by: EMERGENCY MEDICINE

## 2025-02-02 PROCEDURE — 700111 HCHG RX REV CODE 636 W/ 250 OVERRIDE (IP): Performed by: INTERNAL MEDICINE

## 2025-02-02 PROCEDURE — 80053 COMPREHEN METABOLIC PANEL: CPT

## 2025-02-02 PROCEDURE — 700105 HCHG RX REV CODE 258: Performed by: INTERNAL MEDICINE

## 2025-02-02 PROCEDURE — 94640 AIRWAY INHALATION TREATMENT: CPT

## 2025-02-02 PROCEDURE — 99232 SBSQ HOSP IP/OBS MODERATE 35: CPT | Performed by: STUDENT IN AN ORGANIZED HEALTH CARE EDUCATION/TRAINING PROGRAM

## 2025-02-02 PROCEDURE — 700111 HCHG RX REV CODE 636 W/ 250 OVERRIDE (IP): Mod: JZ | Performed by: INTERNAL MEDICINE

## 2025-02-02 PROCEDURE — 700101 HCHG RX REV CODE 250: Performed by: INTERNAL MEDICINE

## 2025-02-02 PROCEDURE — 85025 COMPLETE CBC W/AUTO DIFF WBC: CPT

## 2025-02-02 PROCEDURE — 80048 BASIC METABOLIC PNL TOTAL CA: CPT

## 2025-02-02 PROCEDURE — 51798 US URINE CAPACITY MEASURE: CPT

## 2025-02-02 PROCEDURE — 94760 N-INVAS EAR/PLS OXIMETRY 1: CPT

## 2025-02-02 PROCEDURE — 770006 HCHG ROOM/CARE - MED/SURG/GYN SEMI*

## 2025-02-02 RX ADMIN — HEPARIN SODIUM 5000 UNITS: 5000 INJECTION, SOLUTION INTRAVENOUS; SUBCUTANEOUS at 15:01

## 2025-02-02 RX ADMIN — IPRATROPIUM BROMIDE AND ALBUTEROL SULFATE 3 ML: .5; 2.5 SOLUTION RESPIRATORY (INHALATION) at 07:23

## 2025-02-02 RX ADMIN — CLOPIDOGREL BISULFATE 75 MG: 75 TABLET, FILM COATED ORAL at 04:53

## 2025-02-02 RX ADMIN — AMLODIPINE BESYLATE 10 MG: 10 TABLET ORAL at 04:53

## 2025-02-02 RX ADMIN — PRAMIPEXOLE DIHYDROCHLORIDE 0.5 MG: 0.5 TABLET ORAL at 04:53

## 2025-02-02 RX ADMIN — AMPICILLIN AND SULBACTAM 3 G: 1; 2 INJECTION, POWDER, FOR SOLUTION INTRAMUSCULAR; INTRAVENOUS at 04:53

## 2025-02-02 RX ADMIN — HEPARIN SODIUM 5000 UNITS: 5000 INJECTION, SOLUTION INTRAVENOUS; SUBCUTANEOUS at 22:23

## 2025-02-02 RX ADMIN — SENNOSIDES AND DOCUSATE SODIUM 2 TABLET: 50; 8.6 TABLET ORAL at 17:45

## 2025-02-02 RX ADMIN — ATORVASTATIN CALCIUM 40 MG: 40 TABLET, FILM COATED ORAL at 17:45

## 2025-02-02 RX ADMIN — LEVOTHYROXINE SODIUM 112 MCG: 0.11 TABLET ORAL at 04:53

## 2025-02-02 RX ADMIN — GABAPENTIN 300 MG: 300 CAPSULE ORAL at 04:53

## 2025-02-02 RX ADMIN — HEPARIN SODIUM 5000 UNITS: 5000 INJECTION, SOLUTION INTRAVENOUS; SUBCUTANEOUS at 04:53

## 2025-02-02 ASSESSMENT — PAIN DESCRIPTION - PAIN TYPE
TYPE: ACUTE PAIN

## 2025-02-02 ASSESSMENT — FIBROSIS 4 INDEX: FIB4 SCORE: 2.37

## 2025-02-02 NOTE — PROGRESS NOTES
[facility] -  PROGRESS NOTE               Author: Zeny Markham M.D. Date & Time: 2/2/2025  12:37 PM     HPI:  This is a 69-year-old male with past medical history of HFpEF, COPD on 6L NC at baseline, CKD stage IV with last Scr in the office of 2.3, infrarenal AAA, blindness who lives in a skilled nursing facility in [place].  Over the last several days has been more confused with generalized weakness.  He was sent to the ER at [facility] in [place] where he was found to be tachypneic with a normal heart rate and blood pressure.  Labs showed acute on chronic kidney disease with a creatinine of 6.57 and a potassium of 7.8.  He was given hyperkalemia protocol of IV calcium, albuterol and insulin.  He became mildly hypotensive therefore was given 2 L of NS and a potassium was repeated which was 7.6.  Calcium and albuterol were repeated.  Last VBG showed a pH of 7.1, pCO2 74, PaO2 80, bicarb 27, base excess -2.  A CT of the head was performed for his altered mental status which showed no acute abnormalities, CT of his abdomen showed no obstructing stone however atrophied kidneys and stable calcified infrarenal AAA, the chest portion of his CT abdomen did show a right lower lobe infiltrate and bilateral effusions and he has been given some for the possible pneumonia.  He was started on BiPAP to treat his hypercapnia.  He was also given IV Bicarbonate for his acidosis. Goals of care were discussed by the sending physician with his family who states that the patient wanted everything done including dialysis, CPR, intubation.He was flighted to [facility] for advanced care. Nephrology was consulted for THERESA/Hyperkalemia from [place] and notified by [facility] on arrival. He is currently on BiPAP in the ICU. He is currently hemodynamically stable but his BP is soft with systolics in the low 90s.  Scr is 6.19 here and Bicarb is 25. Rest of BMP is pending. Patient is not able to provide  history. This information is obtained from the medical record.      DAILY NEPHROLOGY SUMMARY:  01/29 - consult done, had HD  01/30 - K improved to 5.8, SBP 100s-130s, no UOP recorded, on BiPAP, answers some questions, slightly confused, no complaints  1/31 - tolerated HD, UF 2L, SBP 80s-120s this AM, K 5.3, UOP 1.695L, off BiPAP this AM, no new c/o, feels SOB and edema improved, no n/v/d, frustrated by NPO status (awaiting swallow eval)   2/1: Seen this Am sitting up In bed eating pancakes for breakfast. Doing well without complaints awaiting for HD this AM.   2/2: Had a fall overnight. He is sleeping in his bed this AM. Feels pretty tired from all the dialysis per patient but agreeable to anything that is recommended when asked about taking a break. He continues to make goo urine and had 1.3L UOP even with Hd and UF of 2.5L yesterday. Will hold today. Will hold off on standing orders and allow day team to reassess tomorrow for HD needs and monitor renal recovery.     REVIEW OF SYSTEMS:    10 point ROS reviewed and is as per HPI/daily summary or otherwise negative    PMH/PSH/SH/FH: Reviewed and unchanged since admission note  CURRENT MEDICATIONS: Reviewed from admission to present day    VS:  /59   Pulse 60   Temp 36.5 °C (97.7 °F) (Temporal)   Resp 18   Ht 1.829 m (6')   Wt 84.9 kg (187 lb 2.7 oz)   SpO2 98%   BMI 25.38 kg/m²   Physical Exam  Constitutional:       General: He is not in acute distress.  HENT:      Head: Normocephalic and atraumatic.      Right Ear: External ear normal.      Left Ear: External ear normal.      Nose: Nose normal.      Mouth/Throat:      Mouth: Mucous membranes are moist.      Pharynx: Oropharynx is clear.   Eyes:      General: No scleral icterus.     Extraocular Movements: Extraocular movements intact.   Cardiovascular:      Rate and Rhythm: Regular rhythm.  Pulmonary:      Effort: No tachypnea or accessory muscle usage.      Breath sounds: No wheezing.     On nasal  cannula  Abdominal:      General: There is no distension.      Palpations: Abdomen is soft.   Musculoskeletal:   No edema  Skin:     General: Skin is warm and dry.   Neurological:      Mental Status: He is alert.      Cranial Nerves: No cranial nerve deficit.      Motor: No seizure activity.   Psychiatric:         Mood and Affect: Mood normal.         Behavior: Behavior normal.   FLUID BALANCE:  In: 1820 [P.O.:1320; Dialysis:500]  Out: 3650     LABS:  Recent Labs     02/01/25 2055 02/02/25  0221 02/02/25  0501   SODIUM 137 136 140   POTASSIUM 4.4 4.3 4.2   CHLORIDE 103 104 104   CO2 25 27 25   GLUCOSE 135* 99 91   BUN 20 22 21   CREATININE 1.95* 2.11* 2.41*   CALCIUM 9.2 8.8 8.5     Recent Labs     01/31/25  0202 02/01/25  0255 02/02/25  0501   WBC 7.5 6.9 7.6   RBC 3.30* 3.52* 3.28*   HEMOGLOBIN 10.1* 10.6* 10.1*   HEMATOCRIT 32.7* 34.1* 32.5*   MCV 99.1* 96.9 99.1*   MCH 30.6 30.1 30.8   MCHC 30.9* 31.1* 31.1*   RDW 64.5* 62.0* 64.5*   PLATELETCT 165 167 146*   MPV 11.0 10.8 11.2       IMPRESSION:  # THERESA-D   - etiology is unclear but suspect possible Sepsis/ATN given imaging results(PNA)/Hypoperfusion. Had HD 3 consecutive days. Last HD 2/1  # CKD Stage IV 2/2HTN and Ischemic Nephropathy  # Acidosis, improved  # Hyperkalemia, Resolved  # Hypercapnea  # HTN  # Acute on chronic resp failure  - initial concern for PNA, on abx, blood cx NGTD  # Encephalopathy, Resolved  # Hypervolemia, Resolved  -Initially severely overloaded and resistant to diuretics , improved  -history of challenging to control volume  # CAD  # HFpEF  # AAA <4 cm  # HLD  # Hypothyroidism  # Hypoalbuminemia  # Anemia  - ferritin 313, %sat 28  - hgb goal 10-11, currently at goal  # CKD MBD  - Ca wnl phos wnl  - PTH 2/1/25= 187     PLAN:  - Will hold today. Will hold off on standing orders and allow day team to reassess tomorrow for HD needs and monitor renal recovery.   - Daily evaluation for RRT needs  - Dose all meds per eGFR < 15  - Avoid  Nephrotoxins  - Keep MAP >/= to 65  - Monitor I/Os  - daily labs  - low salt diet, fluid restriction to 1.5L per day once taking PO  - patient reports he was planning to initiate dialysis education/initiation once necessary as outpatient, aware of declining renal function. May ultimately need HD chair assignment and permanent access prior to DC if still dialysis dependent closer to discharge   -Other management per primary service

## 2025-02-02 NOTE — CARE PLAN
The patient is Stable - Low risk of patient condition declining or worsening    Shift Goals  Clinical Goals: assess need for dialysis, electrolyte balance, neuro checks  Patient Goals: updates  Family Goals: DANICA    Progress made toward(s) clinical / shift goals:    Problem: Knowledge Deficit - Standard  Goal: Patient and family/care givers will demonstrate understanding of plan of care, disease process/condition, diagnostic tests and medications  Outcome: Progressing     Problem: Skin Integrity  Goal: Skin integrity is maintained or improved  Outcome: Progressing     Problem: Fall Risk  Goal: Patient will remain free from falls  Outcome: Progressing  Note: Tele sitter in place     Problem: Hemodynamics  Goal: Patient's hemodynamics, fluid balance and neurologic status will be stable or improve  Outcome: Progressing     Problem: Respiratory  Goal: Patient will achieve/maintain optimum respiratory ventilation and gas exchange  Outcome: Progressing  Note: 6L baseline       Patient is not progressing towards the following goals:

## 2025-02-02 NOTE — PROGRESS NOTES
Pt had and unwitnessed fall. Pt found on ground laying on right side. Pt only alert and oriented to self. Chair alarm in place but did not alarm. Pt states he was trying to go find his wife. Complaints of pain to elbow and knee but states he did not hit head. Reina intact, blood sugar 120, 6L nasl cannula oxygen at 97%, bp 187/89. Pt is on heparin subq. ASH keane notified as well as daughter Devi Peters. Q4h neuro checks still in place, telesitter initiated.

## 2025-02-02 NOTE — PROGRESS NOTES
4 Eyes Skin Assessment Completed by HERMANN Burns and HERMANN Grier.    Head WDL  Ears WDL  Nose WDL  Mouth WDL  Neck WDL, Right IJ HD cath   Breast/Chest Scar  Shoulder Blades WDL  Spine WDL  (R) Arm/Elbow/Hand Bruising and Edema, redness and blanching, skin tear DIP   (L) Arm/Elbow/Hand Bruising and Edema, redness and blanching   Abdomen Scar  Groin WDL condom cath   Scrotum/Coccyx/Buttocks Redness and Blanching  (R) Leg discoloration   (L) Leg discoloration   (R) Heel/Foot/Toe Redness, Blanching, and Boggy, dry   (L) Heel/Foot/Toe Redness, Blanching, and Boggy, dry,           Devices In Places Condom Cath and Nasal Cannula, R IJ HD cath       Interventions In Place NC W/Ear Foams, Heel Mepilex, TAP System, Pillows, Q2 Turns, Barrier Cream, and Dri-Esteban Pads    Possible Skin Injury Yes    Pictures Uploaded Into Epic N/A  Wound Consult Placed Yes  RN Wound Prevention Protocol Ordered Yes

## 2025-02-02 NOTE — PROGRESS NOTES
Report given to S5 RN; pt escorted via bed and O2 to [place] [id number] with belongings on bed. Daughter was called and notified of pt's new room number.

## 2025-02-02 NOTE — CARE PLAN
Problem: Bronchoconstriction  Goal: Improve in air movement and diminished wheezing  Description: Target End Date:  2 to 3 days    1.  Implement inhaled treatments  2.  Evaluate and manage medication effects  Outcome: Progressing   Duoneb qid

## 2025-02-02 NOTE — PROGRESS NOTES
Pt arrived to unit. Pt is A&Ox4, on 6L NC, and frame alarm is on . Patient denies pain at this time. Unable to bladder scan, undetectable at this time. Encouraged pt to drink more fluids. Call light within reach and bed in lowest position. Reinforced the need to call for assistance. Plan of care discussed and patient does not have any further needs at this time.

## 2025-02-02 NOTE — PROGRESS NOTES
4 Eyes Skin Assessment Completed by HERMANN Sterling and HERMANN Root.    Head WDL  Ears WDL  Nose WDL  Mouth WDL  Neck WDL  Breast/Chest Scar  Shoulder Blades WDL  Spine WDL  (R) Arm/Elbow/Hand Redness and Blanching  (L) Arm/Elbow/Hand Redness and Blanching  Abdomen WDL  Groin WDL  Scrotum/Coccyx/Buttocks Redness and Blanching  (R) Leg WDL  (L) Leg WDL  (R) Heel/Foot/Toe Redness, Blanching, and Boggy  (L) Heel/Foot/Toe Redness, Blanching, and Boggy                Devices In Places Tele Box, Blood Pressure Cuff, Pulse Ox, Reina, and Nasal Cannula      Interventions In Place TAP System, Pillows, Elbow Mepilex, and Q2 Turns    Possible Skin Injury Yes    Pictures Uploaded Into Epic Yes  Wound Consult Placed N/A  RN Wound Prevention Protocol Ordered Yes

## 2025-02-02 NOTE — PROGRESS NOTES
Hospital Medicine Daily Progress Note    Date of Service  2/2/2025    Chief Complaint  Pb Peters is a 69 y.o. male admitted 1/29/2025 with acute hypercapnic respiratory failure, acute on chronic kidney injury and hyperkalemia.    Hospital Course  69-year-old male with a history of COPD requiring 6 L nasal cannula baseline, chronic kidney disease stage IV, infrarenal abdominal aortic aneurysm presented from [facility] due to acute hypercapnic respiratory failure, acute on chronic kidney disease causing hyperkalemia.  Labs there showed a creatinine of 6.57 with potassium of 7.8.  Patient was treated for his hyperkalemia and transferred here for need for urgent hemodialysis.  Patient was also noted to have a pH of 7.1 with a pCO2 of 74 on VBG.  CT scan of his abdomen showed a stable infrarenal abdominal aortic aneurysm, chest portion did show right lower lobe infiltrate and bilateral effusions.    Interval Problem Update  No acute events overnight.  On 5L oxygen, baseline being 6L for COPD.  Patient feels well, has no complaints. Denies dyspnea.  K normalized, Cr 2.41.  Dialysis per nephrology team. Appreciate nephrology recommendations regarding need for regular dialysis start as outpatient.  Completed antibiotics for pneumonia.  Anticipate discharge back to SNF when medically cleared.  Pending nephrology clearance.    I have discussed this patient's plan of care and discharge plan at IDT rounds today with Case Management, Nursing, Nursing leadership, and other members of the IDT team.    Consultants/Specialty  nephrology    Code Status  Full Code    Disposition  Medically Cleared  I have placed the appropriate orders for post-discharge needs.    Review of Systems  Review of Systems   Unable to perform ROS: Mental acuity        Physical Exam  Temp:  [36 °C (96.8 °F)-36.8 °C (98.2 °F)] 36.3 °C (97.3 °F)  Pulse:  [55-86] 62  Resp:  [15-20] 17  BP: (110-152)/()  123/105  SpO2:  [93 %-98 %] 97 %    Physical Exam  Vitals and nursing note reviewed.   Constitutional:       General: He is not in acute distress.     Appearance: He is well-developed. He is ill-appearing. He is not diaphoretic.   HENT:      Head: Normocephalic and atraumatic.      Right Ear: External ear normal.      Left Ear: External ear normal.      Nose: No congestion or rhinorrhea.      Mouth/Throat:      Mouth: Mucous membranes are moist.      Pharynx: No oropharyngeal exudate.   Eyes:      General:         Right eye: No discharge.         Left eye: No discharge.      Conjunctiva/sclera: Conjunctivae normal.   Neck:      Trachea: No tracheal deviation.   Cardiovascular:      Rate and Rhythm: Normal rate and regular rhythm.      Heart sounds: Murmur heard.      No friction rub. No gallop.   Pulmonary:      Effort: Pulmonary effort is normal. No respiratory distress.      Breath sounds: Rales present. No wheezing.   Abdominal:      General: Abdomen is flat. Bowel sounds are normal. There is no distension.      Palpations: Abdomen is soft.   Musculoskeletal:      Cervical back: Neck supple. No edema or erythema.      Right lower leg: Edema present.      Left lower leg: Edema present.   Lymphadenopathy:      Cervical: No cervical adenopathy.   Skin:     General: Skin is warm and dry.      Findings: No erythema or rash.   Neurological:      Mental Status: He is alert.      Comments: Awake but confused   Psychiatric:         Speech: Speech is delayed.         Behavior: Behavior is slowed.         Cognition and Memory: Cognition is impaired. Memory is impaired.         Fluids    Intake/Output Summary (Last 24 hours) at 2/2/2025 1438  Last data filed at 2/2/2025 1200  Gross per 24 hour   Intake 900 ml   Output 950 ml   Net -50 ml        Laboratory  Recent Labs     01/31/25  0202 02/01/25  0255 02/02/25  0501   WBC 7.5 6.9 7.6   RBC 3.30* 3.52* 3.28*   HEMOGLOBIN 10.1* 10.6* 10.1*   HEMATOCRIT 32.7* 34.1* 32.5*   MCV  99.1* 96.9 99.1*   MCH 30.6 30.1 30.8   MCHC 30.9* 31.1* 31.1*   RDW 64.5* 62.0* 64.5*   PLATELETCT 165 167 146*   MPV 11.0 10.8 11.2     Recent Labs     02/01/25 2055 02/02/25  0221 02/02/25  0501   SODIUM 137 136 140   POTASSIUM 4.4 4.3 4.2   CHLORIDE 103 104 104   CO2 25 27 25   GLUCOSE 135* 99 91   BUN 20 22 21   CREATININE 1.95* 2.11* 2.41*   CALCIUM 9.2 8.8 8.5                   Imaging  DX-CHEST-PORTABLE (1 VIEW)   Final Result      Right IJ catheter terminates in the region of the SVC. No pneumothorax.      Bibasilar opacities.           Assessment/Plan  * Hyperkalemia- (present on admission)  Assessment & Plan  Secondary to acute on chronic kidney disease  Persistent, will need ongoing hemodialysis  Closely monitor on telemetry  Patient is at high risk of worsening and will need close monitoring in the IMCU due to ongoing hyperkalemia  Plan is for hemodialysis shortly, no need for treatment of his hyperkalemia  Repeat BMP in the morning  Now resolved with multiple days of hemodialysis  Repeat BMP in the morning    Bacterial pneumonia- (present on admission)  Assessment & Plan  Was a concern for bacterial pneumonia so patient was placed on antibiotics  Continue Unasyn  Normal procalcitonin  Unasyn will finish tomorrow    Shock (HCC)  Assessment & Plan  Did have short episode of shock that did require Levophed however this has been off for over 24 hours and now blood pressure has increased  Restarting home antihypertensive    Toxic metabolic encephalopathy  Assessment & Plan  Patient continues to be encephalopathic  Likely uremic due to acute on chronic kidney disease  Also possibly secondary to pneumonia  Continue Unasyn  Hemodialysis per nephrology  CT head showed nothing acute    1/31  Overnight associated with severe agitation  Patient was placed in restraints and Precedex drip was initiated    2/1  Agitation has improved yet patient remains confused  I am unsure what his baseline is  No need for  Precedex drip or restraints    Chronic hypoxemic respiratory failure (HCC)  Assessment & Plan  Patient does have a history of COPD and does require 6 L of oxygen at baseline   CT chest showed changes consistent with right lower lobe pneumonia   Patient has been started on Unasyn and azithromycin   Patient did have acute worsening and was requiring BiPAP however now with good sats on 8-10 L  Patient continues to have decreased breath sounds, this is likely chronic, no need for steroids at this time    Acute on chronic renal failure (HCC)  Assessment & Plan  Acute kidney injury on top on chronic kidney disease   Associated with severe hyperkalemia   Because of this, nephrology was consulted,   It was deemed he will need long-term hemodialysis, acutely secondary to hyperkalemia   Family was contacted and agreed with treatment plan.   Patient is now status post hemodialysis   Has ongoing hyperkalemia   Will need hemodialysis daily until improvement   Additional recommendations per nephrology   Repeat BMP in the morning    1/31  Continues to develop hyperkalemia shortly after hemodialysis  Plan is for dialysis again today as well as tomorrow  Repeat BMP in the morning    2/1  Hemodialysis again today however no plans for tomorrow  Closely monitor on telemetry, high risk of developing hyperkalemia again  Repeat BMP in the morning    Hypertension, essential- (present on admission)  Assessment & Plan  Initially, blood pressure was low  Has now markedly increased  Restart home amlodipine  Start as needed hydralazine  Adjust as needed    Abdominal aortic aneurysm (AAA) without rupture (HCC)- (present on admission)  Assessment & Plan  Continue outpatient follow-up   No acute worsening   Does appear at baseline it is around 5.5 cm          VTE prophylaxis: VTE Selection    I have performed a physical exam and reviewed and updated ROS and Plan today (2/2/2025). In review of yesterday's note (2/1/2025), there are no changes  except as documented above.

## 2025-02-02 NOTE — PROGRESS NOTES
Pt informed of results and Rx was sent to pharmacy   Received report from ICU RN. Pt arrived to unit at 1530 via bed escorted by RN and tech. Assessment complete. VSS. No signs of distress noted at this time. Tele monitor in place. Monitor room notified. Pt c/o pain 0/10. Fall precautions and appropriate signs in place. Pt oriented to unit routine, call light/phone system within reach. Personal belongings within reach. Pt educated regarding fall precautions. Bed alarm is on. No isolation precautions in place. Pt denies any further needs at this time.

## 2025-02-02 NOTE — PROGRESS NOTES
Monitor summary:        Rhythm: SR  Rate: 82  Ectopy: 0  Measurements: .13/.08/.37        12hr chart check

## 2025-02-02 NOTE — CARE PLAN
The patient is Stable - Low risk of patient condition declining or worsening    Shift Goals  Clinical Goals: dialysis, electrolyte balance, monitor cognition  Patient Goals: up to chair  Family Goals: DANICA    Progress made toward(s) clinical / shift goals:    Problem: Knowledge Deficit - Standard  Goal: Patient and family/care givers will demonstrate understanding of plan of care, disease process/condition, diagnostic tests and medications  Outcome: Progressing     Problem: Skin Integrity  Goal: Skin integrity is maintained or improved  Outcome: Progressing     Problem: Fall Risk  Goal: Patient will remain free from falls  Outcome: Progressing     Problem: Hemodynamics  Goal: Patient's hemodynamics, fluid balance and neurologic status will be stable or improve  Outcome: Progressing  Note: Pt continues to be aox1     Problem: Respiratory  Goal: Patient will achieve/maintain optimum respiratory ventilation and gas exchange  Outcome: Progressing  Note: Baseline 6L O2       Patient is not progressing towards the following goals:

## 2025-02-02 NOTE — CARE PLAN
The patient is Watcher - Medium risk of patient condition declining or worsening    Shift Goals  Clinical Goals: dialysis, electrolyte balance, monitor cognition  Patient Goals: up to chair  Family Goals: DANICA    Progress made toward(s) clinical / shift goals:            Problem: Skin Integrity  Goal: Skin integrity is maintained or improved  Outcome: Progressing     Problem: Fall Risk  Goal: Patient will remain free from falls  Outcome: Progressing     Problem: Respiratory  Goal: Patient will achieve/maintain optimum respiratory ventilation and gas exchange  Outcome: Progressing

## 2025-02-03 LAB
ALBUMIN SERPL BCP-MCNC: 3 G/DL (ref 3.2–4.9)
BUN SERPL-MCNC: 26 MG/DL (ref 8–22)
CALCIUM ALBUM COR SERPL-MCNC: 9.6 MG/DL (ref 8.5–10.5)
CALCIUM SERPL-MCNC: 8.8 MG/DL (ref 8.5–10.5)
CHLORIDE SERPL-SCNC: 104 MMOL/L (ref 96–112)
CO2 SERPL-SCNC: 25 MMOL/L (ref 20–33)
CREAT SERPL-MCNC: 2.6 MG/DL (ref 0.5–1.4)
GFR SERPLBLD CREATININE-BSD FMLA CKD-EPI: 26 ML/MIN/1.73 M 2
GLUCOSE SERPL-MCNC: 93 MG/DL (ref 65–99)
PHOSPHATE SERPL-MCNC: 5 MG/DL (ref 2.5–4.5)
POTASSIUM SERPL-SCNC: 4.3 MMOL/L (ref 3.6–5.5)
SODIUM SERPL-SCNC: 136 MMOL/L (ref 135–145)

## 2025-02-03 PROCEDURE — 700102 HCHG RX REV CODE 250 W/ 637 OVERRIDE(OP): Performed by: INTERNAL MEDICINE

## 2025-02-03 PROCEDURE — 80069 RENAL FUNCTION PANEL: CPT

## 2025-02-03 PROCEDURE — A9270 NON-COVERED ITEM OR SERVICE: HCPCS | Performed by: INTERNAL MEDICINE

## 2025-02-03 PROCEDURE — A9270 NON-COVERED ITEM OR SERVICE: HCPCS | Performed by: EMERGENCY MEDICINE

## 2025-02-03 PROCEDURE — 99233 SBSQ HOSP IP/OBS HIGH 50: CPT | Performed by: INTERNAL MEDICINE

## 2025-02-03 PROCEDURE — 51798 US URINE CAPACITY MEASURE: CPT

## 2025-02-03 PROCEDURE — 770006 HCHG ROOM/CARE - MED/SURG/GYN SEMI*

## 2025-02-03 PROCEDURE — 700102 HCHG RX REV CODE 250 W/ 637 OVERRIDE(OP): Performed by: EMERGENCY MEDICINE

## 2025-02-03 PROCEDURE — 700111 HCHG RX REV CODE 636 W/ 250 OVERRIDE (IP): Performed by: INTERNAL MEDICINE

## 2025-02-03 RX ORDER — IPRATROPIUM BROMIDE AND ALBUTEROL SULFATE 2.5; .5 MG/3ML; MG/3ML
3 SOLUTION RESPIRATORY (INHALATION)
Status: CANCELLED | OUTPATIENT
Start: 2025-02-03

## 2025-02-03 RX ADMIN — LEVOTHYROXINE SODIUM 112 MCG: 0.11 TABLET ORAL at 05:40

## 2025-02-03 RX ADMIN — PRAMIPEXOLE DIHYDROCHLORIDE 0.5 MG: 0.5 TABLET ORAL at 05:40

## 2025-02-03 RX ADMIN — ATORVASTATIN CALCIUM 40 MG: 40 TABLET, FILM COATED ORAL at 17:13

## 2025-02-03 RX ADMIN — SENNOSIDES AND DOCUSATE SODIUM 2 TABLET: 50; 8.6 TABLET ORAL at 17:13

## 2025-02-03 RX ADMIN — HEPARIN SODIUM 5000 UNITS: 5000 INJECTION, SOLUTION INTRAVENOUS; SUBCUTANEOUS at 05:41

## 2025-02-03 RX ADMIN — GABAPENTIN 300 MG: 300 CAPSULE ORAL at 05:40

## 2025-02-03 RX ADMIN — HEPARIN SODIUM 5000 UNITS: 5000 INJECTION, SOLUTION INTRAVENOUS; SUBCUTANEOUS at 13:19

## 2025-02-03 RX ADMIN — CLOPIDOGREL BISULFATE 75 MG: 75 TABLET, FILM COATED ORAL at 05:40

## 2025-02-03 RX ADMIN — AMLODIPINE BESYLATE 10 MG: 10 TABLET ORAL at 05:41

## 2025-02-03 RX ADMIN — HEPARIN SODIUM 5000 UNITS: 5000 INJECTION, SOLUTION INTRAVENOUS; SUBCUTANEOUS at 20:58

## 2025-02-03 ASSESSMENT — COGNITIVE AND FUNCTIONAL STATUS - GENERAL
TURNING FROM BACK TO SIDE WHILE IN FLAT BAD: A LITTLE
DRESSING REGULAR UPPER BODY CLOTHING: A LITTLE
PERSONAL GROOMING: A LITTLE
EATING MEALS: A LITTLE
TOILETING: A LITTLE
CLIMB 3 TO 5 STEPS WITH RAILING: A LOT
HELP NEEDED FOR BATHING: A LOT
WALKING IN HOSPITAL ROOM: A LOT
MOVING FROM LYING ON BACK TO SITTING ON SIDE OF FLAT BED: A LITTLE
MOBILITY SCORE: 16
DRESSING REGULAR LOWER BODY CLOTHING: A LOT
DAILY ACTIVITIY SCORE: 16
SUGGESTED CMS G CODE MODIFIER DAILY ACTIVITY: CK
SUGGESTED CMS G CODE MODIFIER MOBILITY: CK
MOVING TO AND FROM BED TO CHAIR: A LITTLE
STANDING UP FROM CHAIR USING ARMS: A LITTLE

## 2025-02-03 ASSESSMENT — ENCOUNTER SYMPTOMS
SHORTNESS OF BREATH: 0
CHILLS: 0
FEVER: 0

## 2025-02-03 ASSESSMENT — PAIN DESCRIPTION - PAIN TYPE
TYPE: ACUTE PAIN
TYPE: ACUTE PAIN

## 2025-02-03 NOTE — DISCHARGE PLANNING
VINNY reached out to [facility] in [place].  I spoke with the Admissions coordinator.    They do not take dialysis patients.  They are concerned about taking him back as he is a higher acuity and has been to the ER a couple of times prior to transfer to [facility].    VINNY met with Pb at bedside.  He is very pleasant and does want to return to [place] if possible.  He does understand that there could be an issue with that.

## 2025-02-03 NOTE — PROGRESS NOTES
[facility] -  PROGRESS NOTE               Author: Praneeth Lassiter M.D. Date & Time: 2/3/2025  2:28 PM     HPI:  This is a 69-year-old male with past medical history of HFpEF, COPD on 6L NC at baseline, CKD stage IV with last Scr in the office of 2.3, infrarenal AAA, blindness who lives in a skilled nursing facility in [place].  Over the last several days has been more confused with generalized weakness.  He was sent to the ER at [facility] in [place] where he was found to be tachypneic with a normal heart rate and blood pressure.  Labs showed acute on chronic kidney disease with a creatinine of 6.57 and a potassium of 7.8.  He was given hyperkalemia protocol of IV calcium, albuterol and insulin.  He became mildly hypotensive therefore was given 2 L of NS and a potassium was repeated which was 7.6.  Calcium and albuterol were repeated.  Last VBG showed a pH of 7.1, pCO2 74, PaO2 80, bicarb 27, base excess -2.  A CT of the head was performed for his altered mental status which showed no acute abnormalities, CT of his abdomen showed no obstructing stone however atrophied kidneys and stable calcified infrarenal AAA, the chest portion of his CT abdomen did show a right lower lobe infiltrate and bilateral effusions and he has been given some for the possible pneumonia.  He was started on BiPAP to treat his hypercapnia.  He was also given IV Bicarbonate for his acidosis. Goals of care were discussed by the sending physician with his family who states that the patient wanted everything done including dialysis, CPR, intubation.He was flighted to [facility] for advanced care. Nephrology was consulted for THERESA/Hyperkalemia from [place] and notified by [facility] on arrival. He is currently on BiPAP in the ICU. He is currently hemodynamically stable but his BP is soft with systolics in the low 90s.  Scr is 6.19 here and Bicarb is 25. Rest of BMP is pending. Patient is not able to provide history.  This information is obtained from the medical record.      DAILY NEPHROLOGY SUMMARY:  1/29 - consult done, had HD  1/30 - K improved to 5.8, SBP 100s-130s, no UOP recorded, on BiPAP, answers some questions, slightly confused, no complaints  1/31 - tolerated HD, UF 2L, SBP 80s-120s this AM, K 5.3, UOP 1.695L, off BiPAP this AM, no new c/o, feels SOB and edema improved, no n/v/d, frustrated by NPO status (awaiting swallow eval)  2/01: Seen this Am sitting up In bed eating pancakes for breakfast. Doing well without complaints awaiting for HD this AM.   2/02: Had a fall overnight. He is sleeping in his bed this AM. Feels pretty tired from all the dialysis per patient but agreeable to anything that is recommended when asked about taking a break. He continues to make goo urine and had 1.3L UOP even with Hd and UF of 2.5L yesterday. Will hold today. Will hold off on standing orders and allow day team to reassess tomorrow for HD needs and monitor renal recovery.   2/03: NAEO, no complaints, sitting in chair at bedside, feels same as yesterday    REVIEW OF SYSTEMS:    10 point ROS reviewed and is as per HPI/daily summary or otherwise negative    PMH/PSH/SH/FH: Reviewed and unchanged since admission note  CURRENT MEDICATIONS: Reviewed from admission to present day    VS:  /62   Pulse 71   Temp 36.2 °C (97.1 °F) (Temporal)   Resp 18   Ht 1.829 m (6')   Wt 84.9 kg (187 lb 2.7 oz)   SpO2 96%   BMI 25.38 kg/m²   Physical Exam  Nursing note reviewed.   Constitutional:       Appearance: Normal appearance.   Eyes:      General: No scleral icterus.  Cardiovascular:      Comments: No edema  Pulmonary:      Effort: Pulmonary effort is normal.   Abdominal:      General: There is no distension.      Tenderness: There is left CVA tenderness.   Musculoskeletal:         General: No deformity.   Skin:     Findings: No rash.   Neurological:      Mental Status: He is alert.   Psychiatric:         Behavior: Behavior is cooperative.          FLUID BALANCE:  In: 960 [P.O.:960]  Out: 500     LABS:  Recent Labs     02/02/25  0221 02/02/25  0501 02/03/25  0550   SODIUM 136 140 136   POTASSIUM 4.3 4.2 4.3   CHLORIDE 104 104 104   CO2 27 25 25   GLUCOSE 99 91 93   BUN 22 21 26*   CREATININE 2.11* 2.41* 2.60*   CALCIUM 8.8 8.5 8.8     Recent Labs     02/01/25  0255 02/02/25  0501   WBC 6.9 7.6   RBC 3.52* 3.28*   HEMOGLOBIN 10.6* 10.1*   HEMATOCRIT 34.1* 32.5*   MCV 96.9 99.1*   MCH 30.1 30.8   MCHC 31.1* 31.1*   RDW 62.0* 64.5*   PLATELETCT 167 146*   MPV 10.8 11.2     IMPRESSION:  # THERESA-DD  - Etiology is unclear, but suspect ATN  - HD x 3 done. Last HD 2/1  - SCr slightly up from yesterday, UOP non oliguric  # CKD Stage IV 2/2 HTN and Ischemic Nephropathy  # Acidosis, improved  # Hyperkalemia, Resolved  # Hypercapnea  # HTN  # Acute on chronic resp failure  - initial concern for PNA, on abx, blood cx NGTD  # Encephalopathy, Resolved  # Hypervolemia, Resolved  -Initially severely overloaded and resistant to diuretics , improved  -history of challenging to control volume  # CAD  # HFpEF  # AAA <4 cm  # HLD  # Hypothyroidism  # Hypoalbuminemia  # Anemia  - ferritin 313, %sat 28  - hgb goal 10-11, currently at goal  # CKD MBD  - Ca wnl phos wnl  - PTH 2/1/25= 187     PLAN:  - Hold iHD today  - Daily evaluation for KRT needs  - Dose all meds per eGFR < 15  - Monitor I/Os  - Daily labs  - low salt diet, fluid restriction to 1.5L per day once taking PO

## 2025-02-03 NOTE — PROGRESS NOTES
Assumed care of pt from HS RN. He is resting with eyes closed, respirations even call light in reach. Bed low and locked. Bed frame alarm on

## 2025-02-03 NOTE — CARE PLAN
Problem: Knowledge Deficit - Standard  Goal: Patient and family/care givers will demonstrate understanding of plan of care, disease process/condition, diagnostic tests and medications  Description: Target End Date:  1-3 days or as soon as patient condition allows    Document in Patient Education    1.  Patient and family/caregiver oriented to unit, equipment, visitation policy and means for communicating concern  2.  Complete/review Learning Assessment  3.  Assess knowledge level of disease process/condition, treatment plan, diagnostic tests and medications  4.  Explain disease process/condition, treatment plan, diagnostic tests and medications  Outcome: Progressing     Problem: Skin Integrity  Goal: Skin integrity is maintained or improved  Description: Target End Date:  Prior to discharge or change in level of care    Document interventions on Skin Risk/Mauricio flowsheet groups and corresponding LDA    1.  Assess and monitor skin integrity, appearance and/or temperature  2.  Assess risk factors for impaired skin integrity and/or pressures ulcers  3.  Implement precautions to protect skin integrity in collaboration with interdisciplinary team  4.  Implement pressure ulcer prevention protocol if at risk for skin breakdown  5.  Confirm wound care consult if at risk for skin breakdown  6.  Ensure patient use of pressure relieving devices  (Low air loss bed, waffle overlay, heel protectors, ROHO cushion, etc)  Outcome: Progressing     Problem: Fall Risk  Goal: Patient will remain free from falls  Description: Target End Date:  Prior to discharge or change in level of care    Document interventions on the Isabel Jones Fall Risk Assessment    1.  Assess for fall risk factors  2.  Implement fall precautions  Outcome: Progressing     Problem: Respiratory  Goal: Patient will achieve/maintain optimum respiratory ventilation and gas exchange  Description: Target End Date:  Prior to discharge or change in level of  care    Document on Assessment flowsheet    1.  Assess and monitor rate, rhythm, depth and effort of respiration  2.  Breath sounds assessed qshift and/or as needed  3.  Assess O2 saturation, administer/titrate oxygen as ordered  4.  Position patient for maximum ventilatory efficiency  5.  Turn, cough, and deep breath with splinting to improve effectiveness  6.  Collaborate with RT to administer medication/treatments per order  7.  Encourage use of incentive spirometer and encourage patient to cough after use and utilize splinting techniques if applicable  8.  Airway suctioning  9.  Monitor sputum production for changes in color, consistency and frequency  10. Perform frequent oral hygiene  11. Alternate physical activity with rest periods  Outcome: Progressing     Problem: Pain - Standard  Goal: Alleviation of pain or a reduction in pain to the patient’s comfort goal  Description: Target End Date:  Prior to discharge or change in level of care    Document on Vitals flowsheet    1.  Document pain using the appropriate pain scale per order or unit policy  2.  Educate and implement non-pharmacologic comfort measures (i.e. relaxation, distraction, massage, cold/heat therapy, etc.)  3.  Pain management medications as ordered  4.  Reassess pain after pain med administration per policy  5.  If opiods administered assess patient's response to pain medication is appropriate per POSS sedation scale  6.  Follow pain management plan developed in collaboration with patient and interdisciplinary team (including palliative care or pain specialists if applicable)  Outcome: Progressing   The patient is Stable - Low risk of patient condition declining or worsening    Shift Goals  Clinical Goals: labs, neuro checks  Patient Goals: rest  Family Goals: DANICA    Progress made toward(s) clinical / shift goals:  Pt voices no pain throughout shift. Pt EOB for 20 minutes. Monitoring respiratory status. Fall risk precautions in place,  remaining free from falls. Neuro checks in place. Pt resting throughout the night.    Patient is not progressing towards the following goals:

## 2025-02-03 NOTE — PROGRESS NOTES
4 Eyes Skin Assessment Completed by HERMANN Hunter and HERMANN Bynum.    Head WDL  Ears WDL  Nose WDL  Mouth WDL  Neck WDL  Breast/Chest Scar  Shoulder Blades WDL  Spine WDL  (R) Arm/Elbow/Hand Redness and Blanching, skin tear, edema, bruising  (L) Arm/Elbow/Hand Redness, Blanching, Bruising, and Edema  Abdomen Scar  Groin WDL  Scrotum/Coccyx/Buttocks Redness and Blanching, bruise   (R) Leg Scar, discoloration  (L) Leg discoloration  (R) Heel/Foot/Toe Redness, Blanching, and Boggy  (L) Heel/Foot/Toe Redness, Blanching, and Boggy          Devices In Places Condom Cath and Nasal Cannula, R HD cath      Interventions In Place NC W/Ear Foams, Heel Mepilex, TAP System, Pillows, Q2 Turns, Barrier Cream, Dri-Esteban Pads, and Heels Loaded W/Pillows    Possible Skin Injury Yes established    Pictures Uploaded Into Epic N/A  Wound Consult Placed Yes,   RN Wound Prevention Protocol Ordered Yes established

## 2025-02-03 NOTE — PROGRESS NOTES
4 Eyes Skin Assessment Completed by HERMANN diaz and HERMANN quezada.    Head WDL  Ears Redness  Nose WDL  Mouth WDL  Neck WDL  Breast/Chest Scar  Shoulder Blades WDL  Spine WDL  (R) Arm/Elbow/Hand Redness, Abrasion, Swelling, and Discoloration  (L) Arm/Elbow/Hand Redness, Bruising, and Edema  Abdomen Scar  Groin Redness  Scrotum/Coccyx/Buttocks WDL  (R) Leg Scar  (L) Leg Redness  (R) Heel/Foot/Toe Redness, Blanching, and Boggy  (L) Heel/Foot/Toe Boggy          Devices In Places Pulse Ox, Central Line, Condom Cath, and Nasal Cannula      Interventions In Place NC W/Ear Foams, Heel Mepilex, Heel Float Boots, Pillows, Elbow Mepilex, Q2 Turns, Barrier Cream, and Heels Loaded W/Pillows    Possible Skin Injury Yes    Pictures Uploaded Into Epic N/A  Wound Consult Placed Yes  RN Wound Prevention Protocol Ordered Yes

## 2025-02-03 NOTE — PROGRESS NOTES
Hospital Medicine Daily Progress Note    Date of Service  2/3/2025    Chief Complaint  Pb Peters is a 69 y.o. male admitted 1/29/2025 with acute hypercapnic respiratory failure, acute on chronic kidney injury and hyperkalemia.    Hospital Course  69-year-old male with a history of COPD requiring 6 L nasal cannula baseline, chronic kidney disease stage IV, infrarenal abdominal aortic aneurysm presented from [facility] due to acute hypercapnic respiratory failure, acute on chronic kidney disease causing hyperkalemia.  Labs there showed a creatinine of 6.57 with potassium of 7.8.  Patient was treated for his hyperkalemia and transferred here for need for urgent hemodialysis.  Patient was also noted to have a pH of 7.1 with a pCO2 of 74 on VBG.  CT scan of his abdomen showed a stable infrarenal abdominal aortic aneurysm, chest portion did show right lower lobe infiltrate and bilateral effusions.    Interval Problem Update  2/3 vital stable on 5 L nasal cannula satting 97%  renal function creatinine 2.6, GFR 26, phosphorus 5  Nephrology monitoring kidney function daily to evaluate for outpatient dialysis needs  Unclear if urine output recorded is accurate, 200 mL in last 24 hours  Patient's main complaint is that he is uncomfortable.  Denies any overt pain but just cannot get comfortable in bed.  He denies any nausea, chest pain or shortness of breath.    I have discussed this patient's plan of care and discharge plan at IDT rounds today with Case Management, Nursing, Nursing leadership, and other members of the IDT team.    Consultants/Specialty  nephrology    Code Status  Full Code    Disposition  The patient is not medically cleared for discharge to home or a post-acute facility.  Anticipate discharge to: skilled nursing facility    I have placed the appropriate orders for post-discharge needs.    Review of Systems  Review of Systems   Unable to perform ROS: Mental acuity    Constitutional:  Positive for malaise/fatigue. Negative for chills and fever.   Eyes:         Blind   Respiratory:  Negative for shortness of breath.    Cardiovascular:  Negative for chest pain.        Physical Exam  Temp:  [35.9 °C (96.7 °F)-36.4 °C (97.6 °F)] 36.2 °C (97.1 °F)  Pulse:  [62-75] 71  Resp:  [16-18] 18  BP: (123-142)/() 127/62  SpO2:  [96 %-98 %] 98 %    Physical Exam  Vitals and nursing note reviewed.   Constitutional:       General: He is not in acute distress.     Appearance: He is well-developed. He is ill-appearing.   HENT:      Head: Normocephalic and atraumatic.      Mouth/Throat:      Pharynx: Oropharynx is clear.   Eyes:      Comments: Chronic blindness   Neck:      Trachea: No tracheal deviation.   Cardiovascular:      Rate and Rhythm: Normal rate and regular rhythm.      Heart sounds: Murmur heard.   Pulmonary:      Effort: Pulmonary effort is normal. No respiratory distress.      Breath sounds: Rales present. No wheezing.      Comments: On NC   Abdominal:      General: Abdomen is flat. There is no distension.      Palpations: Abdomen is soft.   Musculoskeletal:      Cervical back: Neck supple. No edema or erythema.      Right lower leg: Edema present.      Left lower leg: Edema present.   Skin:     General: Skin is warm and dry.      Findings: No rash.   Neurological:      Mental Status: He is alert.      Motor: Weakness present.      Comments: Awake but confused   Psychiatric:         Speech: Speech is delayed.         Behavior: Behavior is slowed.         Cognition and Memory: Cognition is impaired. Memory is impaired.         Fluids    Intake/Output Summary (Last 24 hours) at 2/3/2025 1314  Last data filed at 2/3/2025 1000  Gross per 24 hour   Intake 780 ml   Output 200 ml   Net 580 ml        Laboratory  Recent Labs     02/01/25  0255 02/02/25  0501   WBC 6.9 7.6   RBC 3.52* 3.28*   HEMOGLOBIN 10.6* 10.1*   HEMATOCRIT 34.1* 32.5*   MCV 96.9 99.1*   MCH 30.1 30.8   MCHC 31.1*  31.1*   RDW 62.0* 64.5*   PLATELETCT 167 146*   MPV 10.8 11.2     Recent Labs     02/02/25  0221 02/02/25  0501 02/03/25  0550   SODIUM 136 140 136   POTASSIUM 4.3 4.2 4.3   CHLORIDE 104 104 104   CO2 27 25 25   GLUCOSE 99 91 93   BUN 22 21 26*   CREATININE 2.11* 2.41* 2.60*   CALCIUM 8.8 8.5 8.8                   Imaging  DX-CHEST-PORTABLE (1 VIEW)   Final Result      Right IJ catheter terminates in the region of the SVC. No pneumothorax.      Bibasilar opacities.           Assessment/Plan  * Hyperkalemia- (present on admission)  Assessment & Plan  Secondary to acute on chronic kidney disease  Persistent, will need ongoing hemodialysis  Closely monitor on telemetry  Patient is at high risk of worsening and will need close monitoring in the IMCU due to ongoing hyperkalemia  Plan is for hemodialysis shortly, no need for treatment of his hyperkalemia  Repeat BMP in the morning  Now resolved with multiple days of hemodialysis  Repeat BMP in the morning    Bacterial pneumonia- (present on admission)  Assessment & Plan  Was a concern for bacterial pneumonia so patient was placed on antibiotics  Continue Unasyn  Normal procalcitonin  Finished course of antibiotics    Shock (HCC)  Assessment & Plan  Did have short episode of shock that did require Levophed however this has been off for over 24 hours and now blood pressure has increased  Restarting home antihypertensive    Toxic metabolic encephalopathy  Assessment & Plan  Patient continues to be encephalopathic  Likely uremic due to acute on chronic kidney disease  Also possibly secondary to pneumonia  Continue Unasyn  Hemodialysis per nephrology  CT head showed nothing acute    1/31  Overnight associated with severe agitation  Patient was placed in restraints and Precedex drip was initiated    2/1  Agitation has improved yet patient remains confused  I am unsure what his baseline is  No need for Precedex drip or restraints    Chronic hypoxemic respiratory failure  ([facility])  Assessment & Plan  Patient does have a history of COPD and does require 6 L of oxygen at baseline   CT chest showed changes consistent with right lower lobe pneumonia   Patient has been started on Unasyn and azithromycin   Patient did have acute worsening and was requiring BiPAP however now with good sats on 8-10 L  Patient continues to have decreased breath sounds, this is likely chronic, no need for steroids at this time    Currently on 5 L, unclear baseline different documentation 3 L and 6 L    Acute on chronic renal failure (HCC)  Assessment & Plan  Acute kidney injury on top on chronic kidney disease   Associated with severe hyperkalemia   Nephrology consulted, temporary dialysis catheter was placed and he was started on dialysis  Family was contacted and agreed with treatment plan.   Repeat BMP daily  Nephrology following to determine daily/long-term needs for dialysis.  If he needs dialysis on discharge he will need HD chair set up and permacath placed    Hypertension, essential- (present on admission)  Assessment & Plan  Initially, blood pressure was low  Has now markedly increased  Restart home amlodipine  Start as needed hydralazine  Adjust as needed    Abdominal aortic aneurysm (AAA) without rupture ([facility])- (present on admission)  Assessment & Plan  Continue outpatient follow-up   No acute worsening   Does appear at baseline it is around 5.5 cm     Total time spent in chart review, at bedside with the patient, discussing with consultants, nursing and case management: 53 minutes    VTE prophylaxis:    heparin ppx    I have performed a physical exam and reviewed and updated ROS and Plan today (2/3/2025). In review of yesterday's note (2/2/2025), there are no changes except as documented above.

## 2025-02-04 ENCOUNTER — APPOINTMENT (OUTPATIENT)
Dept: RADIOLOGY | Facility: MEDICAL CENTER | Age: 70
DRG: 682 | End: 2025-02-04
Attending: INTERNAL MEDICINE
Payer: MEDICARE

## 2025-02-04 ENCOUNTER — APPOINTMENT (OUTPATIENT)
Dept: RADIOLOGY | Facility: MEDICAL CENTER | Age: 70
DRG: 682 | End: 2025-02-04
Attending: STUDENT IN AN ORGANIZED HEALTH CARE EDUCATION/TRAINING PROGRAM
Payer: MEDICARE

## 2025-02-04 PROBLEM — J96.22 ACUTE ON CHRONIC RESPIRATORY FAILURE WITH HYPOXIA AND HYPERCAPNIA (HCC): Status: ACTIVE | Noted: 2025-01-29

## 2025-02-04 PROBLEM — J96.21 ACUTE ON CHRONIC RESPIRATORY FAILURE WITH HYPOXIA AND HYPERCAPNIA (HCC): Status: ACTIVE | Noted: 2025-01-29

## 2025-02-04 LAB
ALBUMIN SERPL BCP-MCNC: 3.1 G/DL (ref 3.2–4.9)
AMMONIA PLAS-SCNC: 21 UMOL/L (ref 11–45)
ANION GAP SERPL CALC-SCNC: 14 MMOL/L (ref 7–16)
APPEARANCE UR: ABNORMAL
ARTERIAL PATENCY WRIST A: ABNORMAL
BACTERIA #/AREA URNS HPF: ABNORMAL /HPF
BACTERIA BLD CULT: NORMAL
BACTERIA BLD CULT: NORMAL
BASE EXCESS BLDA CALC-SCNC: -1 MMOL/L (ref -4–3)
BASE EXCESS BLDA CALC-SCNC: -4 MMOL/L (ref -4–3)
BASE EXCESS BLDA CALC-SCNC: 4 MMOL/L (ref -4–3)
BILIRUB UR QL STRIP.AUTO: NEGATIVE
BODY TEMPERATURE: 36.8 CENTIGRADE
BODY TEMPERATURE: ABNORMAL DEGREES
BODY TEMPERATURE: ABNORMAL DEGREES
BUN SERPL-MCNC: 33 MG/DL (ref 8–22)
BUN SERPL-MCNC: 36 MG/DL (ref 8–22)
CALCIUM ALBUM COR SERPL-MCNC: 9.8 MG/DL (ref 8.5–10.5)
CALCIUM SERPL-MCNC: 9 MG/DL (ref 8.5–10.5)
CALCIUM SERPL-MCNC: 9.1 MG/DL (ref 8.5–10.5)
CASTS URNS QL MICRO: ABNORMAL /LPF (ref 0–2)
CHLORIDE SERPL-SCNC: 103 MMOL/L (ref 96–112)
CHLORIDE SERPL-SCNC: 103 MMOL/L (ref 96–112)
CO2 BLDA-SCNC: 30 MMOL/L (ref 32–48)
CO2 BLDA-SCNC: 31 MMOL/L (ref 32–48)
CO2 SERPL-SCNC: 20 MMOL/L (ref 20–33)
CO2 SERPL-SCNC: 25 MMOL/L (ref 20–33)
COLOR UR: ABNORMAL
CREAT SERPL-MCNC: 3.52 MG/DL (ref 0.5–1.4)
CREAT SERPL-MCNC: 4.06 MG/DL (ref 0.5–1.4)
DELSYS IDSYS: ABNORMAL
DELSYS IDSYS: ABNORMAL
EPITHELIAL CELLS 1715: ABNORMAL /HPF (ref 0–5)
ERYTHROCYTE [DISTWIDTH] IN BLOOD BY AUTOMATED COUNT: 66.3 FL (ref 35.9–50)
ERYTHROCYTE [DISTWIDTH] IN BLOOD BY AUTOMATED COUNT: 67.8 FL (ref 35.9–50)
GFR SERPLBLD CREATININE-BSD FMLA CKD-EPI: 15 ML/MIN/1.73 M 2
GFR SERPLBLD CREATININE-BSD FMLA CKD-EPI: 18 ML/MIN/1.73 M 2
GLUCOSE BLD STRIP.AUTO-MCNC: 112 MG/DL (ref 65–99)
GLUCOSE SERPL-MCNC: 110 MG/DL (ref 65–99)
GLUCOSE SERPL-MCNC: 116 MG/DL (ref 65–99)
GLUCOSE UR STRIP.AUTO-MCNC: NEGATIVE MG/DL
HBV E AB SERPL QL IA: NEGATIVE
HBV SURFACE AB SERPL IA-ACNC: <3.5 MIU/ML (ref 0–10)
HCO3 BLDA-SCNC: 28.2 MMOL/L (ref 21–28)
HCO3 BLDA-SCNC: 29 MMOL/L (ref 21–28)
HCO3 BLDA-SCNC: 29.3 MMOL/L (ref 21–28)
HCT VFR BLD AUTO: 33.2 % (ref 42–52)
HCT VFR BLD AUTO: 35.2 % (ref 42–52)
HGB BLD-MCNC: 10.3 G/DL (ref 14–18)
HGB BLD-MCNC: 9.7 G/DL (ref 14–18)
HOROWITZ INDEX BLDA+IHG-RTO: 148 MM[HG]
HOROWITZ INDEX BLDA+IHG-RTO: 175 MM[HG]
HYALINE CAST   1831: PRESENT /LPF
INHALED O2 FLOW RATE: 2 L/MIN
KETONES UR STRIP.AUTO-MCNC: ABNORMAL MG/DL
LACTATE BLD-SCNC: <0.3 MMOL/L (ref 0.5–2)
LACTATE SERPL-SCNC: 0.3 MMOL/L (ref 0.5–2)
LACTATE SERPL-SCNC: 0.5 MMOL/L (ref 0.5–2)
LEUKOCYTE ESTERASE UR QL STRIP.AUTO: NEGATIVE
MCH RBC QN AUTO: 29.7 PG (ref 27–33)
MCH RBC QN AUTO: 30.4 PG (ref 27–33)
MCHC RBC AUTO-ENTMCNC: 29.2 G/DL (ref 32.3–36.5)
MCHC RBC AUTO-ENTMCNC: 29.3 G/DL (ref 32.3–36.5)
MCV RBC AUTO: 101.4 FL (ref 81.4–97.8)
MCV RBC AUTO: 104.1 FL (ref 81.4–97.8)
MICRO URNS: ABNORMAL
NITRITE UR QL STRIP.AUTO: NEGATIVE
O2/TOTAL GAS SETTING VFR VENT: 40 %
O2/TOTAL GAS SETTING VFR VENT: 40 %
PCO2 BLDA: 113.3 MMHG (ref 32–48)
PCO2 BLDA: 47.8 MMHG (ref 32–48)
PCO2 BLDA: 65.3 MMHG (ref 32–48)
PCO2 TEMP ADJ BLDA: 112.3 MMHG (ref 32–48)
PCO2 TEMP ADJ BLDA: 64.7 MMHG (ref 32–48)
PH BLDA: 7.03 [PH] (ref 7.35–7.45)
PH BLDA: 7.24 [PH] (ref 7.35–7.45)
PH BLDA: 7.39 [PH] (ref 7.35–7.45)
PH TEMP ADJ BLDA: 7.03 [PH] (ref 7.35–7.45)
PH TEMP ADJ BLDA: 7.25 [PH] (ref 7.35–7.45)
PH UR STRIP.AUTO: 5 [PH] (ref 5–8)
PHOSPHATE SERPL-MCNC: 6.6 MG/DL (ref 2.5–4.5)
PLATELET # BLD AUTO: 129 K/UL (ref 164–446)
PLATELET # BLD AUTO: 138 K/UL (ref 164–446)
PMV BLD AUTO: 11.2 FL (ref 9–12.9)
PMV BLD AUTO: 11.4 FL (ref 9–12.9)
PO2 BLDA: 59 MMHG (ref 83–108)
PO2 BLDA: 70 MMHG (ref 83–108)
PO2 BLDA: 74.9 MMHG (ref 83–108)
PO2 TEMP ADJ BLDA: 58 MMHG (ref 83–108)
PO2 TEMP ADJ BLDA: 73.9 MMHG (ref 83–108)
POTASSIUM SERPL-SCNC: 4.7 MMOL/L (ref 3.6–5.5)
POTASSIUM SERPL-SCNC: 5.2 MMOL/L (ref 3.6–5.5)
PROT UR QL STRIP: 100 MG/DL
RBC # BLD AUTO: 3.19 M/UL (ref 4.7–6.1)
RBC # BLD AUTO: 3.47 M/UL (ref 4.7–6.1)
RBC # URNS HPF: ABNORMAL /HPF (ref 0–2)
RBC UR QL AUTO: NEGATIVE
SAO2 % BLDA: 85 % (ref 93–99)
SAO2 % BLDA: 91.9 % (ref 93–99)
SAO2 % BLDA: 93 % (ref 93–99)
SIGNIFICANT IND 70042: NORMAL
SIGNIFICANT IND 70042: NORMAL
SITE SITE: NORMAL
SITE SITE: NORMAL
SODIUM SERPL-SCNC: 137 MMOL/L (ref 135–145)
SODIUM SERPL-SCNC: 137 MMOL/L (ref 135–145)
SOURCE SOURCE: NORMAL
SOURCE SOURCE: NORMAL
SP GR UR STRIP.AUTO: 1.02
SPECIMEN DRAWN FROM PATIENT: ABNORMAL
SPECIMEN DRAWN FROM PATIENT: ABNORMAL
T4 FREE SERPL-MCNC: 0.83 NG/DL (ref 0.93–1.7)
TSH SERPL-ACNC: 19.6 UIU/ML (ref 0.35–5.5)
UROBILINOGEN UR STRIP.AUTO-MCNC: 1 EU/DL
VIT B12 SERPL-MCNC: 667 PG/ML (ref 211–911)
WBC # BLD AUTO: 11.7 K/UL (ref 4.8–10.8)
WBC # BLD AUTO: 9.7 K/UL (ref 4.8–10.8)
WBC #/AREA URNS HPF: ABNORMAL /HPF

## 2025-02-04 PROCEDURE — 82803 BLOOD GASES ANY COMBINATION: CPT

## 2025-02-04 PROCEDURE — 700101 HCHG RX REV CODE 250: Performed by: EMERGENCY MEDICINE

## 2025-02-04 PROCEDURE — 700102 HCHG RX REV CODE 250 W/ 637 OVERRIDE(OP): Performed by: INTERNAL MEDICINE

## 2025-02-04 PROCEDURE — 86706 HEP B SURFACE ANTIBODY: CPT

## 2025-02-04 PROCEDURE — 94660 CPAP INITIATION&MGMT: CPT

## 2025-02-04 PROCEDURE — 700111 HCHG RX REV CODE 636 W/ 250 OVERRIDE (IP)

## 2025-02-04 PROCEDURE — 70450 CT HEAD/BRAIN W/O DYE: CPT

## 2025-02-04 PROCEDURE — 700102 HCHG RX REV CODE 250 W/ 637 OVERRIDE(OP): Performed by: EMERGENCY MEDICINE

## 2025-02-04 PROCEDURE — 36600 WITHDRAWAL OF ARTERIAL BLOOD: CPT

## 2025-02-04 PROCEDURE — 99291 CRITICAL CARE FIRST HOUR: CPT | Performed by: STUDENT IN AN ORGANIZED HEALTH CARE EDUCATION/TRAINING PROGRAM

## 2025-02-04 PROCEDURE — 80069 RENAL FUNCTION PANEL: CPT

## 2025-02-04 PROCEDURE — 83605 ASSAY OF LACTIC ACID: CPT | Mod: 91

## 2025-02-04 PROCEDURE — 770022 HCHG ROOM/CARE - ICU (200)

## 2025-02-04 PROCEDURE — 86480 TB TEST CELL IMMUN MEASURE: CPT

## 2025-02-04 PROCEDURE — 700111 HCHG RX REV CODE 636 W/ 250 OVERRIDE (IP): Performed by: INTERNAL MEDICINE

## 2025-02-04 PROCEDURE — 82962 GLUCOSE BLOOD TEST: CPT

## 2025-02-04 PROCEDURE — A9270 NON-COVERED ITEM OR SERVICE: HCPCS | Performed by: INTERNAL MEDICINE

## 2025-02-04 PROCEDURE — 82607 VITAMIN B-12: CPT

## 2025-02-04 PROCEDURE — 85027 COMPLETE CBC AUTOMATED: CPT

## 2025-02-04 PROCEDURE — 94640 AIRWAY INHALATION TREATMENT: CPT

## 2025-02-04 PROCEDURE — 99291 CRITICAL CARE FIRST HOUR: CPT | Performed by: INTERNAL MEDICINE

## 2025-02-04 PROCEDURE — 700111 HCHG RX REV CODE 636 W/ 250 OVERRIDE (IP): Mod: JZ | Performed by: INTERNAL MEDICINE

## 2025-02-04 PROCEDURE — 90935 HEMODIALYSIS ONE EVALUATION: CPT

## 2025-02-04 PROCEDURE — 81001 URINALYSIS AUTO W/SCOPE: CPT

## 2025-02-04 PROCEDURE — A9270 NON-COVERED ITEM OR SERVICE: HCPCS | Performed by: EMERGENCY MEDICINE

## 2025-02-04 PROCEDURE — 82140 ASSAY OF AMMONIA: CPT

## 2025-02-04 PROCEDURE — 84439 ASSAY OF FREE THYROXINE: CPT

## 2025-02-04 PROCEDURE — 71045 X-RAY EXAM CHEST 1 VIEW: CPT

## 2025-02-04 PROCEDURE — 80048 BASIC METABOLIC PNL TOTAL CA: CPT

## 2025-02-04 PROCEDURE — 84443 ASSAY THYROID STIM HORMONE: CPT

## 2025-02-04 RX ORDER — LABETALOL HYDROCHLORIDE 5 MG/ML
10-20 INJECTION, SOLUTION INTRAVENOUS EVERY 4 HOURS PRN
Status: DISCONTINUED | OUTPATIENT
Start: 2025-02-04 | End: 2025-02-12 | Stop reason: HOSPADM

## 2025-02-04 RX ORDER — HYDRALAZINE HYDROCHLORIDE 20 MG/ML
10-20 INJECTION INTRAMUSCULAR; INTRAVENOUS EVERY 4 HOURS PRN
Status: DISCONTINUED | OUTPATIENT
Start: 2025-02-04 | End: 2025-02-12 | Stop reason: HOSPADM

## 2025-02-04 RX ORDER — LEVOTHYROXINE SODIUM 125 UG/1
125 TABLET ORAL
Status: DISCONTINUED | OUTPATIENT
Start: 2025-02-05 | End: 2025-02-12 | Stop reason: HOSPADM

## 2025-02-04 RX ORDER — HEPARIN SODIUM 1000 [USP'U]/ML
INJECTION, SOLUTION INTRAVENOUS; SUBCUTANEOUS
Status: COMPLETED
Start: 2025-02-04 | End: 2025-02-04

## 2025-02-04 RX ADMIN — HEPARIN SODIUM 5000 UNITS: 5000 INJECTION, SOLUTION INTRAVENOUS; SUBCUTANEOUS at 14:10

## 2025-02-04 RX ADMIN — HEPARIN SODIUM 1200 UNITS: 1000 INJECTION, SOLUTION INTRAVENOUS; SUBCUTANEOUS at 12:15

## 2025-02-04 RX ADMIN — LEVOTHYROXINE SODIUM 112 MCG: 0.11 TABLET ORAL at 04:46

## 2025-02-04 RX ADMIN — HEPARIN SODIUM 1200 UNITS: 1000 INJECTION, SOLUTION INTRAVENOUS; SUBCUTANEOUS at 17:15

## 2025-02-04 RX ADMIN — AMLODIPINE BESYLATE 10 MG: 10 TABLET ORAL at 04:46

## 2025-02-04 RX ADMIN — HEPARIN SODIUM 5000 UNITS: 5000 INJECTION, SOLUTION INTRAVENOUS; SUBCUTANEOUS at 21:56

## 2025-02-04 RX ADMIN — HYDRALAZINE HYDROCHLORIDE 10 MG: 20 INJECTION, SOLUTION INTRAMUSCULAR; INTRAVENOUS at 17:24

## 2025-02-04 RX ADMIN — ALBUTEROL SULFATE 2.5 MG: 2.5 SOLUTION RESPIRATORY (INHALATION) at 20:15

## 2025-02-04 RX ADMIN — CLOPIDOGREL BISULFATE 75 MG: 75 TABLET, FILM COATED ORAL at 04:46

## 2025-02-04 RX ADMIN — GABAPENTIN 300 MG: 300 CAPSULE ORAL at 04:45

## 2025-02-04 RX ADMIN — PRAMIPEXOLE DIHYDROCHLORIDE 0.5 MG: 0.5 TABLET ORAL at 04:45

## 2025-02-04 RX ADMIN — HEPARIN SODIUM 5000 UNITS: 5000 INJECTION, SOLUTION INTRAVENOUS; SUBCUTANEOUS at 04:46

## 2025-02-04 ASSESSMENT — PAIN DESCRIPTION - PAIN TYPE
TYPE: ACUTE PAIN

## 2025-02-04 ASSESSMENT — PULMONARY FUNCTION TESTS
EPAP_CMH2O: 8

## 2025-02-04 ASSESSMENT — ENCOUNTER SYMPTOMS
FEVER: 0
SHORTNESS OF BREATH: 0
CHILLS: 0

## 2025-02-04 ASSESSMENT — FIBROSIS 4 INDEX: FIB4 SCORE: 1.833333333333333333

## 2025-02-04 NOTE — PROGRESS NOTES
4 Eyes Skin Assessment Completed by HERMANN Jara and HERMANN Alvarenga.    Head WDL  Ears Redness, blanching  Nose Redness and Blanching  Mouth WDL  Neck WDL  Breast/Chest WDL  Shoulder Blades WDL  Spine Redness and Blanching  (R) Arm/Elbow/Hand Redness and Blanching  (L) Arm/Elbow/Hand Redness and Blanching  Abdomen Bruising  Groin WDL  Scrotum/Coccyx/Buttocks Redness and Blanching  (R) Leg Redness, Blanching, and Bruising  (L) Leg Redness, Blanching, and Bruising  (R) Heel/Foot/Toe Redness, Blanching, and Edema  (L) Heel/Foot/Toe Redness, Blanching, and Edema          Devices In Places Condom Cath      Interventions In Place Gray Ear Foams, Heel Mepilex, Heel Float Boots, Pillows, Q2 Turns, Low Air Loss Mattress, and Barrier Cream    Possible Skin Injury Yes    Pictures Uploaded Into Epic Yes  Wound Consult Placed Yes  RN Wound Prevention Protocol Ordered Yes

## 2025-02-04 NOTE — PROGRESS NOTES
3hr HD started @ 1153 and ended @ 1210 per Dr Lassiter and Dr Gonda , per Dr Gonda patient will need to transfer to ICU for higher level of care.Per report from PCN Juani Hernandez patient was cleared by provider to come down to dialysis room.Patient was non responsive upon arrival, open eyes to sternal rub,not following command. Tx started with Dr Lassiter @ bedside,VSS, tolerated short without any issue.O2 sat better from 91% upon arrival to 97% when switched to mask. RRT team who  was following patient  came down and transferred patient to [facility].Post tx V/S 131/57,97,20,98.2,97% @ 3L O2 mask.

## 2025-02-04 NOTE — PROGRESS NOTES
Assessing patient to move to chair for breakfast. Pt difficult to arouse, not following commands, opening eyes momentarily and then closing. Vitals stable. Rapid response, Hospitalist, Neph called to bedside.

## 2025-02-04 NOTE — PROGRESS NOTES
Critical Care Progress Note    Date of admission  1/29/2025    Chief Complaint  69 y.o. male SNF resident with HFpEF, COPD on 6L home oxygen, CKD4, AAA 5.5cm, who presented to [facility] with malaise, found to have acute hypercapneic respiratory failiure and THERESA with hyperkalemia.  He was transferred emergently.  HD line placed.  Empiric amp/sulbactam for RLL infiltrate.  No wheezing or apparent exacerbation of COPD.  BiPAP for acute hypercapneic respiratory failure.  Encephalopathy likely toxic/metabolic/hypercapnic.  CT head unrevealing.       Hospital Course  1/29 direct admit from [facility]  1/29 hemodialysis catheter placement, nephrology consulted with initiation of dialysis  1/30 transferred out of ICU, treated for pneumonia  2/1 agitated with insomnia, Precedex drip weaned off    Interval Problem Update  Reviewed last 24 hour events:   - I was consulted this afternoon for patient's worsening mentation, ABG showing uncompensated respiratory acidosis, planned dialysis   -Afebrile, hypertensive   -Leukocytosis, thrombocytopenia noted on CBC   -Normal lactic acid   -Abnormal thyroid panel found   -CT head without contrast showing no acute intracranial process   -Transferred back to ICU for BiPAP and emergent dialysis    Review of Systems  Review of Systems   Unable to perform ROS: Mental status change (limited due to mental status)      Vital Signs for last 24 hours   Temp:  [35.8 °C (96.5 °F)-36.8 °C (98.2 °F)] 36.8 °C (98.2 °F)  Pulse:  [72-85] 73  Resp:  [16-25] 22  BP: (129-166)/(50-85) 154/69  SpO2:  [89 %-96 %] 94 %    Respiratory Information for the last 24 hours   3 L/min nasal cannula    Physical Exam   Physical Exam  Vitals and nursing note reviewed.   Constitutional:       General: He is in acute distress.      Appearance: He is well-developed and overweight. He is ill-appearing.      Interventions: Nasal cannula in place.   HENT:      Head: Normocephalic and atraumatic.      Nose:  Nose normal.      Mouth/Throat:      Mouth: Mucous membranes are dry.      Pharynx: Oropharynx is clear. No oropharyngeal exudate.   Eyes:      General: No scleral icterus.     Conjunctiva/sclera: Conjunctivae normal.      Pupils: Pupils are equal, round, and reactive to light.   Neck:      Vascular: No JVD.   Cardiovascular:      Rate and Rhythm: Regular rhythm. Tachycardia present. Occasional Extrasystoles are present.     Chest Wall: PMI is displaced.      Pulses: Normal pulses.      Heart sounds: Normal heart sounds. No murmur heard.     Comments: Hypertensive, right tunneled hemodialysis catheter in place  Pulmonary:      Effort: Bradypnea and respiratory distress present. No accessory muscle usage.      Breath sounds: Decreased air movement present. No stridor. Examination of the right-lower field reveals rhonchi and rales. Examination of the left-lower field reveals rhonchi and rales. Rhonchi and rales present. No wheezing.   Abdominal:      General: Bowel sounds are normal. There is no distension.      Palpations: Abdomen is soft.      Tenderness: There is no abdominal tenderness. There is no guarding or rebound.   Genitourinary:     Comments: Reina catheter in place  Musculoskeletal:         General: No tenderness.      Cervical back: Neck supple. No rigidity or tenderness.      Right lower leg: Edema present.      Left lower leg: Edema present.   Skin:     General: Skin is warm and dry.      Capillary Refill: Capillary refill takes less than 2 seconds.      Coloration: Skin is not pale.   Neurological:      General: No focal deficit present.      Mental Status: He is lethargic and disoriented.      Comments: Withdraws to pain slowly, gag intact, eyes closed, nonverbal         Medications  Current Facility-Administered Medications   Medication Dose Route Frequency Provider Last Rate Last Admin    pramipexole (Mirapex) tablet 0.5 mg  0.5 mg Oral DAILY Rico Rios D.O.   0.5 mg at 02/04/25 7493     gabapentin (Neurontin) capsule 300 mg  300 mg Oral DAILY MISAEL AndersonO.   300 mg at 02/04/25 0445    amLODIPine (Norvasc) tablet 10 mg  10 mg Oral DAILY Rico Rios D.O.   10 mg at 02/04/25 0446    hydrALAZINE (Apresoline) injection 10 mg  10 mg Intravenous Q4HRS PRN Rico Rios D.O.        Respiratory Therapy Consult   Nebulization Continuous RT Nakul Triana M.D.        albuterol (Proventil) 2.5mg/0.5ml nebulizer solution 2.5 mg  2.5 mg Nebulization Q2HRS PRN (RT) Nakul Triana M.D.        atorvastatin (Lipitor) tablet 40 mg  40 mg Oral Q EVENING Julián Rodgers D.O.   40 mg at 02/03/25 1713    heparin injection 5,000 Units  5,000 Units Subcutaneous Q8HRS Bernardo Johnson Jr. D.OTate   5,000 Units at 02/04/25 0446    senna-docusate (Pericolace Or Senokot S) 8.6-50 MG per tablet 2 Tablet  2 Tablet Oral Q EVENING Bernardo Johnson Jr. D.O.   2 Tablet at 02/03/25 1713    And    polyethylene glycol/lytes (Miralax) Packet 1 Packet  1 Packet Oral QDAY PRN Bernardo Johnson Jr. D.OTate        NS (Bolus) 0.9 % infusion 250 mL  250 mL Intravenous DIALYSIS PRN Booker Eckert M.D.        clopidogrel (Plavix) tablet 75 mg  75 mg Oral DAILY Nakul Triana M.D.   75 mg at 02/04/25 0446    levothyroxine (Synthroid) tablet 112 mcg  112 mcg Oral AM ES Nakul Triana M.D.   112 mcg at 02/04/25 0446    heparin intracatheter (for DIALYSIS USE ONLY) 1,200 Units  1,200 Units Intracatheter DIALYSIS PRN Booker Eckert M.D.   1,200 Units at 02/01/25 1326    heparin intracatheter (for DIALYSIS USE ONLY) 1,200 Units  1,200 Units Intracatheter DIALYSIS PRN Booker Eckert M.D.   1,200 Units at 02/01/25 1326    heparin injection 500 Units  500 Units Intravenous DIALYSIS PRN Booker Eckert M.D.   500 Units at 01/29/25 2343       Fluids    Intake/Output Summary (Last 24 hours) at 2/4/2025 1148  Last data filed at 2/4/2025 0608  Gross per 24 hour   Intake 480 ml   Output 500 ml   Net -20 ml       Laboratory  Recent Labs      02/04/25  1053   [id number] 7.03*   [id number] 113.3*   [id number] 74.9*   [id number] 91.9*   ARTHCO3 29*   [id number] 2   ARTBE -4         Recent Labs     02/02/25  0501 02/03/25  0550 02/04/25  0257   SODIUM 140 136 137   POTASSIUM 4.2 4.3 4.7   CHLORIDE 104 104 103   CO2 25 25 25   BUN 21 26* 33*   CREATININE 2.41* 2.60* 3.52*   PHOSPHORUS  --  5.0* 6.6*   CALCIUM 8.5 8.8 9.1     Recent Labs     02/02/25  0501 02/03/25  0550 02/04/25  0257   ALTSGPT 9  --   --    ASTSGOT 11*  --   --    ALKPHOSPHAT 132*  --   --    TBILIRUBIN 0.4  --   --    GLUCOSE 91 93 110*     Recent Labs     02/02/25  0501 02/04/25  0257   WBC 7.6 9.7   NEUTSPOLYS 63.60  --    LYMPHOCYTES 13.80*  --    MONOCYTES 17.80*  --    EOSINOPHILS 3.00  --    BASOPHILS 0.40  --    ASTSGOT 11*  --    ALTSGPT 9  --    ALKPHOSPHAT 132*  --    TBILIRUBIN 0.4  --      Recent Labs     02/02/25  0501 02/04/25  0257   RBC 3.28* 3.47*   HEMOGLOBIN 10.1* 10.3*   HEMATOCRIT 32.5* 35.2*   PLATELETCT 146* 138*       Imaging  X-Ray:  I have personally reviewed the images and compared with prior images. and My impression is: Enlarged cardiac silhouette with right lower lobe consolidation/effusion and mild overall edema, right IJ hemodialysis catheter in good position  CT:    Reviewed    Assessment/Plan  * Hyperkalemia- (present on admission)  Assessment & Plan  Due to renal failure  Monitor with medical management as needed  Continuous telemetry monitoring  Dialysis today    Bacterial pneumonia- (present on admission)  Assessment & Plan  Status post antibiotic treatment  Monitor for risk of recurrence    Toxic metabolic encephalopathy  Assessment & Plan  Recurrent 2/4 due to hypercapnia  Rescue BiPAP monitoring ability protect airway with low threshold to intubate if worsens  Limit sedatives  Reorient and maintain sleep/wake cycle    Acute on chronic respiratory failure with hypoxia and hypercapnia (HCC)  Assessment & Plan  Baseline COPD on 6L home oxygen.   Resume rescue  BiPAP today  RT protocol  Titrate oxygen to goal SpO2 greater than 88%  ABG  Low threshold to intubate    Acute on chronic renal failure (HCC)  Assessment & Plan  Requiring hemodialysis emergently today  Nephrology consulted  Renally adjust medications  Monitor urine output, electrolytes, bicarb, fluid status closely    (HFpEF) heart failure with preserved ejection fraction (HCC)  Assessment & Plan  Continue hypertension management  Fluid removal with dialysis    Hypertension, essential- (present on admission)  Assessment & Plan  Continue amlodipine with as needed hydralazine    Abdominal aortic aneurysm (AAA) without rupture (HCC)- (present on admission)  Assessment & Plan  Outpatient follow-up, ~5.3cm.  It was 5.5cm in 12/2024 and 3.3cm in 2016.         VTE:  Heparin  Ulcer: Not Indicated  Lines: Central Line  Ongoing indication addressed and Reina Catheter  Ongoing indication addressed    I have performed a physical exam and reviewed and updated ROS and Plan today (2/4/2025). In review of yesterday's note (2/3/2025), there are no changes except as documented above.     Patient is critically ill today requiring active management of his recurrent hypoxic/hypercarbic respiratory failure with associated acute metabolic encephalopathy needing initiation of rescue BiPAP with low threshold to intubate if worsens. High risk of deterioration and worsening vital organ dysfunction and death without the above critical care interventions.    Discussed patient condition and risk of morbidity and/or mortality with Hospitalist, RN, RT, Pharmacy, Charge nurse / hot rounds, Patient, and nephrology.  Critical care time: 48 minutes.  No time overlap.  Procedures not included in this time.    Please note that this dictation was created using voice recognition software. I have made every reasonable attempt to correct obvious errors, but there may be errors of grammar and possibly content that I did not discover before finalizing the  note.

## 2025-02-04 NOTE — CODE DOCUMENTATION
Rapid RN to dialysis. Notified dialysis team and nephrologist that Dr. Gonda will come see patient.

## 2025-02-04 NOTE — CARE PLAN
The patient is Stable - Low risk of patient condition declining or worsening    Shift Goals  Clinical Goals: Pt to remain free from falls during the night shift  Patient Goals: Rest and comfort  Family Goals: DANICA    Progress made toward(s) clinical / shift goals:  Pt remained free from falls during the night    Patient is not progressing towards the following goals:

## 2025-02-04 NOTE — CARE PLAN
The patient is Stable - Low risk of patient condition declining or worsening    Shift Goals  Clinical Goals: moblity  Patient Goals: rest  Family Goals: DANICA    Progress made toward(s) clinical / shift goals:  sat up in chair/weakness. Dizziness. Bed/chair alarm on      Patient is not progressing towards the following goals:

## 2025-02-04 NOTE — PROGRESS NOTES
Hospital Medicine Daily Progress Note    Date of Service  2/4/2025    Chief Complaint  Pb Peters is a 69 y.o. male admitted 1/29/2025 with acute hypercapnic respiratory failure, acute on chronic kidney injury and hyperkalemia.    Hospital Course  69-year-old male with a history of COPD requiring 6 L nasal cannula baseline, chronic kidney disease stage IV, infrarenal abdominal aortic aneurysm presented from [facility] due to acute hypercapnic respiratory failure, acute on chronic kidney disease causing hyperkalemia.  Labs there showed a creatinine of 6.57 with potassium of 7.8.  Patient was treated for his hyperkalemia and transferred here for need for urgent hemodialysis.  Patient was also noted to have a pH of 7.1 with a pCO2 of 74 on VBG.  CT scan of his abdomen showed a stable infrarenal abdominal aortic aneurysm, chest portion did show right lower lobe infiltrate and bilateral effusions.    Interval Problem Update  2/4 vital stable  O2 has been titrated down to 3 L however still maintaining SpO2 greater than 92%.  This morning a rapid response alert was called and I responded promptly to bedside.  Patient is lethargic however does arouse to loud vocal stimulus and sternal rub.  Does follow some commands however otherwise is fairly flaccid extremities, nonfocal, no facial droop, no focal weakness appreciated on exam however is limited by cooperation/wakefulness  I have ordered stat CT head, CMP, CBC, lactic acid, encephalopathy workup including TSH/T4, ammonia, B12 etc.  ABG returned with pCO2 113, suspect hypercapnic respiratory failure as primary cause of his current encephalopathy, I have discussed with critical care Dr. Gonda, appreciate consult  Patient will likely need upgrade to ICU or IMCU for BiPAP to help reduce pCO2    I have discussed this patient's plan of care and discharge plan at IDT rounds today with Case Management, Nursing, Nursing leadership, and other  members of the IDT team.    Consultants/Specialty  nephrology    Code Status  Full Code    Disposition  The patient is not medically cleared for discharge to home or a post-acute facility.      I have placed the appropriate orders for post-discharge needs.    Review of Systems  Review of Systems   Unable to perform ROS: Mental acuity   Constitutional:  Positive for malaise/fatigue. Negative for chills and fever.   Eyes:         Blind   Respiratory:  Negative for shortness of breath.    Cardiovascular:  Negative for chest pain.        Physical Exam  Temp:  [35.8 °C (96.5 °F)-36.8 °C (98.2 °F)] 36.8 °C (98.2 °F)  Pulse:  [72-85] 73  Resp:  [16-25] 22  BP: (129-166)/(50-85) 154/69  SpO2:  [89 %-96 %] 94 %    Physical Exam  Vitals and nursing note reviewed.   Constitutional:       General: He is not in acute distress.     Appearance: He is well-developed. He is ill-appearing.   HENT:      Head: Normocephalic and atraumatic.      Mouth/Throat:      Pharynx: Oropharynx is clear.   Eyes:      Comments: Chronic blindness   Neck:      Trachea: No tracheal deviation.   Cardiovascular:      Rate and Rhythm: Normal rate and regular rhythm.      Heart sounds: Murmur heard.   Pulmonary:      Effort: Pulmonary effort is normal. No respiratory distress.      Breath sounds: Rales present. No wheezing.      Comments: On NC   Abdominal:      General: Abdomen is flat. There is no distension.      Palpations: Abdomen is soft.   Musculoskeletal:      Cervical back: Neck supple. No edema or erythema.      Right lower leg: Edema present.      Left lower leg: Edema present.   Skin:     General: Skin is warm and dry.      Findings: No rash.   Neurological:      Mental Status: He is alert.      Motor: Weakness present.      Comments: Awake but confused   Psychiatric:         Speech: Speech is delayed.         Behavior: Behavior is slowed.         Cognition and Memory: Cognition is impaired. Memory is impaired.         Fluids    Intake/Output  Summary (Last 24 hours) at 2/4/2025 1055  Last data filed at 2/4/2025 0608  Gross per 24 hour   Intake 480 ml   Output 500 ml   Net -20 ml        Laboratory  Recent Labs     02/02/25  0501 02/04/25  0257   WBC 7.6 9.7   RBC 3.28* 3.47*   HEMOGLOBIN 10.1* 10.3*   HEMATOCRIT 32.5* 35.2*   MCV 99.1* 101.4*   MCH 30.8 29.7   MCHC 31.1* 29.3*   RDW 64.5* 66.3*   PLATELETCT 146* 138*   MPV 11.2 11.2     Recent Labs     02/02/25  0501 02/03/25  0550 02/04/25  0257   SODIUM 140 136 137   POTASSIUM 4.2 4.3 4.7   CHLORIDE 104 104 103   CO2 25 25 25   GLUCOSE 91 93 110*   BUN 21 26* 33*   CREATININE 2.41* 2.60* 3.52*   CALCIUM 8.5 8.8 9.1                   Imaging  DX-CHEST-PORTABLE (1 VIEW)   Final Result      Right IJ catheter terminates in the region of the SVC. No pneumothorax.      Bibasilar opacities.      CT-HEAD W/O    (Results Pending)        Assessment/Plan  * Hyperkalemia- (present on admission)  Assessment & Plan  Secondary to acute on chronic kidney disease  Persistent, will need ongoing hemodialysis  Closely monitor on telemetry  Patient is at high risk of worsening and will need close monitoring in the IMCU due to ongoing hyperkalemia  Plan is for hemodialysis shortly, no need for treatment of his hyperkalemia  Repeat BMP in the morning  Now resolved with multiple days of hemodialysis  Repeat BMP in the morning    Bacterial pneumonia- (present on admission)  Assessment & Plan  Was a concern for bacterial pneumonia so patient was placed on antibiotics  Continue Unasyn  Normal procalcitonin  Finished course of antibiotics    Shock (HCC)  Assessment & Plan  Did have short episode of shock that did require Levophed however this has been off for over 24 hours and now blood pressure has increased  Restarting home antihypertensive    Toxic metabolic encephalopathy  Assessment & Plan  Patient continues to be encephalopathic  Likely uremic due to acute on chronic kidney disease  Also possibly secondary to  pneumonia  Continue Unasyn  Hemodialysis per nephrology  CT head showed nothing acute    1/31  Overnight associated with severe agitation  Patient was placed in restraints and Precedex drip was initiated    2/1  Agitation has improved yet patient remains confused  I am unsure what his baseline is  No need for Precedex drip or restraints    Chronic hypoxemic respiratory failure (HCC)  Assessment & Plan  Patient does have a history of COPD and does require 6 L of oxygen at baseline   CT chest showed changes consistent with right lower lobe pneumonia   Patient has been started on Unasyn and azithromycin   Patient did have acute worsening and was requiring BiPAP however now with good sats on 8-10 L  Patient continues to have decreased breath sounds, this is likely chronic, no need for steroids at this time    Currently on 5 L, unclear baseline different documentation 3 L and 6 L    Acute on chronic renal failure (HCC)  Assessment & Plan  Acute kidney injury on top on chronic kidney disease   Associated with severe hyperkalemia   Nephrology consulted, temporary dialysis catheter was placed and he was started on dialysis  Family was contacted and agreed with treatment plan.   Repeat BMP daily  Nephrology following to determine daily/long-term needs for dialysis.  If he needs dialysis on discharge he will need HD chair set up and permacath placed    Hypertension, essential- (present on admission)  Assessment & Plan  Initially, blood pressure was low  Has now markedly increased  Restart home amlodipine  Start as needed hydralazine  Adjust as needed    Abdominal aortic aneurysm (AAA) without rupture (HCC)- (present on admission)  Assessment & Plan  Continue outpatient follow-up   No acute worsening   Does appear at baseline it is around 5.5 cm     Patient is critically ill.   The patient continues to have: Encephalopathy, respiratory failure  The vital organ system that is affected is the: Pulmonary, CNS  If untreated there  is a high chance of deterioration into: Worsening encephalopathy, respiratory collapse  And eventually death.   The critical care that I am providing today is: Diagnostic workup, frequent reassessments, consultation with critical care  The critical that has been undertaken is medically complex.   There has been no overlap in critical care time.   Critical Care Time not including procedures: 46 minutes      VTE prophylaxis:    heparin ppx    I have performed a physical exam and reviewed and updated ROS and Plan today (2/4/2025). In review of yesterday's note (2/3/2025), there are no changes except as documented above.

## 2025-02-04 NOTE — CARE PLAN
The patient is Watcher - Medium risk of patient condition declining or worsening    Shift Goals  Clinical Goals: bipap, correct acidosis  Patient Goals: Rest and comfort  Family Goals: DANICA    Progress made toward(s) clinical / shift goals:  pt's cognition improving with bipap. Pt is currently A&Ox3. Dialysis at bedside.       Problem: Knowledge Deficit - Standard  Goal: Patient and family/care givers will demonstrate understanding of plan of care, disease process/condition, diagnostic tests and medications  Outcome: Progressing     Problem: Skin Integrity  Goal: Skin integrity is maintained or improved  Outcome: Progressing     Problem: Fall Risk  Goal: Patient will remain free from falls  Outcome: Progressing     Problem: Hemodynamics  Goal: Patient's hemodynamics, fluid balance and neurologic status will be stable or improve  Outcome: Progressing     Problem: Respiratory  Goal: Patient will achieve/maintain optimum respiratory ventilation and gas exchange  Outcome: Progressing     Problem: Pain - Standard  Goal: Alleviation of pain or a reduction in pain to the patient’s comfort goal  Outcome: Progressing       Patient is not progressing towards the following goals:

## 2025-02-04 NOTE — PROGRESS NOTES
Pt arrives to T908. Pt obtunded. BiPAP applied by RT per MD order.       4 Eyes Skin Assessment Completed by Rosie RN and HERMANN Tavares.    Head WDL  Ears Redness  Nose Scab septum  Mouth WDL  Neck WDL  Breast/Chest Scar  Shoulder Blades Redness and Blanching  Spine WDL  (R) Arm/Elbow/Hand Redness, Blanching, and Bruising  (L) Arm/Elbow/Hand Bruising  Abdomen Blanching  Groin WDL  Scrotum/Coccyx/Buttocks Redness, Non-Blanching, and Discoloration Bruising  (R) Leg WDL  (L) Leg WDL  (R) Heel/Foot/Toe Redness, Blanching, and Boggy, Cracked  (L) Heel/Foot/Toe Redness, Blanching, and Boggy, Cracked          Devices In Places ECG, Blood Pressure Cuff, Pulse Ox, SCD's, Central Line, Condom Cath, and Bipap      Interventions In Place Bipap Protecta-Gel, Q2 Turns, Low Air Loss Mattress, and ZFlo Pillow    Possible Skin Injury Yes    Pictures Uploaded Into Epic Yes  Wound Consult Placed Yes  RN Wound Prevention Protocol Ordered Yes

## 2025-02-04 NOTE — PROGRESS NOTES
[facility] -  PROGRESS NOTE               Author: Tarah Diana M.D. Date & Time: 2/4/2025  9:22 AM     HPI:  This is a 69-year-old male with past medical history of HFpEF, COPD on 6L NC at baseline, CKD stage IV with last Scr in the office of 2.3, infrarenal AAA, blindness who lives in a skilled nursing facility in [place].  Over the last several days has been more confused with generalized weakness.  He was sent to the ER at [facility] in [place] where he was found to be tachypneic with a normal heart rate and blood pressure.  Labs showed acute on chronic kidney disease with a creatinine of 6.57 and a potassium of 7.8.  He was given hyperkalemia protocol of IV calcium, albuterol and insulin.  He became mildly hypotensive therefore was given 2 L of NS and a potassium was repeated which was 7.6.  Calcium and albuterol were repeated.  Last VBG showed a pH of 7.1, pCO2 74, PaO2 80, bicarb 27, base excess -2.  A CT of the head was performed for his altered mental status which showed no acute abnormalities, CT of his abdomen showed no obstructing stone however atrophied kidneys and stable calcified infrarenal AAA, the chest portion of his CT abdomen did show a right lower lobe infiltrate and bilateral effusions and he has been given some for the possible pneumonia.  He was started on BiPAP to treat his hypercapnia.  He was also given IV Bicarbonate for his acidosis. Goals of care were discussed by the sending physician with his family who states that the patient wanted everything done including dialysis, CPR, intubation.He was flighted to [facility] for advanced care. Nephrology was consulted for THERESA/Hyperkalemia from [place] and notified by [facility] on arrival. He is currently on BiPAP in the ICU. He is currently hemodynamically stable but his BP is soft with systolics in the low 90s.  Scr is 6.19 here and Bicarb is 25. Rest of BMP is pending. Patient is not able to provide history.  This information is obtained from the medical record.      DAILY NEPHROLOGY SUMMARY:  1/29 - consult done, had HD  1/30 - K improved to 5.8, SBP 100s-130s, no UOP recorded, on BiPAP, answers some questions, slightly confused, no complaints  1/31 - tolerated HD, UF 2L, SBP 80s-120s this AM, K 5.3, UOP 1.695L, off BiPAP this AM, no new c/o, feels SOB and edema improved, no n/v/d, frustrated by NPO status (awaiting swallow eval)  2/01: Seen this Am sitting up In bed eating pancakes for breakfast. Doing well without complaints awaiting for HD this AM.   2/02: Had a fall overnight. He is sleeping in his bed this AM. Feels pretty tired from all the dialysis per patient but agreeable to anything that is recommended when asked about taking a break. He continues to make goo urine and had 1.3L UOP even with Hd and UF of 2.5L yesterday. Will hold today. Will hold off on standing orders and allow day team to reassess tomorrow for HD needs and monitor renal recovery.   2/03: NAEO, no complaints, sitting in chair at bedside, feels same as yesterday  2/04: Creatinine worsening today (3.52, up from 2.6). Repeat UA ordered however having poor UOP. Quite somnolent this morning; in setting of increasing creatinine with poor UOP and encephalopathy resuming dialysis due to possible uremic encephalopathy     REVIEW OF SYSTEMS:    10 point ROS reviewed and is as per HPI/daily summary or otherwise negative    PMH/PSH/SH/FH: Reviewed and unchanged since admission note  CURRENT MEDICATIONS: Reviewed from admission to present day    VS:  /51   Pulse 80   Temp 36.6 °C (97.9 °F) (Temporal)   Resp 20   Ht 1.829 m (6')   Wt 84.9 kg (187 lb 2.7 oz)   SpO2 93%   BMI 25.38 kg/m²     Physical Exam  Nursing note reviewed.   Constitutional:       Appearance: He is ill-appearing.      Comments: Somnolent    Eyes:      General: No scleral icterus.  Cardiovascular:      Rate and Rhythm: Normal rate.      Comments: No edema  Pulmonary:       Effort: Pulmonary effort is normal.   Abdominal:      General: There is no distension.      Tenderness: There is left CVA tenderness.   Musculoskeletal:         General: No deformity.   Skin:     Findings: No rash.   Neurological:      Comments: Arousable to sternal rub   Psychiatric:         Behavior: Behavior is cooperative.         FLUID BALANCE:  In: 600 [P.O.:600]  Out: 500     LABS:  Recent Labs     02/02/25  0501 02/03/25  0550 02/04/25  0257   SODIUM 140 136 137   POTASSIUM 4.2 4.3 4.7   CHLORIDE 104 104 103   CO2 25 25 25   GLUCOSE 91 93 110*   BUN 21 26* 33*   CREATININE 2.41* 2.60* 3.52*   CALCIUM 8.5 8.8 9.1     Recent Labs     02/02/25  0501 02/04/25  0257   WBC 7.6 9.7   RBC 3.28* 3.47*   HEMOGLOBIN 10.1* 10.3*   HEMATOCRIT 32.5* 35.2*   MCV 99.1* 101.4*   MCH 30.8 29.7   MCHC 31.1* 29.3*   RDW 64.5* 66.3*   PLATELETCT 146* 138*   MPV 11.2 11.2     IMPRESSION:  # THERESA-DD  - Etiology is unclear, but suspect ATN  - HD x 3 through 2/1  - SCr significantly uptrending with trial of holding dialysis 2/2-2/3, UOP non oliguric  # CKD Stage IV 2/2 HTN and Ischemic Nephropathy  # Acidosis, improved  # Hyperkalemia, Resolved  # Hypercapnea  # HTN  # Acute on chronic resp failure  - initial concern for PNA, on abx, blood cx NGTD  # Encephalopathy   -Concern for uremic encephalopathy though likely multifactorial   # Hypervolemia, Resolved  -Initially severely overloaded and resistant to diuretics , improved  -history of challenging to control volume  # CAD  # HFpEF  # AAA <4 cm  # HLD  # Hypothyroidism  # Hypoalbuminemia  # Anemia  - ferritin 313, %sat 28  - hgb goal 10-11, currently at goal  # CKD MBD  - Ca wnl phos wnl  - PTH 2/1/25= 187     PLAN:  - Resume HD today, plan for TTS schedule until UOP picks up, mentation improves. If no improvement in mentation will need alternative evaluation for encephalopathy   - Dose all meds per eGFR < 15  - Monitor I/Os  - Daily labs  - low salt diet, fluid restriction to 1.5L  per day once taking PO

## 2025-02-04 NOTE — PROGRESS NOTES
Assumed care of patient after receiving report from Estefani. Pt is sleeping. Call light within reach and bed is locked and in lowest position. Bed alarm on.

## 2025-02-04 NOTE — THERAPY
Occupational Therapy Contact Note    Patient Name: Pb Peters  Age:  69 y.o., Sex:  male  Medical Record #: [id number]  Today's Date: 2/4/2025    OT consult received. Per RN, pt is in dialysis and has had a decline and not appropriate for therapy today. Will hold and follow-up as appropriate.    Jeimy Hardy, OTR/L, CNS

## 2025-02-04 NOTE — PROGRESS NOTES
Assumed pt care with RN. Pt is A&OX4 and states he is in 0/10 pain and declines interventions at this time. Plan of care discussed, no further questions. Personal belongings and call light within reach, bed alarm on.

## 2025-02-05 ENCOUNTER — APPOINTMENT (OUTPATIENT)
Dept: RADIOLOGY | Facility: MEDICAL CENTER | Age: 70
DRG: 682 | End: 2025-02-05
Attending: INTERNAL MEDICINE
Payer: MEDICARE

## 2025-02-05 LAB
ANION GAP SERPL CALC-SCNC: 10 MMOL/L (ref 7–16)
ARTERIAL PATENCY WRIST A: ABNORMAL
BASE EXCESS BLDA CALC-SCNC: 3 MMOL/L (ref -4–3)
BODY TEMPERATURE: ABNORMAL DEGREES
BUN SERPL-MCNC: 22 MG/DL (ref 8–22)
CALCIUM SERPL-MCNC: 9 MG/DL (ref 8.5–10.5)
CHLORIDE SERPL-SCNC: 100 MMOL/L (ref 96–112)
CO2 BLDA-SCNC: 35 MMOL/L (ref 32–48)
CO2 SERPL-SCNC: 26 MMOL/L (ref 20–33)
CREAT SERPL-MCNC: 2.66 MG/DL (ref 0.5–1.4)
DELSYS IDSYS: ABNORMAL
EKG IMPRESSION: NORMAL
ERYTHROCYTE [DISTWIDTH] IN BLOOD BY AUTOMATED COUNT: 63.7 FL (ref 35.9–50)
GAMMA INTERFERON BACKGROUND BLD IA-ACNC: 0.02 IU/ML
GFR SERPLBLD CREATININE-BSD FMLA CKD-EPI: 25 ML/MIN/1.73 M 2
GLUCOSE SERPL-MCNC: 89 MG/DL (ref 65–99)
HCO3 BLDA-SCNC: 32.4 MMOL/L (ref 21–28)
HCT VFR BLD AUTO: 34 % (ref 42–52)
HGB BLD-MCNC: 10.3 G/DL (ref 14–18)
LACTATE BLD-SCNC: 0.3 MMOL/L (ref 0.5–2)
LACTATE SERPL-SCNC: 0.6 MMOL/L (ref 0.5–2)
LPM ILPM: 6 LPM
M TB IFN-G BLD-IMP: NEGATIVE
M TB IFN-G CD4+ BCKGRND COR BLD-ACNC: 0 IU/ML
MCH RBC QN AUTO: 30 PG (ref 27–33)
MCHC RBC AUTO-ENTMCNC: 30.3 G/DL (ref 32.3–36.5)
MCV RBC AUTO: 99.1 FL (ref 81.4–97.8)
MITOGEN IGNF BCKGRD COR BLD-ACNC: 7.6 IU/ML
PCO2 BLDA: 76.1 MMHG (ref 32–48)
PCO2 TEMP ADJ BLDA: 72.5 MMHG (ref 32–48)
PH BLDA: 7.24 [PH] (ref 7.35–7.45)
PH TEMP ADJ BLDA: 7.25 [PH] (ref 7.35–7.45)
PLATELET # BLD AUTO: 133 K/UL (ref 164–446)
PMV BLD AUTO: 10.8 FL (ref 9–12.9)
PO2 BLDA: 87 MMHG (ref 83–108)
PO2 TEMP ADJ BLDA: 81 MMHG (ref 83–108)
POTASSIUM SERPL-SCNC: 4.2 MMOL/L (ref 3.6–5.5)
QFT TB2 - NIL TBQ2: 0 IU/ML
RBC # BLD AUTO: 3.43 M/UL (ref 4.7–6.1)
SAO2 % BLDA: 94 % (ref 93–99)
SODIUM SERPL-SCNC: 136 MMOL/L (ref 135–145)
SPECIMEN DRAWN FROM PATIENT: ABNORMAL
TROPONIN T SERPL-MCNC: 62 NG/L (ref 6–19)
WBC # BLD AUTO: 9.4 K/UL (ref 4.8–10.8)

## 2025-02-05 PROCEDURE — A9270 NON-COVERED ITEM OR SERVICE: HCPCS | Performed by: INTERNAL MEDICINE

## 2025-02-05 PROCEDURE — 71045 X-RAY EXAM CHEST 1 VIEW: CPT

## 2025-02-05 PROCEDURE — 83605 ASSAY OF LACTIC ACID: CPT | Mod: 91

## 2025-02-05 PROCEDURE — 80048 BASIC METABOLIC PNL TOTAL CA: CPT

## 2025-02-05 PROCEDURE — 97602 WOUND(S) CARE NON-SELECTIVE: CPT

## 2025-02-05 PROCEDURE — 700111 HCHG RX REV CODE 636 W/ 250 OVERRIDE (IP)

## 2025-02-05 PROCEDURE — 700111 HCHG RX REV CODE 636 W/ 250 OVERRIDE (IP): Performed by: INTERNAL MEDICINE

## 2025-02-05 PROCEDURE — 93010 ELECTROCARDIOGRAM REPORT: CPT | Performed by: INTERNAL MEDICINE

## 2025-02-05 PROCEDURE — 700102 HCHG RX REV CODE 250 W/ 637 OVERRIDE(OP): Performed by: INTERNAL MEDICINE

## 2025-02-05 PROCEDURE — 94660 CPAP INITIATION&MGMT: CPT

## 2025-02-05 PROCEDURE — 770020 HCHG ROOM/CARE - TELE (206)

## 2025-02-05 PROCEDURE — 700102 HCHG RX REV CODE 250 W/ 637 OVERRIDE(OP): Performed by: STUDENT IN AN ORGANIZED HEALTH CARE EDUCATION/TRAINING PROGRAM

## 2025-02-05 PROCEDURE — 84484 ASSAY OF TROPONIN QUANT: CPT

## 2025-02-05 PROCEDURE — 700102 HCHG RX REV CODE 250 W/ 637 OVERRIDE(OP): Performed by: EMERGENCY MEDICINE

## 2025-02-05 PROCEDURE — A9270 NON-COVERED ITEM OR SERVICE: HCPCS | Performed by: STUDENT IN AN ORGANIZED HEALTH CARE EDUCATION/TRAINING PROGRAM

## 2025-02-05 PROCEDURE — 99291 CRITICAL CARE FIRST HOUR: CPT | Performed by: STUDENT IN AN ORGANIZED HEALTH CARE EDUCATION/TRAINING PROGRAM

## 2025-02-05 PROCEDURE — 36600 WITHDRAWAL OF ARTERIAL BLOOD: CPT

## 2025-02-05 PROCEDURE — 82803 BLOOD GASES ANY COMBINATION: CPT

## 2025-02-05 PROCEDURE — 93005 ELECTROCARDIOGRAM TRACING: CPT | Mod: TC | Performed by: NURSE PRACTITIONER

## 2025-02-05 PROCEDURE — 85027 COMPLETE CBC AUTOMATED: CPT

## 2025-02-05 PROCEDURE — A9270 NON-COVERED ITEM OR SERVICE: HCPCS | Performed by: EMERGENCY MEDICINE

## 2025-02-05 RX ORDER — ISOSORBIDE MONONITRATE 60 MG/1
240 TABLET, EXTENDED RELEASE ORAL EVERY MORNING
Status: DISCONTINUED | OUTPATIENT
Start: 2025-02-05 | End: 2025-02-12 | Stop reason: HOSPADM

## 2025-02-05 RX ORDER — GABAPENTIN 100 MG/1
200 CAPSULE ORAL DAILY
Status: DISCONTINUED | OUTPATIENT
Start: 2025-02-06 | End: 2025-02-12 | Stop reason: HOSPADM

## 2025-02-05 RX ORDER — HEPARIN SODIUM 1000 [USP'U]/ML
INJECTION, SOLUTION INTRAVENOUS; SUBCUTANEOUS
Status: COMPLETED
Start: 2025-02-05 | End: 2025-02-05

## 2025-02-05 RX ORDER — NITROGLYCERIN 0.4 MG/1
0.4 TABLET SUBLINGUAL
Status: DISCONTINUED | OUTPATIENT
Start: 2025-02-05 | End: 2025-02-12 | Stop reason: HOSPADM

## 2025-02-05 RX ADMIN — UMECLIDINIUM BROMIDE AND VILANTEROL TRIFENATATE 1 PUFF: 62.5; 25 POWDER RESPIRATORY (INHALATION) at 18:18

## 2025-02-05 RX ADMIN — NITROGLYCERIN 0.4 MG: 0.4 TABLET, ORALLY DISINTEGRATING SUBLINGUAL at 08:06

## 2025-02-05 RX ADMIN — HEPARIN SODIUM 500 UNITS: 1000 INJECTION, SOLUTION INTRAVENOUS; SUBCUTANEOUS at 13:43

## 2025-02-05 RX ADMIN — CLOPIDOGREL BISULFATE 75 MG: 75 TABLET, FILM COATED ORAL at 06:22

## 2025-02-05 RX ADMIN — PRAMIPEXOLE DIHYDROCHLORIDE 0.5 MG: 0.5 TABLET ORAL at 06:26

## 2025-02-05 RX ADMIN — HEPARIN SODIUM 5000 UNITS: 5000 INJECTION, SOLUTION INTRAVENOUS; SUBCUTANEOUS at 06:21

## 2025-02-05 RX ADMIN — AMLODIPINE BESYLATE 10 MG: 10 TABLET ORAL at 06:22

## 2025-02-05 RX ADMIN — GABAPENTIN 300 MG: 300 CAPSULE ORAL at 06:21

## 2025-02-05 RX ADMIN — HEPARIN SODIUM 5000 UNITS: 5000 INJECTION, SOLUTION INTRAVENOUS; SUBCUTANEOUS at 21:54

## 2025-02-05 RX ADMIN — HEPARIN SODIUM 5000 UNITS: 5000 INJECTION, SOLUTION INTRAVENOUS; SUBCUTANEOUS at 13:16

## 2025-02-05 RX ADMIN — ATORVASTATIN CALCIUM 40 MG: 40 TABLET, FILM COATED ORAL at 17:33

## 2025-02-05 RX ADMIN — NITROGLYCERIN 0.4 MG: 0.4 TABLET, ORALLY DISINTEGRATING SUBLINGUAL at 08:45

## 2025-02-05 RX ADMIN — HEPARIN SODIUM 1200 UNITS: 1000 INJECTION, SOLUTION INTRAVENOUS; SUBCUTANEOUS at 16:55

## 2025-02-05 RX ADMIN — LEVOTHYROXINE SODIUM 125 MCG: 0.12 TABLET ORAL at 06:22

## 2025-02-05 RX ADMIN — ISOSORBIDE MONONITRATE 240 MG: 60 TABLET, EXTENDED RELEASE ORAL at 09:19

## 2025-02-05 ASSESSMENT — PAIN DESCRIPTION - PAIN TYPE
TYPE: ACUTE PAIN

## 2025-02-05 ASSESSMENT — PULMONARY FUNCTION TESTS
EPAP_CMH2O: 8

## 2025-02-05 NOTE — CARE PLAN
Problem: Ventilation  Goal: Ability to achieve and maintain unassisted ventilation or tolerate decreased levels of ventilator support  Description: Target End Date:  4 days     Document on Vent flowsheet    1.  Support and monitor invasive and noninvasive mechanical ventilation  2.  Monitor ventilator weaning response  3.  Perform ventilator associated pneumonia prevention interventions  4.  Manage ventilation therapy by monitoring diagnostic test results  Outcome: Progressing

## 2025-02-05 NOTE — PROGRESS NOTES
[facility] -  PROGRESS NOTE               Author: Praneeth Lassiter M.D. Date & Time: 2/5/2025  10:50 AM     HPI:  This is a 69-year-old male with past medical history of HFpEF, COPD on 6L NC at baseline, CKD stage IV with last Scr in the office of 2.3, infrarenal AAA, blindness who lives in a skilled nursing facility in [place].  Over the last several days has been more confused with generalized weakness.  He was sent to the ER at [facility] in [place] where he was found to be tachypneic with a normal heart rate and blood pressure.  Labs showed acute on chronic kidney disease with a creatinine of 6.57 and a potassium of 7.8.  He was given hyperkalemia protocol of IV calcium, albuterol and insulin.  He became mildly hypotensive therefore was given 2 L of NS and a potassium was repeated which was 7.6.  Calcium and albuterol were repeated.  Last VBG showed a pH of 7.1, pCO2 74, PaO2 80, bicarb 27, base excess -2.  A CT of the head was performed for his altered mental status which showed no acute abnormalities, CT of his abdomen showed no obstructing stone however atrophied kidneys and stable calcified infrarenal AAA, the chest portion of his CT abdomen did show a right lower lobe infiltrate and bilateral effusions and he has been given some for the possible pneumonia.  He was started on BiPAP to treat his hypercapnia.  He was also given IV Bicarbonate for his acidosis. Goals of care were discussed by the sending physician with his family who states that the patient wanted everything done including dialysis, CPR, intubation.He was flighted to [facility] for advanced care. Nephrology was consulted for THERESA/Hyperkalemia from [place] and notified by [facility] on arrival. He is currently on BiPAP in the ICU. He is currently hemodynamically stable but his BP is soft with systolics in the low 90s.  Scr is 6.19 here and Bicarb is 25. Rest of BMP is pending. Patient is not able to provide history.  This information is obtained from the medical record.      DAILY NEPHROLOGY SUMMARY:  1/29 - consult done, had HD  1/30 - K improved to 5.8, SBP 100s-130s, no UOP recorded, on BiPAP, answers some questions, slightly confused, no complaints  1/31 - tolerated HD, UF 2L, SBP 80s-120s this AM, K 5.3, UOP 1.695L, off BiPAP this AM, no new c/o, feels SOB and edema improved, no n/v/d, frustrated by NPO status (awaiting swallow eval)  2/01: Seen this Am sitting up In bed eating pancakes for breakfast. Doing well without complaints awaiting for HD this AM.   2/02: Had a fall overnight. He is sleeping in his bed this AM. Feels pretty tired from all the dialysis per patient but agreeable to anything that is recommended when asked about taking a break. He continues to make goo urine and had 1.3L UOP even with Hd and UF of 2.5L yesterday. Will hold today. Will hold off on standing orders and allow day team to reassess tomorrow for HD needs and monitor renal recovery.   2/03: NAEO, no complaints, sitting in chair at bedside, feels same as yesterday  2/04: Creatinine worsening today (3.52, up from 2.6). Repeat UA ordered however having poor UOP. Quite somnolent this morning; in setting of increasing creatinine with poor UOP and encephalopathy resuming dialysis due to possible uremic encephalopathy   2/05: NAEO, avoided intubation, tolerated BiPAP; Woke up a bit after HD yesterday; [facility]ICU    REVIEW OF SYSTEMS:    10 point ROS reviewed and is as per HPI/daily summary or otherwise negative    PMH/PSH/SH/FH: Reviewed and unchanged since admission note  CURRENT MEDICATIONS: Reviewed from admission to present day    VS:  BP (!) 149/68   Pulse 78   Temp 36.4 °C (97.5 °F) (Temporal)   Resp (!) 26   Ht 1.829 m (6')   Wt 88.6 kg (195 lb 5.2 oz)   SpO2 98%   BMI 26.49 kg/m²     Physical Exam  Nursing note reviewed.   Constitutional:       General: He is sleeping.      Appearance: Normal appearance. He is ill-appearing.       Interventions: Face mask in place.   Eyes:      General: No scleral icterus.  Cardiovascular:      Comments: No edema  Pulmonary:      Effort: Pulmonary effort is normal.   Abdominal:      General: There is no distension.   Musculoskeletal:         General: No deformity.   Skin:     Findings: No rash.   Neurological:      Mental Status: He is easily aroused.   Psychiatric:         Behavior: Behavior is cooperative.         FLUID BALANCE:  No intake/output data recorded.    LABS:  Recent Labs     02/04/25  0257 02/04/25  1055 02/05/25  0418   SODIUM 137 137 136   POTASSIUM 4.7 5.2 4.2   CHLORIDE 103 103 100   CO2 25 20 26   GLUCOSE 110* 116* 89   BUN 33* 36* 22   CREATININE 3.52* 4.06* 2.66*   CALCIUM 9.1 9.0 9.0     Recent Labs     02/04/25  0257 02/04/25  1405 02/05/25  0418   WBC 9.7 11.7* 9.4   RBC 3.47* 3.19* 3.43*   HEMOGLOBIN 10.3* 9.7* 10.3*   HEMATOCRIT 35.2* 33.2* 34.0*   .4* 104.1* 99.1*   MCH 29.7 30.4 30.0   MCHC 29.3* 29.2* 30.3*   RDW 66.3* 67.8* 63.7*   PLATELETCT 138* 129* 133*   MPV 11.2 11.4 10.8     IMPRESSION:  # THERESA-DD  - Etiology is unclear, but suspect ATN  - HD x 3 through 2/1  - SCr significantly uptrending with trial of holding dialysis 2/2-2/3, UOP non oliguric  # CKD Stage IV 2/2 HTN and Ischemic Nephropathy  # Acidosis, improved  # Hyperkalemia, Resolved  # Hypercapnea  # HTN  # Acute on chronic resp failure  - initial concern for PNA, on abx, blood cx NGTD  # Encephalopathy   -Concern for uremic encephalopathy though likely multifactorial   # Hypervolemia, Resolved  -Initially severely overloaded and resistant to diuretics , improved  -history of challenging to control volume  # CAD  # HFpEF  # AAA <4 cm  # HLD  # Hypothyroidism  # Hypoalbuminemia  # Anemia  - ferritin 313, %sat 28  - hgb goal 10-11, currently at goal  # CKD MBD  - Ca wnl phos wnl  - PTH 2/1/25= 187     PLAN:  - TTS iHD  - UF as tolerated  - Extra iHD today for clearance and UF  - Dose all meds per eGFR < 15  -  Monitor I/Os  - Daily labs  - Low sodium diet

## 2025-02-05 NOTE — CARE PLAN
The patient is Stable - Low risk of patient condition declining or worsening    Shift Goals  Clinical Goals: maintain mental status, oxygenation, oain control  Patient Goals: pain  Family Goals: silvia    Progress made toward(s) clinical / shift goals:    Problem: Fall Risk  Goal: Patient will remain free from falls  Outcome: Progressing     Problem: Respiratory  Goal: Patient will achieve/maintain optimum respiratory ventilation and gas exchange  Outcome: Progressing     Problem: Pain - Standard  Goal: Alleviation of pain or a reduction in pain to the patient’s comfort goal  Outcome: Progressing       Patient is not progressing towards the following goals:

## 2025-02-05 NOTE — CARE PLAN
The patient is Stable - Low risk of patient condition declining or worsening    Shift Goals  Clinical Goals: improved ABG, hemodynamic stability, bipap  Patient Goals: rest  Family Goals: silvia    Progress made toward(s) clinical / shift goals:    Problem: Knowledge Deficit - Standard  Goal: Patient and family/care givers will demonstrate understanding of plan of care, disease process/condition, diagnostic tests and medications  Outcome: Progressing     Problem: Skin Integrity  Goal: Skin integrity is maintained or improved  Outcome: Progressing     Problem: Fall Risk  Goal: Patient will remain free from falls  Outcome: Progressing     Problem: Hemodynamics  Goal: Patient's hemodynamics, fluid balance and neurologic status will be stable or improve  Outcome: Progressing     Problem: Respiratory  Goal: Patient will achieve/maintain optimum respiratory ventilation and gas exchange  Outcome: Progressing     Problem: Pain - Standard  Goal: Alleviation of pain or a reduction in pain to the patient’s comfort goal  Outcome: Progressing       Patient is not progressing towards the following goals:

## 2025-02-05 NOTE — PROGRESS NOTES
[facility] Services Progress Note     Hemodialysis treatment  x 3 hours done today as ordered per Dr. Lassiter.  Treatment initiated at 1352 and ended at 1652.      PRE-TX : A&O 4 (-)CP, (+)SOB. generalized edema. Lung sounds dim. Accessed R IJ non tunneled trialysis, aspirated heparin prior to tx w/o issue. Tx initiated w/o incident    POST TX:  Tx completed as ordered, blood returned, no complications. VSS, d/c from machine per protocol.     Total HD time : 3hrs       See e-flow sheets for further details.     Net UF : 1000 mL     Post tx CVC care; R IJ Trialysis  ports cleansed per protocol, flushed with NS, locked with Heparin 1ml/1000 unit, clamped and capped. CVC dressing assessed, CDI. Aspirate Heparin prior to next CVC use.     Report given to primary RN.

## 2025-02-05 NOTE — PROGRESS NOTES
"Critical Care Progress Note    Date of admission  1/29/2025    Chief Complaint  69 y.o. male SNF resident with HFpEF, COPD on 6L home oxygen, CKD4, AAA 5.5cm, who presented to [facility] with malaise, found to have acute hypercapneic respiratory failiure and THERESA with hyperkalemia. He was transferred emergently. HD line placed. Empiric amp/sulbactam for RLL infiltrate. No wheezing or apparent exacerbation of COPD. BiPAP for acute hypercapneic respiratory failure. Encephalopathy likely toxic/metabolic/hypercapnic. CT head unrevealing.     Hospital Course  1/29 direct admit from [facility]  1/29 hemodialysis catheter placement, nephrology consulted with initiation of dialysis  1/30 transferred out of ICU, treated for pneumonia  2/1 agitated with insomnia, Precedex drip weaned off  2/4: admitted to ICU for ahhrf requiring bipap    Interval Problem Update  Reviewed last 24 hour events:  iHD witwh ultrafiltrate of 1.5L   Placed back on BiPAP      Review of Systems  Review of Systems   Constitutional:         \"I feel alright\"        Vital Signs for last 24 hours   Temp:  [36.2 °C (97.1 °F)-36.7 °C (98.1 °F)] 36.3 °C (97.3 °F)  Pulse:  [53-83] 79  Resp:  [16-40] 40  BP: (112-186)/(51-83) 150/69  SpO2:  [93 %-99 %] 99 %    Hemodynamic parameters for last 24 hours       Respiratory Information for the last 24 hours       Physical Exam   Physical Exam  Vitals and nursing note reviewed.   Constitutional:       General: He is in acute distress.      Appearance: He is overweight. He is ill-appearing.   Eyes:      General: Lids are normal.      Conjunctiva/sclera:      Right eye: Right conjunctiva is not injected.      Left eye: Left conjunctiva is not injected.   Cardiovascular:      Rate and Rhythm: Normal rate and regular rhythm.      Pulses: Normal pulses.      Heart sounds: S1 normal and S2 normal.   Pulmonary:      Breath sounds: Normal breath sounds. No wheezing, rhonchi or rales.   Abdominal:      " General: Abdomen is flat.      Palpations: Abdomen is soft.   Neurological:      Mental Status: He is lethargic.      Comments: Moves all extremities equally   Psychiatric:         Mood and Affect: Mood normal.         Speech: Speech normal.         Behavior: Behavior is agitated. Behavior is not aggressive.         Judgment: Judgment normal.         Medications  Current Facility-Administered Medications   Medication Dose Route Frequency Provider Last Rate Last Admin    isosorbide mononitrate SR (Imdur) tablet 240 mg  240 mg Oral QAM Fernando Kvng, D.O.   240 mg at 02/05/25 0919    nitroglycerin (Nitrostat) tablet 0.4 mg  0.4 mg Sublingual Q5 MIN PRN Fernnado Ellsworth, D.O.   0.4 mg at 02/05/25 0845    [START ON 2/6/2025] gabapentin (Neurontin) capsule 200 mg  200 mg Oral DAILY Fernando Kvng, D.O.        HEPARIN SODIUM (PORCINE) 1000 UNIT/ML INJ SOLN             umeclidinium-vilanterol (Anoro Ellipta) inhaler 1 Puff  1 Puff Inhalation DAILY Fernando Kvng, D.O.        levothyroxine (Synthroid) tablet 125 mcg  125 mcg Oral AM ES Jeremy M Gonda, M.D.   125 mcg at 02/05/25 0622    hydrALAZINE (Apresoline) injection 10-20 mg  10-20 mg Intravenous Q4HRS PRN Jeremy M Gonda, M.D.        labetalol (Normodyne/Trandate) injection 10-20 mg  10-20 mg Intravenous Q4HRS PRN Jeremy M Gonda, M.D.        pramipexole (Mirapex) tablet 0.5 mg  0.5 mg Oral DAILY Rico Rios D.O.   0.5 mg at 02/05/25 0626    amLODIPine (Norvasc) tablet 10 mg  10 mg Oral DAILY Rico Rios D.O.   10 mg at 02/05/25 0622    Respiratory Therapy Consult   Nebulization Continuous RT Nakul Triana M.D.        albuterol (Proventil) 2.5mg/0.5ml nebulizer solution 2.5 mg  2.5 mg Nebulization Q2HRS PRN (RT) Nakul Triana M.D.   2.5 mg at 02/04/25 2015    atorvastatin (Lipitor) tablet 40 mg  40 mg Oral Q EVENING MISAEL MenchacaOTate   40 mg at 02/03/25 1713    heparin injection 5,000 Units  5,000 Units Subcutaneous Q8HRS Bernardo Johnson Jr., D.O.   5,000 Units at  02/05/25 0621    senna-docusate (Pericolace Or Senokot S) 8.6-50 MG per tablet 2 Tablet  2 Tablet Oral Q EVENING Bernardo Johnson Jr., D.OTate   2 Tablet at 02/03/25 1713    And    polyethylene glycol/lytes (Miralax) Packet 1 Packet  1 Packet Oral QDAY PRN Bernardo Johnson Jr. D.OTate        NS (Bolus) 0.9 % infusion 250 mL  250 mL Intravenous DIALYSIS PRN Booker Eckert M.D.        clopidogrel (Plavix) tablet 75 mg  75 mg Oral DAILY Nakul Triana M.D.   75 mg at 02/05/25 0622    heparin intracatheter (for DIALYSIS USE ONLY) 1,200 Units  1,200 Units Intracatheter DIALYSIS PRCRISTIN Eckert M.D.   1,200 Units at 02/04/25 1215    heparin intracatheter (for DIALYSIS USE ONLY) 1,200 Units  1,200 Units Intracatheter DIALYSIS PRCRISTIN Eckert M.D.   1,200 Units at 02/04/25 1215    heparin injection 500 Units  500 Units Intravenous DIALYSIS PRN Booker Eckert M.D.   500 Units at 01/29/25 2343       Fluids    Intake/Output Summary (Last 24 hours) at 2/5/2025 1235  Last data filed at 2/5/2025 1200  Gross per 24 hour   Intake 480 ml   Output 0 ml   Net 480 ml       Laboratory  Recent Labs     02/04/25  1053 02/04/25  1510 02/04/25  1944 02/05/25  0922   ERQVF27L 7.03*  --   --   --    TRLSHZ595S 113.3*  --   --   --    KQOEI609F 74.9*  --   --   --    JWXQ0AKV 91.9*  --   --   --    ARTHCO3 29*  --   --   --    N1DJSBTUW 2  --   --   --    ARTBE -4  --   --   --    ISTATAPH  --  7.243* 7.394 7.237*   ISTATAPCO2  --  65.3* 47.8 76.1*   ISTATAPO2  --  59* 70* 87   ISTATATCO2  --  30* 31* 35   DSZUEDV2BOI  --  85* 93 94   ISTATARTHCO3  --  28.2* 29.3* 32.4*   ISTATARTBE  --  -1 4* 3   ISTATTEMP  --  36.8 C see below 96.6 F   ISTATFIO2  --  40 40  --    ISTATSPEC  --  Arterial Arterial Arterial   ISTATAPHTC  --  7.245*  --  7.253*   BYHTSDBX7JL  --  58*  --  81*         Recent Labs     02/03/25  0550 02/04/25  0257 02/04/25  1055 02/05/25  0418   SODIUM 136 137 137 136   POTASSIUM 4.3 4.7 5.2 4.2   CHLORIDE 104 103 103 100   CO2  25 25 20 26   BUN 26* 33* 36* 22   CREATININE 2.60* 3.52* 4.06* 2.66*   PHOSPHORUS 5.0* 6.6*  --   --    CALCIUM 8.8 9.1 9.0 9.0     Recent Labs     02/04/25  0257 02/04/25  1055 02/05/25  0418   GLUCOSE 110* 116* 89     Recent Labs     02/04/25  0257 02/04/25  1405 02/05/25  0418   WBC 9.7 11.7* 9.4     Recent Labs     02/04/25  0257 02/04/25  1405 02/05/25  0418   RBC 3.47* 3.19* 3.43*   HEMOGLOBIN 10.3* 9.7* 10.3*   HEMATOCRIT 35.2* 33.2* 34.0*   PLATELETCT 138* 129* 133*       Imaging  X-Ray:  I have personally reviewed the images and compared with prior images.    Assessment/Plan  * Hyperkalemia- (present on admission)  Assessment & Plan  Due to renal failure  Monitor with medical management as needed  Continuous telemetry monitoring  Dialysis per nephrology    Bacterial pneumonia- (present on admission)  Assessment & Plan  Status post antibiotic treatment  Monitor for risk of recurrence    Toxic metabolic encephalopathy  Assessment & Plan  Recurrent 2/4 due to hypercapnia  Rescue BiPAP monitoring ability protect airway with low threshold to intubate if worsens  Limit sedatives  Reorient and maintain sleep/wake cycle    (HFpEF) heart failure with preserved ejection fraction (HCC)  Assessment & Plan  BP management with Amlodipine, Imdur. Can add hydralazine if needed  Fluid removal with dialysis    Acute on chronic respiratory failure with hypoxia and hypercapnia (HCC)  Assessment & Plan  Baseline COPD on 6L home oxygen. Still requiring intermittent BiPAP.  Plan:  --restart home inhaler  --Albuterol PRN  --If continues to require BiPAP-->would trial short course of steroids and addition of buedsonide  --Renally adjust gabapentin as this might be altering his respiratory status    Acute on chronic renal failure (HCC)  Assessment & Plan  Estimated Creatinine Clearance: 28.8 mL/min (A) (by C-G formula based on SCr of 2.66 mg/dL (H)). Has temp RIJ catheter.  Plan:  --Nephrology following  --HD per  Nephrology    Hypertension, essential- (present on admission)  Assessment & Plan  Continue amlodipine, add imdur, and can add as needed hydralazine    Abdominal aortic aneurysm (AAA) without rupture (HCC)- (present on admission)  Assessment & Plan  Outpatient follow-up, ~5.3cm.  It was 5.5cm in 12/2024 and 3.3cm in 2016.         VTE:  Heparin  Ulcer: Not Indicated  Lines: Central Line  for HD    I have performed a physical exam and reviewed and updated ROS and Plan today (2/5/2025). In review of yesterday's note (2/4/2025), there are no changes except as documented above.     Discussed patient condition and risk of morbidity and/or mortality with RN, RT, Pharmacy, Charge nurse / hot rounds, Patient, and nephrology  The patient remains critically ill.  Critical care time = 60 minutes in directly providing and coordinating critical care and extensive data review.  No time overlap and excludes procedures.    Fernando Calderon DO MPH  Critical Care Medicine

## 2025-02-05 NOTE — PROGRESS NOTES
Hemodialysis done today, started @ 1414 and ended @ 1715 with net UF= 1500 ml.Patient TX  started with 3 K dialysate for 4.7 potassium, specimen sent pre tx  by ICU RN and result came back @ 1637, Dr Lassiter notified OK to run patient on 3K . Patient noted with higher BP at the start @ 160's and  dropped to 120's at the last hour of tx,Fluid removal rate decreased then BP starts to go up again @ the last 30 mins of tx, UF increased,Meds given per ICU RN. Report given to HERMANN Romano. See PATH flow sheet for details

## 2025-02-05 NOTE — WOUND TEAM
[facility]  Inpatient Services  Wound and Skin Care Brief Evaluation    Admission Date: 1/29/2025     Last order of IP CONSULT TO WOUND CARE was found on 2/4/2025 from Hospital Encounter on 1/29/2025     HPI, PMH, SH: Reviewed    No chief complaint on file.    Diagnosis: Hyperkalemia [E87.5]    Unit where seen by Wound Team: T908/01     Wound consult placed regarding nasal septum and coccyx. Chart and images reviewed. This discussed with bedside RN. This clinician in to assess patient. Patient pleasant and agreeable. Nasal septum with no wounds or discoloration, left open to air. Coccyx intact and with no open wounds as well.     No pressure injuries or advanced wound care needs identified. Wound consult completed. No further follow up unless indicated and consulted.          PREVENTATIVE INTERVENTIONS:    Q shift Mauricio - performed per nursing policy  Q shift pressure point assessments - performed per nursing policy    Surface/Positioning  ICU Low Airloss - Currently in Place  Reposition q 2 hours - Currently in Place    Offloading/Redistribution  Sacral offloading dressing (Silicone dressing) - Currently in Place  Heel offloading dressing (Silicone dressing) - Currently in Place      Containment/Moisture Prevention    Dri-cole pad - Currently in Place

## 2025-02-05 NOTE — HOSPITAL COURSE
1/29 direct admit from [facility]  1/29 hemodialysis catheter placement, nephrology consulted with initiation of dialysis  1/30 transferred out of ICU, treated for pneumonia  2/1 agitated with insomnia, Precedex drip weaned off  2/4: admitted to ICU for ahhrf requiring bipap

## 2025-02-05 NOTE — THERAPY
Speech Language Therapy Contact Note    Patient Name: Pb Peters  Age:  69 y.o., Sex:  male  Medical Record #: [id number]  Today's Date: 2/5/2025    Discussed missed therapy with RN       02/05/25 1303   Treatment Variance   Reason For Missed Therapy Medical - Patient Is Not Medically Stable   Interdisciplinary Plan of Care Collaboration   IDT Collaboration with  Nursing   Collaboration Comments Attempted to see patient for dysphagia management. Per RN, patient currently on BiPAP. Service will hold and re-attempt as able/medically appropriate.

## 2025-02-06 LAB
ANION GAP SERPL CALC-SCNC: 8 MMOL/L (ref 7–16)
BASE EXCESS BLDV CALC-SCNC: 3 MMOL/L (ref -2–3)
BODY TEMPERATURE: ABNORMAL DEGREES
BUN SERPL-MCNC: 23 MG/DL (ref 8–22)
CALCIUM SERPL-MCNC: 8.4 MG/DL (ref 8.5–10.5)
CHLORIDE SERPL-SCNC: 101 MMOL/L (ref 96–112)
CO2 BLDV-SCNC: 31 MMOL/L (ref 20–33)
CO2 SERPL-SCNC: 26 MMOL/L (ref 20–33)
CREAT SERPL-MCNC: 2.03 MG/DL (ref 0.5–1.4)
ERYTHROCYTE [DISTWIDTH] IN BLOOD BY AUTOMATED COUNT: 61.6 FL (ref 35.9–50)
GFR SERPLBLD CREATININE-BSD FMLA CKD-EPI: 35 ML/MIN/1.73 M 2
GLUCOSE SERPL-MCNC: 88 MG/DL (ref 65–99)
HCO3 BLDV-SCNC: 29.8 MMOL/L (ref 22–29)
HCT VFR BLD AUTO: 26.8 % (ref 42–52)
HGB BLD-MCNC: 8.3 G/DL (ref 14–18)
MCH RBC QN AUTO: 30.1 PG (ref 27–33)
MCHC RBC AUTO-ENTMCNC: 31 G/DL (ref 32.3–36.5)
MCV RBC AUTO: 97.1 FL (ref 81.4–97.8)
PCO2 BLDV: 53 MMHG (ref 38–54)
PCO2 TEMP ADJ BLDV: 52.2 MMHG (ref 38–54)
PH BLDV: 7.36 [PH] (ref 7.31–7.45)
PH TEMP ADJ BLDV: 7.36 [PH] (ref 7.31–7.45)
PLATELET # BLD AUTO: 114 K/UL (ref 164–446)
PMV BLD AUTO: 11.4 FL (ref 9–12.9)
PO2 BLDV: 42 MMHG (ref 23–48)
PO2 TEMP ADJ BLDV: 41 MMHG (ref 23–48)
POTASSIUM SERPL-SCNC: 3.7 MMOL/L (ref 3.6–5.5)
RBC # BLD AUTO: 2.76 M/UL (ref 4.7–6.1)
SAO2 % BLDV: 74 % (ref 60–85)
SODIUM SERPL-SCNC: 135 MMOL/L (ref 135–145)
SPECIMEN DRAWN FROM PATIENT: ABNORMAL
WBC # BLD AUTO: 7.9 K/UL (ref 4.8–10.8)

## 2025-02-06 PROCEDURE — 307059 PAD,EAR PROTECTOR: Performed by: STUDENT IN AN ORGANIZED HEALTH CARE EDUCATION/TRAINING PROGRAM

## 2025-02-06 PROCEDURE — 92526 ORAL FUNCTION THERAPY: CPT

## 2025-02-06 PROCEDURE — 700102 HCHG RX REV CODE 250 W/ 637 OVERRIDE(OP): Performed by: STUDENT IN AN ORGANIZED HEALTH CARE EDUCATION/TRAINING PROGRAM

## 2025-02-06 PROCEDURE — 700102 HCHG RX REV CODE 250 W/ 637 OVERRIDE(OP): Performed by: EMERGENCY MEDICINE

## 2025-02-06 PROCEDURE — A9270 NON-COVERED ITEM OR SERVICE: HCPCS | Performed by: STUDENT IN AN ORGANIZED HEALTH CARE EDUCATION/TRAINING PROGRAM

## 2025-02-06 PROCEDURE — A9270 NON-COVERED ITEM OR SERVICE: HCPCS | Performed by: INTERNAL MEDICINE

## 2025-02-06 PROCEDURE — 99232 SBSQ HOSP IP/OBS MODERATE 35: CPT | Performed by: HOSPITALIST

## 2025-02-06 PROCEDURE — A9270 NON-COVERED ITEM OR SERVICE: HCPCS | Performed by: EMERGENCY MEDICINE

## 2025-02-06 PROCEDURE — 700111 HCHG RX REV CODE 636 W/ 250 OVERRIDE (IP): Performed by: INTERNAL MEDICINE

## 2025-02-06 PROCEDURE — 80048 BASIC METABOLIC PNL TOTAL CA: CPT

## 2025-02-06 PROCEDURE — 85027 COMPLETE CBC AUTOMATED: CPT

## 2025-02-06 PROCEDURE — 90935 HEMODIALYSIS ONE EVALUATION: CPT

## 2025-02-06 PROCEDURE — 700102 HCHG RX REV CODE 250 W/ 637 OVERRIDE(OP): Performed by: INTERNAL MEDICINE

## 2025-02-06 PROCEDURE — 82803 BLOOD GASES ANY COMBINATION: CPT

## 2025-02-06 PROCEDURE — 94660 CPAP INITIATION&MGMT: CPT

## 2025-02-06 PROCEDURE — 770020 HCHG ROOM/CARE - TELE (206)

## 2025-02-06 RX ADMIN — ATORVASTATIN CALCIUM 40 MG: 40 TABLET, FILM COATED ORAL at 17:25

## 2025-02-06 RX ADMIN — PRAMIPEXOLE DIHYDROCHLORIDE 0.5 MG: 0.5 TABLET ORAL at 05:59

## 2025-02-06 RX ADMIN — AMLODIPINE BESYLATE 10 MG: 10 TABLET ORAL at 05:59

## 2025-02-06 RX ADMIN — CLOPIDOGREL BISULFATE 75 MG: 75 TABLET, FILM COATED ORAL at 06:00

## 2025-02-06 RX ADMIN — HEPARIN SODIUM 1200 UNITS: 1000 INJECTION, SOLUTION INTRAVENOUS; SUBCUTANEOUS at 14:30

## 2025-02-06 RX ADMIN — GABAPENTIN 200 MG: 100 CAPSULE ORAL at 05:59

## 2025-02-06 RX ADMIN — ISOSORBIDE MONONITRATE 240 MG: 60 TABLET, EXTENDED RELEASE ORAL at 05:59

## 2025-02-06 RX ADMIN — HEPARIN SODIUM 5000 UNITS: 5000 INJECTION, SOLUTION INTRAVENOUS; SUBCUTANEOUS at 21:14

## 2025-02-06 RX ADMIN — HEPARIN SODIUM 5000 UNITS: 5000 INJECTION, SOLUTION INTRAVENOUS; SUBCUTANEOUS at 13:44

## 2025-02-06 RX ADMIN — HEPARIN SODIUM 5000 UNITS: 5000 INJECTION, SOLUTION INTRAVENOUS; SUBCUTANEOUS at 05:59

## 2025-02-06 RX ADMIN — SENNOSIDES AND DOCUSATE SODIUM 2 TABLET: 50; 8.6 TABLET ORAL at 17:25

## 2025-02-06 RX ADMIN — UMECLIDINIUM BROMIDE AND VILANTEROL TRIFENATATE 1 PUFF: 62.5; 25 POWDER RESPIRATORY (INHALATION) at 06:00

## 2025-02-06 RX ADMIN — LEVOTHYROXINE SODIUM 125 MCG: 0.12 TABLET ORAL at 05:59

## 2025-02-06 ASSESSMENT — COGNITIVE AND FUNCTIONAL STATUS - GENERAL
SUGGESTED CMS G CODE MODIFIER MOBILITY: CK
TOILETING: A LOT
DRESSING REGULAR LOWER BODY CLOTHING: A LOT
EATING MEALS: A LOT
DAILY ACTIVITIY SCORE: 12
STANDING UP FROM CHAIR USING ARMS: A LITTLE
PERSONAL GROOMING: A LOT
WALKING IN HOSPITAL ROOM: A LOT
MOVING TO AND FROM BED TO CHAIR: A LOT
CLIMB 3 TO 5 STEPS WITH RAILING: A LOT
MOVING FROM LYING ON BACK TO SITTING ON SIDE OF FLAT BED: A LITTLE
TURNING FROM BACK TO SIDE WHILE IN FLAT BAD: A LITTLE
MOBILITY SCORE: 15
SUGGESTED CMS G CODE MODIFIER DAILY ACTIVITY: CL
HELP NEEDED FOR BATHING: A LOT
DRESSING REGULAR UPPER BODY CLOTHING: A LOT

## 2025-02-06 ASSESSMENT — ENCOUNTER SYMPTOMS
SHORTNESS OF BREATH: 0
DIARRHEA: 0
VOMITING: 0
LOSS OF CONSCIOUSNESS: 0
ABDOMINAL PAIN: 0
PALPITATIONS: 0
DIZZINESS: 0
COUGH: 0
CHILLS: 0
HEADACHES: 0
NAUSEA: 0
BACK PAIN: 0
FEVER: 0

## 2025-02-06 ASSESSMENT — PAIN DESCRIPTION - PAIN TYPE
TYPE: ACUTE PAIN

## 2025-02-06 ASSESSMENT — PATIENT HEALTH QUESTIONNAIRE - PHQ9
2. FEELING DOWN, DEPRESSED, IRRITABLE, OR HOPELESS: NOT AT ALL
SUM OF ALL RESPONSES TO PHQ9 QUESTIONS 1 AND 2: 0
1. LITTLE INTEREST OR PLEASURE IN DOING THINGS: NOT AT ALL

## 2025-02-06 ASSESSMENT — SOCIAL DETERMINANTS OF HEALTH (SDOH)

## 2025-02-06 ASSESSMENT — FIBROSIS 4 INDEX: FIB4 SCORE: 2.22

## 2025-02-06 NOTE — PROGRESS NOTES
Bedside report received from RN/tech: Lyn, assumed care of patient.     Fall Risk Score: HIGH RISK  Fall risk interventions in place: Place yellow fall risk ID band on patient, Provide patient/family education based on risk assessment, Educate patient/family to call staff for assistance when getting out of bed, Place fall precaution signage outside patient door, Place patient in room close to nursing station, Utilize bed/chair fall alarm, Notify charge of high risk for huddle, and Bed alarm connected correctly  Bed type: Regular (Mauricio Score less than 17 interventions in place)  Patient on cardiac monitor: Yes  IVF/IV medications: Not Applicable   Oxygen: How many liters 6L, Traced the line to wall oxygen, and No oxygen tank in room  Bedside sitter: Not Applicable   Isolation: Not applicable

## 2025-02-06 NOTE — PROGRESS NOTES
[facility] Services Progress Note     Hemodialysis treatment  x 3 hours done today as ordered per STAN Rosales.  Treatment initiated at 1120 and ended at 1420.      Pt A/Ox4, VSS, Pt denies CP or SOB, visually impaired, procedure explained, verbalized understanding. Pre-tx assessment done.    Pt tolerated tx well with no issues reported.    See e-flow sheets for further details.     Net UF : 2,000 mL     Post tx CVC care: R IJ non-tunneled HD ports cleansed per protocol, flushed with NS, locked with Heparin 1ml/1000 unit, clamped and capped. CVC dressing assessed, CDI. Aspirate Heparin prior to next CVC use.     Report given to primary RN.

## 2025-02-06 NOTE — CARE PLAN
The patient is Watcher - Medium risk of patient condition declining or worsening    Shift Goals  Clinical Goals: pulmonary hygiene, pain management  Patient Goals: rest  Family Goals: silvia    Progress made toward(s) clinical / shift goals:    Problem: Knowledge Deficit - Standard  Goal: Patient and family/care givers will demonstrate understanding of plan of care, disease process/condition, diagnostic tests and medications  Outcome: Progressing     Problem: Skin Integrity  Goal: Skin integrity is maintained or improved  Outcome: Progressing     Problem: Fall Risk  Goal: Patient will remain free from falls  Outcome: Progressing     Problem: Hemodynamics  Goal: Patient's hemodynamics, fluid balance and neurologic status will be stable or improve  Outcome: Progressing     Problem: Respiratory  Goal: Patient will achieve/maintain optimum respiratory ventilation and gas exchange  Outcome: Progressing     Problem: Pain - Standard  Goal: Alleviation of pain or a reduction in pain to the patient’s comfort goal  Outcome: Progressing       Patient is not progressing towards the following goals:

## 2025-02-06 NOTE — PROGRESS NOTES
4 Eyes Skin Assessment Completed by HERMANN Plasencia and HERMANN Ewing.    Head WDL  Ears Redness and Blanching  Nose WDL  Mouth WDL  Neck Discoloration  Breast/Chest Scar  Shoulder Blades Redness and Blanching  Spine Redness and Blanching  (R) Arm/Elbow/Hand Redness, Blanching, Bruising, and Abrasion  (L) Arm/Elbow/Hand Redness, Blanching, and Bruising  Abdomen Scar  Groin WDL  Scrotum/Coccyx/Buttocks Redness and Blanching  (R) Leg Redness, Blanching, Bruising, and Swelling  (L) Leg Redness, Blanching, Bruising, and Swelling  (R) Heel/Foot/Toe Redness, Blanching, Discoloration, Boggy, and Edema  (L) Heel/Foot/Toe Redness, Blanching, Discoloration, and Boggy          Devices In Places Tele Box, Pulse Ox, Central Line, Condom Cath, and Nasal Cannula      Interventions In Place Gray Ear Foams, NC W/Ear Foams, TAP System, Pillows, Barrier Cream, Dri-Esteban Pads, and Heels Loaded W/Pillows    Possible Skin Injury No    Pictures Uploaded Into Epic Yes  Wound Consult Placed N/A  RN Wound Prevention Protocol Ordered No , to be placed by bedside rn

## 2025-02-06 NOTE — PROGRESS NOTES
[facility] -  PROGRESS NOTE               Author: Tarah Diana M.D. Date & Time: 2/6/2025  9:27 AM     HPI:  This is a 69-year-old male with past medical history of HFpEF, COPD on 6L NC at baseline, CKD stage IV with last Scr in the office of 2.3, infrarenal AAA, blindness who lives in a skilled nursing facility in [place].  Over the last several days has been more confused with generalized weakness.  He was sent to the ER at [facility] in [place] where he was found to be tachypneic with a normal heart rate and blood pressure.  Labs showed acute on chronic kidney disease with a creatinine of 6.57 and a potassium of 7.8.  He was given hyperkalemia protocol of IV calcium, albuterol and insulin.  He became mildly hypotensive therefore was given 2 L of NS and a potassium was repeated which was 7.6.  Calcium and albuterol were repeated.  Last VBG showed a pH of 7.1, pCO2 74, PaO2 80, bicarb 27, base excess -2.  A CT of the head was performed for his altered mental status which showed no acute abnormalities, CT of his abdomen showed no obstructing stone however atrophied kidneys and stable calcified infrarenal AAA, the chest portion of his CT abdomen did show a right lower lobe infiltrate and bilateral effusions and he has been given some for the possible pneumonia.  He was started on BiPAP to treat his hypercapnia.  He was also given IV Bicarbonate for his acidosis. Goals of care were discussed by the sending physician with his family who states that the patient wanted everything done including dialysis, CPR, intubation.He was flighted to [facility] for advanced care. Nephrology was consulted for THERESA/Hyperkalemia from [place] and notified by [facility] on arrival. He is currently on BiPAP in the ICU. He is currently hemodynamically stable but his BP is soft with systolics in the low 90s.  Scr is 6.19 here and Bicarb is 25. Rest of BMP is pending. Patient is not able to provide history.  This information is obtained from the medical record.      DAILY NEPHROLOGY SUMMARY:  1/29 - consult done, had HD  1/30 - K improved to 5.8, SBP 100s-130s, no UOP recorded, on BiPAP, answers some questions, slightly confused, no complaints  1/31 - tolerated HD, UF 2L, SBP 80s-120s this AM, K 5.3, UOP 1.695L, off BiPAP this AM, no new c/o, feels SOB and edema improved, no n/v/d, frustrated by NPO status (awaiting swallow eval)  2/01: Seen this Am sitting up In bed eating pancakes for breakfast. Doing well without complaints awaiting for HD this AM.   2/02: Had a fall overnight. He is sleeping in his bed this AM. Feels pretty tired from all the dialysis per patient but agreeable to anything that is recommended when asked about taking a break. He continues to make goo urine and had 1.3L UOP even with Hd and UF of 2.5L yesterday. Will hold today. Will hold off on standing orders and allow day team to reassess tomorrow for HD needs and monitor renal recovery.   2/03: NAEO, no complaints, sitting in chair at bedside, feels same as yesterday  2/04: Creatinine worsening today (3.52, up from 2.6). Repeat UA ordered however having poor UOP. Quite somnolent this morning; in setting of increasing creatinine with poor UOP and encephalopathy resuming dialysis due to possible uremic encephalopathy   2/05: NAEO, avoided intubation, tolerated BiPAP; Woke up a bit after HD yesterday; TTICU  2/06: NAEO. Tolerated 3 hrs of UF yesterday, scheduled HD today due to oliguria. Creatinine approaching 2, though UOP documented as 250 cc last 24 hrs. Mentation much improved, patient feels well, no complaints. Transferred out of ICU to telemetry     REVIEW OF SYSTEMS:    10 point ROS reviewed and is as per HPI/daily summary or otherwise negative    PMH/PSH/SH/FH: Reviewed and unchanged since admission note  CURRENT MEDICATIONS: Reviewed from admission to present day    VS:  /51   Pulse 72   Temp 36.3 °C (97.3 °F) (Temporal)   Resp 17    Ht 1.829 m (6')   Wt 88.6 kg (195 lb 5.2 oz)   SpO2 96%   BMI 26.49 kg/m²     Physical Exam  Nursing note reviewed.   Constitutional:       Appearance: Normal appearance. He is ill-appearing. He is not toxic-appearing.      Interventions: Face mask in place.   Eyes:      General: No scleral icterus.  Cardiovascular:      Rate and Rhythm: Normal rate.      Comments: No edema  Pulmonary:      Effort: Pulmonary effort is normal.   Abdominal:      General: There is no distension.   Musculoskeletal:         General: No deformity.      Right lower leg: No edema.      Left lower leg: No edema.   Skin:     Findings: No rash.   Neurological:      Mental Status: He is alert and easily aroused.   Psychiatric:         Behavior: Behavior is cooperative.         FLUID BALANCE:  In: 1180 [P.O.:680; Dialysis:500]  Out: 1750     LABS:  Recent Labs     02/04/25  1055 02/05/25  0418 02/06/25  0424   SODIUM 137 136 135   POTASSIUM 5.2 4.2 3.7   CHLORIDE 103 100 101   CO2 20 26 26   GLUCOSE 116* 89 88   BUN 36* 22 23*   CREATININE 4.06* 2.66* 2.03*   CALCIUM 9.0 9.0 8.4*     Recent Labs     02/04/25  1405 02/05/25  0418 02/06/25  0424   WBC 11.7* 9.4 7.9   RBC 3.19* 3.43* 2.76*   HEMOGLOBIN 9.7* 10.3* 8.3*   HEMATOCRIT 33.2* 34.0* 26.8*   .1* 99.1* 97.1   MCH 30.4 30.0 30.1   MCHC 29.2* 30.3* 31.0*   RDW 67.8* 63.7* 61.6*   PLATELETCT 129* 133* 114*   MPV 11.4 10.8 11.4     IMPRESSION:  # THERESA-DD  - Etiology is unclear, but suspect ATN  - HD x 3 through 2/1  - SCr significantly uptrending with trial of holding dialysis 2/2-2/3 so HD resumed 2/4; UOP oliguric  # CKD Stage IV 2/2 HTN and Ischemic Nephropathy  # Acidosis, improved  # Hyperkalemia, Resolved  # Hypercapnea  # HTN  # Acute on chronic resp failure  - initial concern for PNA, on abx, blood cx NGTD  # Encephalopathy, resolved  -Concern for uremic encephalopathy though likely multifactorial given concomitant CO2 retention requiring BiPAP  # Hypervolemia,  Resolved  -Initially severely overloaded and resistant to diuretics , improved  -history of challenging to control volume  # CAD  # HFpEF  # AAA <4 cm  # HLD  # Hypothyroidism  # Hypoalbuminemia  # Anemia  - ferritin 313, %sat 28  - hgb goal 10-11, currently at goal  # CKD MBD  - Ca wnl phos wnl  - PTH 2/1/25= 187     PLAN:  - TTS iHD, reevaluate daily ongoing need based on UOP and renal function labs   - UF as tolerated  - Dose all meds per eGFR < 15  - Monitor I/Os  - Daily labs  - Low sodium diet

## 2025-02-06 NOTE — PROGRESS NOTES
Hospital Medicine Daily Progress Note    Date of Service  2/6/2025    Chief Complaint  Pb Peters is a 69 y.o. male admitted 1/29/2025 with altered, hypoxic    Hospital Course  68yo PMHx COPD, chronic hypoxic respiratory failure on 6 LNC baseline, HTN, DM, CKD IV, hypothyroidism, CAD.  Presented to an [facility] with hypercapnic resp failuure, pneumonia, THERESA and hyperkalemia.  Sent here for emergent HD    Interval Problem Update  On my examination pt states he feels good and has on complaints    I have discussed this patient's plan of care and discharge plan at IDT rounds today with Case Management, Nursing, Nursing leadership, and other members of the IDT team.    Consultants/Specialty  nephrology    Code Status  Full Code    Disposition  The patient is not medically cleared for discharge to home or a post-acute facility.      I have placed the appropriate orders for post-discharge needs.    Review of Systems  Review of Systems   Constitutional:  Negative for chills and fever.   Respiratory:  Negative for cough and shortness of breath.    Cardiovascular:  Negative for chest pain, palpitations and leg swelling.   Gastrointestinal:  Negative for abdominal pain, diarrhea, nausea and vomiting.   Genitourinary:  Negative for dysuria and urgency.   Musculoskeletal:  Negative for back pain.   Skin:  Negative for rash.   Neurological:  Negative for dizziness, loss of consciousness and headaches.        Physical Exam  Temp:  [36.1 °C (97 °F)-36.8 °C (98.2 °F)] 36.7 °C (98.1 °F)  Pulse:  [61-89] 86  Resp:  [12-34] 20  BP: ()/(44-76) 136/74  SpO2:  [92 %-98 %] 95 %    Physical Exam  Constitutional:       General: He is not in acute distress.     Appearance: He is well-developed. He is not diaphoretic.   HENT:      Head: Normocephalic and atraumatic.   Eyes:      Conjunctiva/sclera: Conjunctivae normal.   Neck:      Vascular: No JVD.   Cardiovascular:      Rate and Rhythm: Normal rate.      Heart  sounds: No murmur heard.     No gallop.   Pulmonary:      Effort: Pulmonary effort is normal. No respiratory distress.      Breath sounds: No stridor. No wheezing or rales.   Abdominal:      Palpations: Abdomen is soft.      Tenderness: There is no abdominal tenderness. There is no guarding or rebound.   Skin:     General: Skin is warm and dry.      Findings: No rash.   Neurological:      Mental Status: He is oriented to person, place, and time.   Psychiatric:         Thought Content: Thought content normal.         Fluids    Intake/Output Summary (Last 24 hours) at 2/6/2025 1519  Last data filed at 2/6/2025 1430  Gross per 24 hour   Intake 1200 ml   Output 4730 ml   Net -3530 ml        Laboratory  Recent Labs     02/04/25  1405 02/05/25  0418 02/06/25  0424   WBC 11.7* 9.4 7.9   RBC 3.19* 3.43* 2.76*   HEMOGLOBIN 9.7* 10.3* 8.3*   HEMATOCRIT 33.2* 34.0* 26.8*   .1* 99.1* 97.1   MCH 30.4 30.0 30.1   MCHC 29.2* 30.3* 31.0*   RDW 67.8* 63.7* 61.6*   PLATELETCT 129* 133* 114*   MPV 11.4 10.8 11.4     Recent Labs     02/04/25  1055 02/05/25  0418 02/06/25  0424   SODIUM 137 136 135   POTASSIUM 5.2 4.2 3.7   CHLORIDE 103 100 101   CO2 20 26 26   GLUCOSE 116* 89 88   BUN 36* 22 23*   CREATININE 4.06* 2.66* 2.03*   CALCIUM 9.0 9.0 8.4*                   Imaging  DX-CHEST-LIMITED (1 VIEW)   Final Result         1.  Pulmonary edema and/or infiltrates, stable since prior study.   2.  Trace bilateral pleural effusions   3.  Atherosclerosis      DX-CHEST-LIMITED (1 VIEW)   Final Result      1.  Right-sided temporary dialysis catheter terminates with the tip projecting over the expected location of the mid to proximal SVC.   2.  Small to moderate right and small left pleural effusions with associated compressive atelectasis and/or consolidation.      CT-HEAD W/O   Final Result      1.  Diffuse atrophy and white matter changes.   2.  No acute intracranial hemorrhage or territorial infarct.   3.  Small chronic infarcts.                DX-CHEST-PORTABLE (1 VIEW)   Final Result      Right IJ catheter terminates in the region of the SVC. No pneumothorax.      Bibasilar opacities.           Assessment/Plan  * Hyperkalemia- (present on admission)  Assessment & Plan  Secondary to THERESA/CKD  Now on iHD  Monitor daily    Bacterial pneumonia- (present on admission)  Assessment & Plan  Completed course of amp/sulbactam  Cultures neg    Shock ([facility])  Assessment & Plan  Did have short episode of shock that did require Levophed however this has been off for over 24 hours and now blood pressure has increased  Restarting home antihypertensive    Toxic metabolic encephalopathy  Assessment & Plan  Patient continues to be encephalopathic  Likely uremic due to acute on chronic kidney disease  Also possibly secondary to pneumonia  Continue Unasyn  Hemodialysis per nephrology  CT head showed nothing acute    1/31  Overnight associated with severe agitation  Patient was placed in restraints and Precedex drip was initiated    2/1  Agitation has improved yet patient remains confused  I am unsure what his baseline is  No need for Precedex drip or restraints    2/6  Mild confusion but overall much improved    Acute on chronic respiratory failure with hypoxia and hypercapnia ([facility])  Assessment & Plan  Patient does have a history of COPD and does require 6 L of oxygen at baseline   CT chest showed changes consistent with right lower lobe pneumonia   Patient has been started on Unasyn and azithromycin   Has improved now back to his baseline      Acute on chronic renal failure (HCC)  Assessment & Plan  CKD IV baseline now with THERESA likely ATN and requiring HD  Associated with severe hyperkalemia   Nephrology consulted, temporary dialysis catheter was placed  Making good UOP  Repeat BMP daily  Uncertain prognosis for renal recovery  Renally dose medications as appropriate    Hypertension, essential- (present on admission)  Assessment & Plan  On home amlodipine  As needed  hydralazine  Adjust as needed    Abdominal aortic aneurysm (AAA) without rupture (HCC)- (present on admission)  Assessment & Plan  Continue outpatient follow-up   No acute worsening   Does appear at baseline it is around 5.5 cm          VTE prophylaxis: VTE Selection    I have performed a physical exam and reviewed and updated ROS and Plan today (2/6/2025). In review of yesterday's note (2/5/2025), there are no changes except as documented above.

## 2025-02-06 NOTE — THERAPY
"Speech Language Pathology   Daily Treatment     Patient Name: Pb Peters  AGE:  69 y.o., SEX:  male  Medical Record #: [id number]  Date of Service: 2/6/2025      Precautions:  Precautions: Fall Risk         Subjective  RN cleared pt for swallowing therapy, reports no issues.   Pt was awake in bed, getting ready to start dialysis. He was agreeable to consuming PO with SLP, denies having any difficulty. He did clarify that he does have more trouble with dairy products because \"they coat my throat.\" He does not think he need any diet texture modifications.       Assessment  Pt consumed regular solids and thin liquids via straw. Pt could self-feed finger foods but needed assistance with the cup. Bolus acceptance and containment were intact. Mastication was timely and efficient with complete oral clearance. Pharyngeal swallow response appeared timely. No s/sx of airway invasion occurred with PO intake.     Clinical Impressions  Patient presents with a functional oropharyngeal swallow. Diet modification is not indicated. SLP will not actively follow. Please reconsult with any significant change in status and/or worsening swallow function.    Recommendations  Treatment Completed: Dysphagia Treatment     Dysphagia Treatment  Diet Consistency: Regular solids, thin liquids - finger foods  Instrumentation: None indicated at this time  Medication: As tolerated  Supervision: Assist with meal tray set up (1:1 feeding as needed d/t blindness)  Positioning: Fully upright and midline during oral intake, Meals sitting upright in a chair, as tolerated  Risk Management : Slow rate of intake, Physical mobility, as tolerated  Oral Care: BID      SLP Treatment Plan  Treatment Plan: None Indicated  SLP Frequency:  (N/A - D/C goals met)  Estimated Duration: Until Therapy Goals Met    Anticipated Discharge Needs  Discharge Recommendations: Anticipate that the patient will have no further speech therapy needs after discharge from " "the hospital  Therapy Recommendations Upon DC: Not Indicated      Patient / Family Goals  Patient / Family Goal #1: \"Just leave me alone\"  Goal #1 Outcome: Goal met  Short Term Goals  Short Term Goal # 1 B : Pt will consume rg/tn diet with no overt s/sx of aspiration or decline in pulmonary status  Goal Outcome  # 1 B: Goal met      Charisse Blankenship MS,CCC-SLP  "

## 2025-02-06 NOTE — CARE PLAN
The patient is Stable - Low risk of patient condition declining or worsening    Shift Goals  Clinical Goals: Dialysis  Patient Goals: help with meals  Family Goals: no family at bedside    Progress made toward(s) clinical / shift goals:    Problem: Knowledge Deficit - Standard  Goal: Patient and family/care givers will demonstrate understanding of plan of care, disease process/condition, diagnostic tests and medications  Outcome: Progressing     Problem: Skin Integrity  Goal: Skin integrity is maintained or improved  Outcome: Progressing     Problem: Fall Risk  Goal: Patient will remain free from falls  Outcome: Progressing     Problem: Hemodynamics  Goal: Patient's hemodynamics, fluid balance and neurologic status will be stable or improve  Outcome: Progressing     Problem: Pain - Standard  Goal: Alleviation of pain or a reduction in pain to the patient’s comfort goal  Outcome: Progressing       Patient is not progressing towards the following goals:

## 2025-02-07 LAB
ALBUMIN SERPL BCP-MCNC: 2.9 G/DL (ref 3.2–4.9)
BUN SERPL-MCNC: 19 MG/DL (ref 8–22)
CALCIUM ALBUM COR SERPL-MCNC: 9.4 MG/DL (ref 8.5–10.5)
CALCIUM SERPL-MCNC: 8.5 MG/DL (ref 8.5–10.5)
CHLORIDE SERPL-SCNC: 102 MMOL/L (ref 96–112)
CO2 SERPL-SCNC: 28 MMOL/L (ref 20–33)
CREAT SERPL-MCNC: 1.7 MG/DL (ref 0.5–1.4)
ERYTHROCYTE [DISTWIDTH] IN BLOOD BY AUTOMATED COUNT: 62.3 FL (ref 35.9–50)
GFR SERPLBLD CREATININE-BSD FMLA CKD-EPI: 43 ML/MIN/1.73 M 2
GLUCOSE SERPL-MCNC: 99 MG/DL (ref 65–99)
HCT VFR BLD AUTO: 28.3 % (ref 42–52)
HGB BLD-MCNC: 8.9 G/DL (ref 14–18)
MCH RBC QN AUTO: 30.3 PG (ref 27–33)
MCHC RBC AUTO-ENTMCNC: 31.4 G/DL (ref 32.3–36.5)
MCV RBC AUTO: 96.3 FL (ref 81.4–97.8)
PHOSPHATE SERPL-MCNC: 1.6 MG/DL (ref 2.5–4.5)
PLATELET # BLD AUTO: 121 K/UL (ref 164–446)
PMV BLD AUTO: 11.9 FL (ref 9–12.9)
POTASSIUM SERPL-SCNC: 3.7 MMOL/L (ref 3.6–5.5)
RBC # BLD AUTO: 2.94 M/UL (ref 4.7–6.1)
SODIUM SERPL-SCNC: 137 MMOL/L (ref 135–145)
WBC # BLD AUTO: 9.6 K/UL (ref 4.8–10.8)

## 2025-02-07 PROCEDURE — 99233 SBSQ HOSP IP/OBS HIGH 50: CPT | Performed by: HOSPITALIST

## 2025-02-07 PROCEDURE — A9270 NON-COVERED ITEM OR SERVICE: HCPCS | Performed by: INTERNAL MEDICINE

## 2025-02-07 PROCEDURE — 85027 COMPLETE CBC AUTOMATED: CPT

## 2025-02-07 PROCEDURE — 700102 HCHG RX REV CODE 250 W/ 637 OVERRIDE(OP): Performed by: INTERNAL MEDICINE

## 2025-02-07 PROCEDURE — 97166 OT EVAL MOD COMPLEX 45 MIN: CPT

## 2025-02-07 PROCEDURE — A9270 NON-COVERED ITEM OR SERVICE: HCPCS | Performed by: HOSPITALIST

## 2025-02-07 PROCEDURE — 700111 HCHG RX REV CODE 636 W/ 250 OVERRIDE (IP): Performed by: INTERNAL MEDICINE

## 2025-02-07 PROCEDURE — 770006 HCHG ROOM/CARE - MED/SURG/GYN SEMI*

## 2025-02-07 PROCEDURE — 80069 RENAL FUNCTION PANEL: CPT

## 2025-02-07 PROCEDURE — 700102 HCHG RX REV CODE 250 W/ 637 OVERRIDE(OP): Performed by: STUDENT IN AN ORGANIZED HEALTH CARE EDUCATION/TRAINING PROGRAM

## 2025-02-07 PROCEDURE — A9270 NON-COVERED ITEM OR SERVICE: HCPCS | Performed by: STUDENT IN AN ORGANIZED HEALTH CARE EDUCATION/TRAINING PROGRAM

## 2025-02-07 PROCEDURE — 700102 HCHG RX REV CODE 250 W/ 637 OVERRIDE(OP): Performed by: EMERGENCY MEDICINE

## 2025-02-07 PROCEDURE — 97162 PT EVAL MOD COMPLEX 30 MIN: CPT

## 2025-02-07 PROCEDURE — A9270 NON-COVERED ITEM OR SERVICE: HCPCS | Performed by: EMERGENCY MEDICINE

## 2025-02-07 PROCEDURE — 700102 HCHG RX REV CODE 250 W/ 637 OVERRIDE(OP): Performed by: HOSPITALIST

## 2025-02-07 RX ORDER — GUAIFENESIN 600 MG/1
600 TABLET, EXTENDED RELEASE ORAL EVERY 12 HOURS
Status: DISCONTINUED | OUTPATIENT
Start: 2025-02-07 | End: 2025-02-12 | Stop reason: HOSPADM

## 2025-02-07 RX ADMIN — CLOPIDOGREL BISULFATE 75 MG: 75 TABLET, FILM COATED ORAL at 06:10

## 2025-02-07 RX ADMIN — HEPARIN SODIUM 5000 UNITS: 5000 INJECTION, SOLUTION INTRAVENOUS; SUBCUTANEOUS at 06:11

## 2025-02-07 RX ADMIN — ATORVASTATIN CALCIUM 40 MG: 40 TABLET, FILM COATED ORAL at 18:26

## 2025-02-07 RX ADMIN — HEPARIN SODIUM 5000 UNITS: 5000 INJECTION, SOLUTION INTRAVENOUS; SUBCUTANEOUS at 15:38

## 2025-02-07 RX ADMIN — UMECLIDINIUM BROMIDE AND VILANTEROL TRIFENATATE 1 PUFF: 62.5; 25 POWDER RESPIRATORY (INHALATION) at 06:11

## 2025-02-07 RX ADMIN — ISOSORBIDE MONONITRATE 240 MG: 60 TABLET, EXTENDED RELEASE ORAL at 06:11

## 2025-02-07 RX ADMIN — GUAIFENESIN 600 MG: 600 TABLET, EXTENDED RELEASE ORAL at 15:38

## 2025-02-07 RX ADMIN — AMLODIPINE BESYLATE 10 MG: 10 TABLET ORAL at 06:10

## 2025-02-07 RX ADMIN — PRAMIPEXOLE DIHYDROCHLORIDE 0.5 MG: 0.5 TABLET ORAL at 06:10

## 2025-02-07 RX ADMIN — LEVOTHYROXINE SODIUM 125 MCG: 0.12 TABLET ORAL at 06:10

## 2025-02-07 RX ADMIN — HEPARIN SODIUM 5000 UNITS: 5000 INJECTION, SOLUTION INTRAVENOUS; SUBCUTANEOUS at 21:17

## 2025-02-07 RX ADMIN — GABAPENTIN 200 MG: 100 CAPSULE ORAL at 06:11

## 2025-02-07 RX ADMIN — SENNOSIDES AND DOCUSATE SODIUM 2 TABLET: 50; 8.6 TABLET ORAL at 18:26

## 2025-02-07 SDOH — ECONOMIC STABILITY: TRANSPORTATION INSECURITY
IN THE PAST 12 MONTHS, HAS THE LACK OF TRANSPORTATION KEPT YOU FROM MEDICAL APPOINTMENTS OR FROM GETTING MEDICATIONS?: NO

## 2025-02-07 SDOH — ECONOMIC STABILITY: TRANSPORTATION INSECURITY
IN THE PAST 12 MONTHS, HAS LACK OF RELIABLE TRANSPORTATION KEPT YOU FROM MEDICAL APPOINTMENTS, MEETINGS, WORK OR FROM GETTING THINGS NEEDED FOR DAILY LIVING?: NO

## 2025-02-07 ASSESSMENT — COGNITIVE AND FUNCTIONAL STATUS - GENERAL
MOBILITY SCORE: 17
MOVING FROM LYING ON BACK TO SITTING ON SIDE OF FLAT BED: A LITTLE
DRESSING REGULAR UPPER BODY CLOTHING: A LITTLE
TURNING FROM BACK TO SIDE WHILE IN FLAT BAD: A LITTLE
TOILETING: A LOT
DRESSING REGULAR LOWER BODY CLOTHING: A LOT
STANDING UP FROM CHAIR USING ARMS: A LITTLE
MOVING TO AND FROM BED TO CHAIR: A LITTLE
HELP NEEDED FOR BATHING: A LOT
SUGGESTED CMS G CODE MODIFIER DAILY ACTIVITY: CK
PERSONAL GROOMING: A LITTLE
DAILY ACTIVITIY SCORE: 15
SUGGESTED CMS G CODE MODIFIER MOBILITY: CK
CLIMB 3 TO 5 STEPS WITH RAILING: A LOT
WALKING IN HOSPITAL ROOM: A LITTLE
EATING MEALS: A LITTLE

## 2025-02-07 ASSESSMENT — ENCOUNTER SYMPTOMS
FEVER: 0
PALPITATIONS: 0
HEADACHES: 0
SHORTNESS OF BREATH: 0
DIARRHEA: 0
COUGH: 0
ABDOMINAL PAIN: 0
DIZZINESS: 0
BACK PAIN: 0
LOSS OF CONSCIOUSNESS: 0
NAUSEA: 0
CHILLS: 0
VOMITING: 0

## 2025-02-07 ASSESSMENT — GAIT ASSESSMENTS
GAIT LEVEL OF ASSIST: MINIMAL ASSIST
DEVIATION: BRADYKINETIC;SHUFFLED GAIT
ASSISTIVE DEVICE: HAND HELD ASSIST
DISTANCE (FEET): 3

## 2025-02-07 ASSESSMENT — SOCIAL DETERMINANTS OF HEALTH (SDOH)
IN THE PAST 12 MONTHS, HAS THE ELECTRIC, GAS, OIL, OR WATER COMPANY THREATENED TO SHUT OFF SERVICE IN YOUR HOME?: NO
WITHIN THE PAST 12 MONTHS, THE FOOD YOU BOUGHT JUST DIDN'T LAST AND YOU DIDN'T HAVE MONEY TO GET MORE: NEVER TRUE
WITHIN THE PAST 12 MONTHS, YOU WORRIED THAT YOUR FOOD WOULD RUN OUT BEFORE YOU GOT THE MONEY TO BUY MORE: NEVER TRUE

## 2025-02-07 ASSESSMENT — LIFESTYLE VARIABLES
ALCOHOL_USE: NO
ON A TYPICAL DAY WHEN YOU DRINK ALCOHOL HOW MANY DRINKS DO YOU HAVE: 0
TOTAL SCORE: 0
HOW MANY TIMES IN THE PAST YEAR HAVE YOU HAD 5 OR MORE DRINKS IN A DAY: 0
EVER FELT BAD OR GUILTY ABOUT YOUR DRINKING: NO
HAVE PEOPLE ANNOYED YOU BY CRITICIZING YOUR DRINKING: NO
TOTAL SCORE: 0
HAVE YOU EVER FELT YOU SHOULD CUT DOWN ON YOUR DRINKING: NO
EVER HAD A DRINK FIRST THING IN THE MORNING TO STEADY YOUR NERVES TO GET RID OF A HANGOVER: NO
AVERAGE NUMBER OF DAYS PER WEEK YOU HAVE A DRINK CONTAINING ALCOHOL: 0
TOTAL SCORE: 0
CONSUMPTION TOTAL: NEGATIVE

## 2025-02-07 ASSESSMENT — ACTIVITIES OF DAILY LIVING (ADL): TOILETING: REQUIRES ASSIST

## 2025-02-07 ASSESSMENT — FIBROSIS 4 INDEX
FIB4 SCORE: 2.090909090909090909
FIB4 SCORE: 2.22

## 2025-02-07 ASSESSMENT — PAIN DESCRIPTION - PAIN TYPE: TYPE: ACUTE PAIN

## 2025-02-07 NOTE — PROGRESS NOTES
[facility] -  PROGRESS NOTE               Author: Tarah Diana M.D. Date & Time: 2/7/2025  8:54 AM     HPI:  This is a 69-year-old male with past medical history of HFpEF, COPD on 6L NC at baseline, CKD stage IV with last Scr in the office of 2.3, infrarenal AAA, blindness who lives in a skilled nursing facility in [place].  Over the last several days has been more confused with generalized weakness.  He was sent to the ER at [facility] in [place] where he was found to be tachypneic with a normal heart rate and blood pressure.  Labs showed acute on chronic kidney disease with a creatinine of 6.57 and a potassium of 7.8.  He was given hyperkalemia protocol of IV calcium, albuterol and insulin.  He became mildly hypotensive therefore was given 2 L of NS and a potassium was repeated which was 7.6.  Calcium and albuterol were repeated.  Last VBG showed a pH of 7.1, pCO2 74, PaO2 80, bicarb 27, base excess -2.  A CT of the head was performed for his altered mental status which showed no acute abnormalities, CT of his abdomen showed no obstructing stone however atrophied kidneys and stable calcified infrarenal AAA, the chest portion of his CT abdomen did show a right lower lobe infiltrate and bilateral effusions and he has been given some for the possible pneumonia.  He was started on BiPAP to treat his hypercapnia.  He was also given IV Bicarbonate for his acidosis. Goals of care were discussed by the sending physician with his family who states that the patient wanted everything done including dialysis, CPR, intubation.He was flighted to [facility] for advanced care. Nephrology was consulted for THERESA/Hyperkalemia from [place] and notified by [facility] on arrival. He is currently on BiPAP in the ICU. He is currently hemodynamically stable but his BP is soft with systolics in the low 90s.  Scr is 6.19 here and Bicarb is 25. Rest of BMP is pending. Patient is not able to provide history.  This information is obtained from the medical record.      DAILY NEPHROLOGY SUMMARY:  1/29 - consult done, had HD  1/30 - K improved to 5.8, SBP 100s-130s, no UOP recorded, on BiPAP, answers some questions, slightly confused, no complaints  1/31 - tolerated HD, UF 2L, SBP 80s-120s this AM, K 5.3, UOP 1.695L, off BiPAP this AM, no new c/o, feels SOB and edema improved, no n/v/d, frustrated by NPO status (awaiting swallow eval)  2/01: Seen this Am sitting up In bed eating pancakes for breakfast. Doing well without complaints awaiting for HD this AM.   2/02: Had a fall overnight. He is sleeping in his bed this AM. Feels pretty tired from all the dialysis per patient but agreeable to anything that is recommended when asked about taking a break. He continues to make goo urine and had 1.3L UOP even with Hd and UF of 2.5L yesterday. Will hold today. Will hold off on standing orders and allow day team to reassess tomorrow for HD needs and monitor renal recovery.   2/03: NAEO, no complaints, sitting in chair at bedside, feels same as yesterday  2/04: Creatinine worsening today (3.52, up from 2.6). Repeat UA ordered however having poor UOP. Quite somnolent this morning; in setting of increasing creatinine with poor UOP and encephalopathy resuming dialysis due to possible uremic encephalopathy   2/05: NAEO, avoided intubation, tolerated BiPAP; Woke up a bit after HD yesterday; TTICU  2/06: NAEO. Tolerated 3 hrs of UF yesterday, scheduled HD today due to oliguria. Creatinine approaching 2, though UOP documented as 250 cc last 24 hrs. Mentation much improved, patient feels well, no complaints. Transferred out of ICU to telemetry   2/07: NAEO,  cc (0.3 cc/kg/hr). Dialysis yesterday (2L net UF). Cr showing good response to dialysis sessions. Complaining of difficult to clear sputum but breathing otherwise feels about the same.     REVIEW OF SYSTEMS:    10 point ROS reviewed and is as per HPI/daily summary or otherwise  negative    PMH/PSH/SH/FH: Reviewed and unchanged since admission note  CURRENT MEDICATIONS: Reviewed from admission to present day    VS:  BP (!) 145/64   Pulse 73   Temp 36.7 °C (98.1 °F) (Temporal)   Resp 16   Ht 1.829 m (6')   Wt 73.4 kg (161 lb 13.1 oz)   SpO2 96%   BMI 21.95 kg/m²     Physical Exam  Nursing note reviewed.   Constitutional:       Appearance: Normal appearance. He is ill-appearing. He is not toxic-appearing.      Interventions: Face mask in place.   Eyes:      General: No scleral icterus.  Cardiovascular:      Rate and Rhythm: Normal rate.      Comments: No edema  Pulmonary:      Effort: Pulmonary effort is normal.   Abdominal:      General: There is no distension.   Musculoskeletal:         General: No deformity.      Right lower leg: No edema.      Left lower leg: No edema.   Skin:     Findings: No rash.   Neurological:      Mental Status: He is alert and easily aroused.   Psychiatric:         Behavior: Behavior is cooperative.       FLUID BALANCE:  In: 1380 [P.O.:880; Dialysis:500]  Out: 2980     LABS:  Recent Labs     02/05/25  0418 02/06/25  0424 02/07/25  0351   SODIUM 136 135 137   POTASSIUM 4.2 3.7 3.7   CHLORIDE 100 101 102   CO2 26 26 28   GLUCOSE 89 88 99   BUN 22 23* 19   CREATININE 2.66* 2.03* 1.70*   CALCIUM 9.0 8.4* 8.5     Recent Labs     02/05/25  0418 02/06/25  0424 02/07/25  0351   WBC 9.4 7.9 9.6   RBC 3.43* 2.76* 2.94*   HEMOGLOBIN 10.3* 8.3* 8.9*   HEMATOCRIT 34.0* 26.8* 28.3*   MCV 99.1* 97.1 96.3   MCH 30.0 30.1 30.3   MCHC 30.3* 31.0* 31.4*   RDW 63.7* 61.6* 62.3*   PLATELETCT 133* 114* 121*   MPV 10.8 11.4 11.9     IMPRESSION:  # THERESA-DD  - Etiology is unclear, but suspect ATN  - HD x 3 through 2/1  - SCr significantly uptrending with trial of holding dialysis 2/2-2/3 so HD resumed 2/4; UOP oliguric  # CKD Stage IV 2/2 HTN and Ischemic Nephropathy  # Acidosis, improved  # Hyperkalemia, Resolved  # Hypercapnea  # HTN  # Acute on chronic resp failure  - initial  concern for PNA, on abx, blood cx NGTD  # Encephalopathy, resolved  -Concern for uremic encephalopathy though likely multifactorial given concomitant CO2 retention requiring BiPAP  # Hypervolemia, Resolved  -Initially severely overloaded and resistant to diuretics , improved  -history of challenging to control volume  # CAD  # HFpEF  # AAA <4 cm  # HLD  # Hypothyroidism  # Hypoalbuminemia  # Anemia  - ferritin 313, %sat 28  - hgb goal 10-11, currently at goal  # CKD MBD  - Ca wnl phos wnl  - PTH 2/1/25= 187     PLAN:  - TTS iHD, reevaluate daily ongoing need based on UOP and renal function labs (will trial dialysis-free day today 2/7 and reevaluate tomorrow pending UOP, pre-dialysis labs)  - UF as tolerated  - Dose all meds per eGFR < 15  - Monitor I/Os  - Daily labs  - Low sodium diet

## 2025-02-07 NOTE — THERAPY
"Physical Therapy   Initial Evaluation     Patient Name: Pb Peters  Age:  69 y.o., Sex:  male  Medical Record #: [id number]  Today's Date: 2/7/2025     Precautions  Precautions: Fall Risk  Comments: blindness    Assessment  Patient is a 69 y.o. male who was admitted with altered mental status and hypoxia. Pt diagnosed with hyperkalemia, bacterial pneumonia, metabolic encephalopathy, acute on chronic renal failure requiring HD. PMH significant for HTN, AAA, CKD, DM, CAD, COPD on baseline 6L.    Pt received in bed and agreeable to PT evaluation. Pt resides at [facility] in [place] per chart, and pt reports living there for ~1 year. Pt uses a wheelchair, requires 1P assist for transfers, and declines to ambulate due to fear of falling. Pt required overall min A to complete bed mobility, STS, take a few steps, and transfer to a chair. Pt appears to be at his baseline level of function, which he is in agreement with. No further acute PT needs at this time. Recommend return to prior facility or new facility with staff assist.    Plan    Physical Therapy Initial Treatment Plan   Duration: Discharge Needs Only    DC Equipment Recommendations: None  Discharge Recommendations:  (Return to prior facility)     Subjective    \"I don't like to walk because I don't want to fall again\"     Objective       02/07/25 1005   Precautions   Precautions Fall Risk   Comments blindness   Vitals   Pulse Oximetry 95 %   O2 (LPM) 6   O2 Delivery Device Silicone Nasal Cannula   Vitals Comments no c/o SOB during session   Pain 0 - 10 Group   Therapist Pain Assessment Post Activity Pain Same as Prior to Activity;Nurse Notified;0   Prior Living Situation   Housing / Facility Skilled Nursing Facility   Equipment Owned Wheelchair   Lives with - Patient's Self Care Capacity Attendant / Paid Care Giver   Comments Reports has been living at \"[facility]\" for ~1 year.   Prior Level of Functional Mobility   Bed Mobility Required Assist "   Transfer Status Required Assist   Ambulation Distance reports does not like to walk   Wheelchair Required Assist   Comments Pt reports he receives typically 1P assist for transfers to a wheelchair. Reports he is fearful of walking due to prior falls, so declines to.   History of Falls   History of Falls Yes   Date of Last Fall   (reports fall ~1 yr ago at the SNF)   Cognition    Level of Consciousness Alert   Comments Pleasant and cooperative. Self-limiting with mobility.   Passive ROM Lower Body   Passive ROM Lower Body WDL   Active ROM Lower Body    Active ROM Lower Body  WDL   Strength Lower Body   Comments BLE grossly 4/5   Sensation Lower Body   Comments Denies LE sensory changes   Lower Body Muscle Tone   Lower Body Muscle Tone  WDL   Coordination Lower Body    Coordination Lower Body  WDL   Balance Assessment   Sitting Balance (Static) Fair   Sitting Balance (Dynamic) Fair   Standing Balance (Static) Fair -   Standing Balance (Dynamic) Poor +   Weight Shift Sitting Fair   Weight Shift Standing Fair   Comments HHA for transfer   Bed Mobility    Supine to Sit Supervised   Scooting Supervised   Rolling Supervised   Comments HOB elevated   Gait Analysis   Gait Level Of Assist Minimal Assist   Assistive Device Hand Held Assist   Distance (Feet) 3   # of Times Distance was Traveled 1   Deviation Bradykinetic;Shuffled Gait   Comments B HHA, cues for sequencing due to blindness, declined to walk further   Functional Mobility   Sit to Stand Minimal Assist   Bed, Chair, Wheelchair Transfer Minimal Assist   Transfer Method Stand Step   Mobility up to chair   Comments HHA   6 Clicks Assessment - How much HELP from from another person do you currently need... (If the patient hasn't done an activity recently, how much help from another person do you think he/she would need if he/she tried?)   Turning from your back to your side while in a flat bed without using bedrails? 3   Moving from lying on your back to sitting on  the side of a flat bed without using bedrails? 3   Moving to and from a bed to a chair (including a wheelchair)? 3   Standing up from a chair using your arms (e.g., wheelchair, or bedside chair)? 3   Walking in hospital room? 3   Climbing 3-5 steps with a railing? 2   6 clicks Mobility Score 17   Education Group   Education Provided Role of Physical Therapist   Role of Physical Therapist Patient Response Patient;Acceptance;Explanation;Verbal Demonstration   Physical Therapy Initial Treatment Plan    Duration Discharge Needs Only   Anticipated Discharge Equipment and Recommendations   DC Equipment Recommendations None   Discharge Recommendations   (Return to prior facility)   Interdisciplinary Plan of Care Collaboration   IDT Collaboration with  Nursing;Occupational Therapist   Patient Position at End of Therapy Seated;Chair Alarm On;Call Light within Reach;Tray Table within Reach;Phone within Reach   Collaboration Comments RN updated   Session Information   Date / Session Number  2/7-dc needs     Patient seen for team evaluation with Occupational Therapist for the following reason(s):  Due to the medical complexity, the skill of both practitioners is needed to monitor vitals, patient status, and adjust the intervention to fit the patient's needs and goals.
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
## 2025-02-07 NOTE — THERAPY
Occupational Therapy   Initial Evaluation     Patient Name: Pb Petesr  Age:  69 y.o., Sex:  male  Medical Record #: [id number]  Today's Date: 2/7/2025     Precautions  Precautions: Fall Risk  Comments: blindness    Assessment    Patient is 69 y.o. male admitted with AMS and hypoxia. Pt found to have hyperkalemia, bacterial PNA, metabolic encephalopathy, and acute on chronic renal failure requiring HD. Other pertinent medical history includes HTN, AAA, CKD, DM, CAD, and COPD on 6L at baseline. Pt seen for OT evaluation. Pt required min A for ADL transfer, supv to don/doff gown, and supv to wipe hands/face while seated. Pt has been in [facility] for about one year where he has assist as needed for ADLs/IADLs. Pt appears to be functioning close to his baseline for ADLs. Patient will not be actively followed for occupational therapy services at this time, however may be seen if requested by physician for 1 more visit within 30 days to address any discharge or equipment needs.      Plan    Occupational Therapy Initial Treatment Plan   Duration: Discharge Needs Only    DC Equipment Recommendations: None  Discharge Recommendations:  (Return to SNF)      Objective     02/07/25 1006   Prior Living Situation   Prior Services None   Housing / Facility Skilled Nursing Facility   Bathroom Set up Walk In Shower;Shower Chair;Grab Bars   Equipment Owned Wheelchair   Lives with - Patient's Self Care Capacity Attendant / Paid Care Giver   Comments Pt has been in a SNF for about 1 year.   Prior Level of ADL Function   Self Feeding Independent   Grooming / Hygiene Independent   Bathing Requires Assist   Dressing Requires Assist   Toileting Requires Assist   Prior Level of IADL Function   Medication Management Dependent   Laundry Dependent   Home Management Dependent   Shopping Dependent   Prior Level Of Mobility Independent With Device in Home  (uses w/c)   Driving / Transportation Relatives / Others Provide  "Transportation   History of Falls   History of Falls Yes   Date of Last Fall   (Pt reports a fall in the SNF due to \"a bad dream\".)   Precautions   Precautions Fall Risk   Comments blindness   Pain   Pain Scales 0 to 10 Scale    Pain 0 - 10 Group   Therapist Pain Assessment Post Activity Pain Same as Prior to Activity;Nurse Notified  (Not rated, agreeable to session)   Cognition    Cognition / Consciousness WDL   Level of Consciousness Alert   Comments   (Pleasant and cooperative)   Passive ROM Upper Body   Comments WFL from observation   Active ROM Upper Body   Comments WFL from observation   Strength Upper Body   Upper Body Strength  WDL   Upper Body Muscle Tone   Upper Body Muscle Tone  WDL   Coordination Upper Body   Comments WFL from observation   Balance Assessment   Sitting Balance (Static) Fair   Sitting Balance (Dynamic) Fair   Standing Balance (Static) Fair -   Standing Balance (Dynamic) Poor +   Weight Shift Sitting Fair   Weight Shift Standing Fair   Comments HHA   Bed Mobility    Supine to Sit Supervised   Sit to Supine   (up to chair post)   Scooting Supervised   Rolling Supervised   Comments HOB elevated   ADL Assessment   Grooming Supervision;Seated  (washed face)   Upper Body Dressing Supervision  (don/doff gown)   Functional Mobility   Sit to Stand Minimal Assist   Bed, Chair, Wheelchair Transfer Minimal Assist   Transfer Method Stand Step   Mobility EOB>chair   Comments w/ HHA, cues due to blindness   Visual Perception   Visual Perception    (legally blind at baseline, reported a history of glaucoma and cataracts since around 2012)   Activity Tolerance   Sitting in Chair up to chair post   Sitting Edge of Bed <5 min   Standing <1-2 min total   Comments functional for eval   Education Group   Education Provided Role of Occupational Therapist;Pathology of bedrest   Role of Occupational Therapist Patient Response Patient;Acceptance;Explanation;Verbal Demonstration   Pathology of Bedrest Patient " Response Patient;Acceptance;Demonstration;Explanation;Verbal Demonstration;Action Demonstration     Patient seen for team evaluation with Physical Therapist for the following reason(s):  Patient required 2 person assistance for safety and to provide effective interventions. Each discipline assisted patient with appropriate and separate goals. Occupational Therapist facilitated ADL participation and assessment of additional factors required for functional performance while Physical Therapist simultaneously treated pt according to POC.

## 2025-02-07 NOTE — PROGRESS NOTES
Bedside report received from off going RN/tech: Iraida, assumed care of patient.     Fall Risk Score: HIGH RISK  Fall risk interventions in place: Place yellow fall risk ID band on patient, Provide patient/family education based on risk assessment, Educate patient/family to call staff for assistance when getting out of bed, Place fall precaution signage outside patient door, Place patient in room close to nursing station, Utilize bed/chair fall alarm, Notify charge of high risk for huddle, and Bed alarm connected correctly  Bed type: Regular (Mauricio Score less than 17 interventions in place)  Patient on cardiac monitor: Yes  IVF/IV medications: Not Applicable   Oxygen: How many liters 6L, Traced the line to wall oxygen, and No oxygen tank in room  Bedside sitter: Not Applicable   Isolation: Not applicable

## 2025-02-07 NOTE — PROGRESS NOTES
Monitor Summary     Rhythm: SR (2.7 sec pause)  Heart Rate: 74-84  Ectopy: F PAC, F PVC, R Coup, R Trigem  Measurement: .14/.06/.36

## 2025-02-07 NOTE — PROGRESS NOTES
Patient transferred to [facility] 615-2. Report called to HERMANN Tavares. Daughter called and notified of move. All patient belongings gathered. Patient transferred via wheelchair by transport.

## 2025-02-07 NOTE — PROGRESS NOTES
Hospital Medicine Daily Progress Note    Date of Service  2/7/2025    Chief Complaint  Pb Peters is a 69 y.o. male admitted 1/29/2025 with altered, hypoxic    Hospital Course  70yo PMHx COPD, chronic hypoxic respiratory failure on 6 LNC baseline, HTN, DM, CKD IV, hypothyroidism, CAD.  Presented to an [facility] with hypercapnic resp failuure, pneumonia, THERESA and hyperkalemia.  Sent here for emergent HD    Interval Problem Update  On my examination pt states he feels good and has on complaints        2/7 patient is new to me today, patient is alert oriented follows commands, he is on 6 L of oxygen which is his baseline, renal function test reviewed and electrolytes are stable, discussed with nephrology holding off on dialysis today, follow-up renal function test in a.m., continue close monitoring, continue respiratory care, PT OT pending, patient probably will be able to return to skilled nursing facility when medically stable, still assessing the need for dialysis as outpatient, monitoring urine output        I have discussed this patient's plan of care and discharge plan at IDT rounds today with Case Management, Nursing, Nursing leadership, and other members of the IDT team.    Consultants/Specialty  nephrology    Code Status  Full Code    Disposition  The patient is not medically cleared for discharge to home or a post-acute facility.      I have placed the appropriate orders for post-discharge needs.    Review of Systems  Review of Systems   Constitutional:  Negative for chills and fever.   Respiratory:  Negative for cough and shortness of breath.    Cardiovascular:  Negative for chest pain, palpitations and leg swelling.   Gastrointestinal:  Negative for abdominal pain, diarrhea, nausea and vomiting.   Genitourinary:  Negative for dysuria and urgency.   Musculoskeletal:  Negative for back pain.   Skin:  Negative for rash.   Neurological:  Negative for dizziness, loss of consciousness and  headaches.        Physical Exam  Temp:  [36.7 °C (98.1 °F)-37.1 °C (98.7 °F)] 36.7 °C (98.1 °F)  Pulse:  [73-86] 73  Resp:  [16-20] 16  BP: (111-145)/(64-74) 145/64  SpO2:  [94 %-97 %] 95 %    Physical Exam  Constitutional:       General: He is not in acute distress.     Appearance: He is well-developed. He is not diaphoretic.   HENT:      Head: Normocephalic and atraumatic.   Eyes:      Conjunctiva/sclera: Conjunctivae normal.   Neck:      Vascular: No JVD.   Cardiovascular:      Rate and Rhythm: Normal rate.      Heart sounds: No murmur heard.     No gallop.   Pulmonary:      Effort: Pulmonary effort is normal. No respiratory distress.      Breath sounds: No stridor. No wheezing or rales.   Abdominal:      Palpations: Abdomen is soft.      Tenderness: There is no abdominal tenderness. There is no guarding or rebound.   Skin:     General: Skin is warm and dry.      Findings: No rash.   Neurological:      Mental Status: He is oriented to person, place, and time.   Psychiatric:         Thought Content: Thought content normal.         Fluids    Intake/Output Summary (Last 24 hours) at 2/7/2025 1237  Last data filed at 2/7/2025 0900  Gross per 24 hour   Intake 1620 ml   Output 2980 ml   Net -1360 ml        Laboratory  Recent Labs     02/05/25 0418 02/06/25  0424 02/07/25  0351   WBC 9.4 7.9 9.6   RBC 3.43* 2.76* 2.94*   HEMOGLOBIN 10.3* 8.3* 8.9*   HEMATOCRIT 34.0* 26.8* 28.3*   MCV 99.1* 97.1 96.3   MCH 30.0 30.1 30.3   MCHC 30.3* 31.0* 31.4*   RDW 63.7* 61.6* 62.3*   PLATELETCT 133* 114* 121*   MPV 10.8 11.4 11.9     Recent Labs     02/05/25 0418 02/06/25  0424 02/07/25  0351   SODIUM 136 135 137   POTASSIUM 4.2 3.7 3.7   CHLORIDE 100 101 102   CO2 26 26 28   GLUCOSE 89 88 99   BUN 22 23* 19   CREATININE 2.66* 2.03* 1.70*   CALCIUM 9.0 8.4* 8.5                   Imaging  DX-CHEST-LIMITED (1 VIEW)   Final Result         1.  Pulmonary edema and/or infiltrates, stable since prior study.   2.  Trace bilateral pleural  effusions   3.  Atherosclerosis      DX-CHEST-LIMITED (1 VIEW)   Final Result      1.  Right-sided temporary dialysis catheter terminates with the tip projecting over the expected location of the mid to proximal SVC.   2.  Small to moderate right and small left pleural effusions with associated compressive atelectasis and/or consolidation.      CT-HEAD W/O   Final Result      1.  Diffuse atrophy and white matter changes.   2.  No acute intracranial hemorrhage or territorial infarct.   3.  Small chronic infarcts.               DX-CHEST-PORTABLE (1 VIEW)   Final Result      Right IJ catheter terminates in the region of the SVC. No pneumothorax.      Bibasilar opacities.           Assessment/Plan  * Hyperkalemia- (present on admission)  Assessment & Plan  Secondary to THERESA/CKD  Now on iHD  Monitor daily    Potassium 3.7  Holding off on dialysis today  Follow-up BMP in a.m.    Bacterial pneumonia- (present on admission)  Assessment & Plan  Completed course of amp/sulbactam  Cultures neg    Shock (HCC)  Assessment & Plan  Did have short episode of shock that did require Levophed however this has been off for over 24 hours and now blood pressure has increased  Restarting home antihypertensive    Toxic metabolic encephalopathy  Assessment & Plan  Patient continues to be encephalopathic  Likely uremic due to acute on chronic kidney disease  Also possibly secondary to pneumonia  Continue Unasyn  Hemodialysis per nephrology  CT head showed nothing acute    1/31  Overnight associated with severe agitation  Patient was placed in restraints and Precedex drip was initiated    2/1  Agitation has improved yet patient remains confused  I am unsure what his baseline is  No need for Precedex drip or restraints    2/6  Mild confusion but overall much improved    Appears to be back to baseline    Acute on chronic respiratory failure with hypoxia and hypercapnia (HCC)  Assessment & Plan  Patient does have a history of COPD and does  require 6 L of oxygen at baseline   CT chest showed changes consistent with right lower lobe pneumonia   Patient has been started on Unasyn and azithromycin   Has improved now back to his baseline    Patient is back to baseline 6 L of oxygen by nasal cannula      Acute on chronic renal failure (HCC)  Assessment & Plan  CKD IV baseline now with THERESA likely ATN and requiring HD  Associated with severe hyperkalemia   Nephrology consulted, temporary dialysis catheter was placed  Making good UOP  Repeat BMP daily  Uncertain prognosis for renal recovery  Renally dose medications as appropriate    Continue monitoring    Hypertension, essential- (present on admission)  Assessment & Plan  On home amlodipine  As needed hydralazine  Adjust as needed    Abdominal aortic aneurysm (AAA) without rupture (HCC)- (present on admission)  Assessment & Plan  Continue outpatient follow-up   No acute worsening   Does appear at baseline it is around 5.5 cm     Monitoring for any chest pain or abdominal pain         VTE prophylaxis: Heparin    I have performed a physical exam and reviewed and updated ROS and Plan today (2/7/2025). In review of yesterday's note (2/6/2025), there are no changes except as documented above.      I reviewed CBC  I reviewed BMP  I reviewed albumin level  I reviewed phosphorus levels  Discussed with nephrology  Discussed with clinical pharmacist  I have ordered blood work for in a.m.  Review note from previous hospitalist      Total time of 52 minutes spent prepping to see patient (e.g. reviewing  tests/imaging results, notes from consultants, bedside nurse, night shift ) obtaining and/or reviewing separately obtained history. Performing a medically appropriate examination and evaluation.  Counseling and educating the patient.  Ordering medications, tests, or procedures.  Referring and communicating with other health care professionals.  Documenting clinical information in EPIC.  Independently interpreting results  and communicating results to patient.  Care coordination.

## 2025-02-07 NOTE — PROGRESS NOTES
Bedside report received from off going RN/tech: Amber, assumed care of patient.     Fall Risk Score: HIGH RISK  Fall risk interventions in place: Provide patient/family education based on risk assessment, Educate patient/family to call staff for assistance when getting out of bed, Place fall precaution signage outside patient door, Place patient in room close to nursing station, Utilize bed/chair fall alarm, and Bed alarm connected correctly  Bed type: Regular (Mauricio Score less than 17 interventions in place)  Patient on cardiac monitor: Yes  IVF/IV medications: Not Applicable   Oxygen: How many liters 6L  Bedside sitter: Not Applicable   Isolation: Not applicable

## 2025-02-07 NOTE — PROGRESS NOTES
Monitor Summary    SR 85-82    Rare PVC, couplet, Occasional PAC    0.14/0.06/0.35

## 2025-02-07 NOTE — DISCHARGE PLANNING
Case Management Discharge Planning    Admission Date: 1/29/2025  GMLOS: 4.4  ALOS: 9    6-Clicks ADL Score: 12  6-Clicks Mobility Score: 15  PT and/or OT Eval ordered: Yes  PT/OT:Pending   Post-acute Referrals Ordered: NA  Post-acute Choice Obtained: NA  Has referral(s) been sent to post-acute provider:  NA      Anticipated Discharge Dispo: Discharge Disposition: Disch to a long term care facility (63)  Discharge Address: [facility]  Discharge Contact Phone Number: Dtr- Sarahi ([phone])    DME Needed: Pending hospital course     Action(s) Taken: LMSW collaborated with Muna on clarification on needing HD chair or not. Per Muna she has not been working on a chair due to pending Neph recs. Muna will confirm with neph if pt is going to need chair if so [facility] does not do dialysis and will most likely need a local SNF who can accommodate dialysis. PT/OT pending to determine if pt need SNF.     1111 Per Muna and neph it is too soon to determine if pt need long term HD. Neph to evaluate over the weekends and may know by Monday. PT/OT pending.     Escalations Completed: None    Medically Clear: No    Next Steps: F/U with HCM needs    Barriers to Discharge: Medical clearance    Is the patient up for discharge tomorrow: No

## 2025-02-08 PROBLEM — Z71.89 ADVANCE CARE PLANNING: Status: ACTIVE | Noted: 2025-02-08

## 2025-02-08 LAB
ALBUMIN SERPL BCP-MCNC: 2.9 G/DL (ref 3.2–4.9)
BUN SERPL-MCNC: 18 MG/DL (ref 8–22)
CALCIUM ALBUM COR SERPL-MCNC: 9.8 MG/DL (ref 8.5–10.5)
CALCIUM SERPL-MCNC: 8.9 MG/DL (ref 8.5–10.5)
CHLORIDE SERPL-SCNC: 103 MMOL/L (ref 96–112)
CO2 SERPL-SCNC: 28 MMOL/L (ref 20–33)
CREAT SERPL-MCNC: 1.54 MG/DL (ref 0.5–1.4)
ERYTHROCYTE [DISTWIDTH] IN BLOOD BY AUTOMATED COUNT: 61.6 FL (ref 35.9–50)
GFR SERPLBLD CREATININE-BSD FMLA CKD-EPI: 48 ML/MIN/1.73 M 2
GLUCOSE SERPL-MCNC: 97 MG/DL (ref 65–99)
HCT VFR BLD AUTO: 29.1 % (ref 42–52)
HGB BLD-MCNC: 9.1 G/DL (ref 14–18)
MCH RBC QN AUTO: 30.1 PG (ref 27–33)
MCHC RBC AUTO-ENTMCNC: 31.3 G/DL (ref 32.3–36.5)
MCV RBC AUTO: 96.4 FL (ref 81.4–97.8)
PHOSPHATE SERPL-MCNC: 1.8 MG/DL (ref 2.5–4.5)
PLATELET # BLD AUTO: 124 K/UL (ref 164–446)
PMV BLD AUTO: 11.8 FL (ref 9–12.9)
POTASSIUM SERPL-SCNC: 3.8 MMOL/L (ref 3.6–5.5)
RBC # BLD AUTO: 3.02 M/UL (ref 4.7–6.1)
SODIUM SERPL-SCNC: 137 MMOL/L (ref 135–145)
WBC # BLD AUTO: 8.8 K/UL (ref 4.8–10.8)

## 2025-02-08 PROCEDURE — 700102 HCHG RX REV CODE 250 W/ 637 OVERRIDE(OP): Performed by: INTERNAL MEDICINE

## 2025-02-08 PROCEDURE — 99233 SBSQ HOSP IP/OBS HIGH 50: CPT | Performed by: STUDENT IN AN ORGANIZED HEALTH CARE EDUCATION/TRAINING PROGRAM

## 2025-02-08 PROCEDURE — A9270 NON-COVERED ITEM OR SERVICE: HCPCS | Performed by: INTERNAL MEDICINE

## 2025-02-08 PROCEDURE — 80069 RENAL FUNCTION PANEL: CPT

## 2025-02-08 PROCEDURE — 700102 HCHG RX REV CODE 250 W/ 637 OVERRIDE(OP): Performed by: STUDENT IN AN ORGANIZED HEALTH CARE EDUCATION/TRAINING PROGRAM

## 2025-02-08 PROCEDURE — 770006 HCHG ROOM/CARE - MED/SURG/GYN SEMI*

## 2025-02-08 PROCEDURE — 700102 HCHG RX REV CODE 250 W/ 637 OVERRIDE(OP): Performed by: HOSPITALIST

## 2025-02-08 PROCEDURE — 700111 HCHG RX REV CODE 636 W/ 250 OVERRIDE (IP): Performed by: INTERNAL MEDICINE

## 2025-02-08 PROCEDURE — 700102 HCHG RX REV CODE 250 W/ 637 OVERRIDE(OP): Performed by: EMERGENCY MEDICINE

## 2025-02-08 PROCEDURE — A9270 NON-COVERED ITEM OR SERVICE: HCPCS | Performed by: EMERGENCY MEDICINE

## 2025-02-08 PROCEDURE — 85027 COMPLETE CBC AUTOMATED: CPT

## 2025-02-08 PROCEDURE — A9270 NON-COVERED ITEM OR SERVICE: HCPCS | Performed by: STUDENT IN AN ORGANIZED HEALTH CARE EDUCATION/TRAINING PROGRAM

## 2025-02-08 PROCEDURE — 99497 ADVNCD CARE PLAN 30 MIN: CPT | Performed by: STUDENT IN AN ORGANIZED HEALTH CARE EDUCATION/TRAINING PROGRAM

## 2025-02-08 PROCEDURE — A9270 NON-COVERED ITEM OR SERVICE: HCPCS | Performed by: HOSPITALIST

## 2025-02-08 PROCEDURE — 36415 COLL VENOUS BLD VENIPUNCTURE: CPT

## 2025-02-08 RX ORDER — LIDOCAINE 4 G/G
1 PATCH TOPICAL EVERY 24 HOURS
Status: DISCONTINUED | OUTPATIENT
Start: 2025-02-09 | End: 2025-02-12 | Stop reason: HOSPADM

## 2025-02-08 RX ORDER — OXYCODONE HYDROCHLORIDE 5 MG/1
5 TABLET ORAL EVERY 4 HOURS PRN
Status: DISCONTINUED | OUTPATIENT
Start: 2025-02-08 | End: 2025-02-12 | Stop reason: HOSPADM

## 2025-02-08 RX ORDER — OXYCODONE HYDROCHLORIDE 5 MG/1
2.5 TABLET ORAL EVERY 4 HOURS PRN
Status: DISCONTINUED | OUTPATIENT
Start: 2025-02-08 | End: 2025-02-12 | Stop reason: HOSPADM

## 2025-02-08 RX ADMIN — PRAMIPEXOLE DIHYDROCHLORIDE 0.5 MG: 0.5 TABLET ORAL at 04:44

## 2025-02-08 RX ADMIN — AMLODIPINE BESYLATE 10 MG: 10 TABLET ORAL at 04:44

## 2025-02-08 RX ADMIN — UMECLIDINIUM BROMIDE AND VILANTEROL TRIFENATATE 1 PUFF: 62.5; 25 POWDER RESPIRATORY (INHALATION) at 08:36

## 2025-02-08 RX ADMIN — ISOSORBIDE MONONITRATE 240 MG: 60 TABLET, EXTENDED RELEASE ORAL at 04:44

## 2025-02-08 RX ADMIN — ATORVASTATIN CALCIUM 40 MG: 40 TABLET, FILM COATED ORAL at 17:43

## 2025-02-08 RX ADMIN — GUAIFENESIN 600 MG: 600 TABLET, EXTENDED RELEASE ORAL at 04:44

## 2025-02-08 RX ADMIN — HEPARIN SODIUM 5000 UNITS: 5000 INJECTION, SOLUTION INTRAVENOUS; SUBCUTANEOUS at 14:44

## 2025-02-08 RX ADMIN — HEPARIN SODIUM 5000 UNITS: 5000 INJECTION, SOLUTION INTRAVENOUS; SUBCUTANEOUS at 04:44

## 2025-02-08 RX ADMIN — LEVOTHYROXINE SODIUM 125 MCG: 0.12 TABLET ORAL at 04:44

## 2025-02-08 RX ADMIN — POLYETHYLENE GLYCOL 3350 1 PACKET: 17 POWDER, FOR SOLUTION ORAL at 04:43

## 2025-02-08 RX ADMIN — HEPARIN SODIUM 5000 UNITS: 5000 INJECTION, SOLUTION INTRAVENOUS; SUBCUTANEOUS at 20:07

## 2025-02-08 RX ADMIN — GUAIFENESIN 600 MG: 600 TABLET, EXTENDED RELEASE ORAL at 17:44

## 2025-02-08 RX ADMIN — SENNOSIDES AND DOCUSATE SODIUM 2 TABLET: 50; 8.6 TABLET ORAL at 17:44

## 2025-02-08 RX ADMIN — CLOPIDOGREL BISULFATE 75 MG: 75 TABLET, FILM COATED ORAL at 04:44

## 2025-02-08 RX ADMIN — GABAPENTIN 200 MG: 100 CAPSULE ORAL at 04:44

## 2025-02-08 ASSESSMENT — PAIN DESCRIPTION - PAIN TYPE
TYPE: ACUTE PAIN
TYPE: ACUTE PAIN

## 2025-02-08 ASSESSMENT — FIBROSIS 4 INDEX: FIB4 SCORE: 2.04

## 2025-02-08 ASSESSMENT — ENCOUNTER SYMPTOMS
PALPITATIONS: 0
LOSS OF CONSCIOUSNESS: 0
SHORTNESS OF BREATH: 0
ABDOMINAL PAIN: 0
FEVER: 0
VOMITING: 0
HEADACHES: 0
BACK PAIN: 0
COUGH: 0
DIZZINESS: 0
NAUSEA: 0
CHILLS: 0
DIARRHEA: 0

## 2025-02-08 NOTE — PROGRESS NOTES
[facility] -  PROGRESS NOTE               Author: Praneeth Lassiter M.D. Date & Time: 2/8/2025  11:12 AM     HPI:  This is a 69-year-old male with past medical history of HFpEF, COPD on 6L NC at baseline, CKD stage IV with last Scr in the office of 2.3, infrarenal AAA, blindness who lives in a skilled nursing facility in [place].  Over the last several days has been more confused with generalized weakness.  He was sent to the ER at [facility] in [place] where he was found to be tachypneic with a normal heart rate and blood pressure.  Labs showed acute on chronic kidney disease with a creatinine of 6.57 and a potassium of 7.8.  He was given hyperkalemia protocol of IV calcium, albuterol and insulin.  He became mildly hypotensive therefore was given 2 L of NS and a potassium was repeated which was 7.6.  Calcium and albuterol were repeated.  Last VBG showed a pH of 7.1, pCO2 74, PaO2 80, bicarb 27, base excess -2.  A CT of the head was performed for his altered mental status which showed no acute abnormalities, CT of his abdomen showed no obstructing stone however atrophied kidneys and stable calcified infrarenal AAA, the chest portion of his CT abdomen did show a right lower lobe infiltrate and bilateral effusions and he has been given some for the possible pneumonia.  He was started on BiPAP to treat his hypercapnia.  He was also given IV Bicarbonate for his acidosis. Goals of care were discussed by the sending physician with his family who states that the patient wanted everything done including dialysis, CPR, intubation.He was flighted to [facility] for advanced care. Nephrology was consulted for THERESA/Hyperkalemia from [place] and notified by [facility] on arrival. He is currently on BiPAP in the ICU. He is currently hemodynamically stable but his BP is soft with systolics in the low 90s.  Scr is 6.19 here and Bicarb is 25. Rest of BMP is pending. Patient is not able to provide history.  This information is obtained from the medical record.      DAILY NEPHROLOGY SUMMARY:  1/29 - consult done, had HD  1/30 - K improved to 5.8, SBP 100s-130s, no UOP recorded, on BiPAP, answers some questions, slightly confused, no complaints  1/31 - tolerated HD, UF 2L, SBP 80s-120s this AM, K 5.3, UOP 1.695L, off BiPAP this AM, no new c/o, feels SOB and edema improved, no n/v/d, frustrated by NPO status (awaiting swallow eval)  2/01: Seen this Am sitting up In bed eating pancakes for breakfast. Doing well without complaints awaiting for HD this AM.   2/02: Had a fall overnight. He is sleeping in his bed this AM. Feels pretty tired from all the dialysis per patient but agreeable to anything that is recommended when asked about taking a break. He continues to make goo urine and had 1.3L UOP even with Hd and UF of 2.5L yesterday. Will hold today. Will hold off on standing orders and allow day team to reassess tomorrow for HD needs and monitor renal recovery.   2/03: NAEO, no complaints, sitting in chair at bedside, feels same as yesterday  2/04: Creatinine worsening today (3.52, up from 2.6). Repeat UA ordered however having poor UOP. Quite somnolent this morning; in setting of increasing creatinine with poor UOP and encephalopathy resuming dialysis due to possible uremic encephalopathy   2/05: NAEO, avoided intubation, tolerated BiPAP; Woke up a bit after HD yesterday; [facility]ICU  2/06: NAEO. Tolerated 3 hrs of UF yesterday, scheduled HD today due to oliguria. Creatinine approaching 2, though UOP documented as 250 cc last 24 hrs. Mentation much improved, patient feels well, no complaints. Transferred out of ICU to telemetry   2/07: NAEO,  cc (0.3 cc/kg/hr). Dialysis yesterday (2L net UF). Cr showing good response to dialysis sessions. Complaining of difficult to clear sputum but breathing otherwise feels about the same.   2/08: NAEO, no complaints, breathing better, feels about same    REVIEW OF SYSTEMS:    10 point  ROS reviewed and is as per HPI/daily summary or otherwise negative    PMH/PSH/SH/FH: Reviewed and unchanged since admission note  CURRENT MEDICATIONS: Reviewed from admission to present day    VS:  /59   Pulse 72   Temp 36.4 °C (97.5 °F) (Temporal)   Resp 16   Ht 1.829 m (6')   Wt 81.9 kg (180 lb 8.9 oz)   SpO2 96%   BMI 24.49 kg/m²     Physical Exam  Nursing note reviewed.   Constitutional:       Appearance: Normal appearance. He is ill-appearing. He is not toxic-appearing.      Interventions: Face mask in place.   Eyes:      General: No scleral icterus.  Cardiovascular:      Rate and Rhythm: Normal rate.      Comments: No edema  Pulmonary:      Effort: Pulmonary effort is normal.   Abdominal:      General: There is no distension.   Musculoskeletal:         General: No deformity.      Right lower leg: No edema.      Left lower leg: No edema.   Skin:     Findings: No rash.   Neurological:      Mental Status: He is alert and easily aroused.   Psychiatric:         Behavior: Behavior is cooperative.         FLUID BALANCE:  In: 1058 [P.O.:818; Other:240]  Out: 1100     LABS:  Recent Labs     02/06/25  0424 02/07/25  0351 02/08/25  0128   SODIUM 135 137 137   POTASSIUM 3.7 3.7 3.8   CHLORIDE 101 102 103   CO2 26 28 28   GLUCOSE 88 99 97   BUN 23* 19 18   CREATININE 2.03* 1.70* 1.54*   CALCIUM 8.4* 8.5 8.9     Recent Labs     02/06/25  0424 02/07/25  0351   WBC 7.9 9.6   RBC 2.76* 2.94*   HEMOGLOBIN 8.3* 8.9*   HEMATOCRIT 26.8* 28.3*   MCV 97.1 96.3   MCH 30.1 30.3   MCHC 31.0* 31.4*   RDW 61.6* 62.3*   PLATELETCT 114* 121*   MPV 11.4 11.9     IMPRESSION:  # THERESA-DD  - Etiology is unclear, but suspect ATN  - HD x 3 through 2/1  - SCr significantly uptrending with trial of holding dialysis 2/2-2/3 so HD resumed 2/4; UOP oliguric  # CKD Stage IV 2/2 HTN and Ischemic Nephropathy  # Acidosis, improved  # Hyperkalemia, Resolved  # Hypercapnea  # HTN  # Acute on chronic resp failure  - initial concern for PNA, on  abx, blood cx NGTD  # Encephalopathy, resolved  -Concern for uremic encephalopathy though likely multifactorial given concomitant CO2 retention requiring BiPAP  # Hypervolemia, Resolved  -Initially severely overloaded and resistant to diuretics , improved  -history of challenging to control volume  # CAD  # HFpEF  # AAA <4 cm  # HLD  # Hypothyroidism  # Hypoalbuminemia  # Anemia  - ferritin 313, %sat 28  - hgb goal 10-11, currently at goal  # CKD MBD  - Ca wnl phos wnl  - PTH 2/1/25= 187     PLAN:  - iHD held today  - If SCr remians stable, may consider holding HD, but day to day eval for now  - Dose all meds per eGFR < 15  - Monitor I/Os  - Daily labs  - Low sodium diet

## 2025-02-08 NOTE — CARE PLAN
The patient is Stable - Low risk of patient condition declining or worsening    Shift Goals  Clinical Goals: VSS, Mobility  Patient Goals: Up to chair  Family Goals: N/A    Patient a/o x4. Patient on 6 liters of 02, saturation 98%. Call light and belongings are within reach. Bed is in low locked position.     Progress made toward(s) clinical / shift goals:    Problem: Knowledge Deficit - Standard  Goal: Patient and family/care givers will demonstrate understanding of plan of care, disease process/condition, diagnostic tests and medications  Outcome: Progressing     Problem: Skin Integrity  Goal: Skin integrity is maintained or improved  Outcome: Progressing     Problem: Fall Risk  Goal: Patient will remain free from falls  Outcome: Progressing     Problem: Hemodynamics  Goal: Patient's hemodynamics, fluid balance and neurologic status will be stable or improve  Outcome: Progressing     Problem: Respiratory  Goal: Patient will achieve/maintain optimum respiratory ventilation and gas exchange  Outcome: Progressing     Problem: Pain - Standard  Goal: Alleviation of pain or a reduction in pain to the patient’s comfort goal  Outcome: Progressing       Patient is not progressing towards the following goals:

## 2025-02-08 NOTE — CARE PLAN
The patient is Stable - Low risk of patient condition declining or worsening    Shift Goals  Clinical Goals: (P) Patient safety/mobility  Patient Goals: (P) Rest  Family Goals: (P) DANICA    Progress made toward(s) clinical / shift goals:    Problem: Knowledge Deficit - Standard  Goal: Patient and family/care givers will demonstrate understanding of plan of care, disease process/condition, diagnostic tests and medications  Description: Target End Date:  1-3 days or as soon as patient condition allows    Document in Patient Education    1.  Patient and family/caregiver oriented to unit, equipment, visitation policy and means for communicating concern  2.  Complete/review Learning Assessment  3.  Assess knowledge level of disease process/condition, treatment plan, diagnostic tests and medications  4.  Explain disease process/condition, treatment plan, diagnostic tests and medications  Outcome: Progressing     Problem: Respiratory  Goal: Patient will achieve/maintain optimum respiratory ventilation and gas exchange  Description: Target End Date:  Prior to discharge or change in level of care    Document on Assessment flowsheet    1.  Assess and monitor rate, rhythm, depth and effort of respiration  2.  Breath sounds assessed qshift and/or as needed  3.  Assess O2 saturation, administer/titrate oxygen as ordered  4.  Position patient for maximum ventilatory efficiency  5.  Turn, cough, and deep breath with splinting to improve effectiveness  6.  Collaborate with RT to administer medication/treatments per order  7.  Encourage use of incentive spirometer and encourage patient to cough after use and utilize splinting techniques if applicable  8.  Airway suctioning  9.  Monitor sputum production for changes in color, consistency and frequency  10. Perform frequent oral hygiene  11. Alternate physical activity with rest periods  Outcome: Progressing    Pt is stable on 3L of oxygen throughout the shift. Pt reports feeling much  better today compared to previous days.     Patient is not progressing towards the following goals:

## 2025-02-08 NOTE — CARE PLAN
The patient is Stable - Low risk of patient condition declining or worsening    Shift Goals  Clinical Goals: Patient safety/mobility  Patient Goals: Rest  Family Goals: DANICA    Progress made toward(s) clinical / shift goals: Patient denies any pain , Compliant with POC, assisted with meal Resting comfortably, bed in lowest position, call light within reach. wean patient to 3L sats at 96%    Patient is not progressing towards the following goals:

## 2025-02-09 LAB
ALBUMIN SERPL BCP-MCNC: 2.9 G/DL (ref 3.2–4.9)
BUN SERPL-MCNC: 19 MG/DL (ref 8–22)
CALCIUM ALBUM COR SERPL-MCNC: 9.7 MG/DL (ref 8.5–10.5)
CALCIUM SERPL-MCNC: 8.8 MG/DL (ref 8.5–10.5)
CHLORIDE SERPL-SCNC: 103 MMOL/L (ref 96–112)
CO2 SERPL-SCNC: 27 MMOL/L (ref 20–33)
CREAT SERPL-MCNC: 1.46 MG/DL (ref 0.5–1.4)
ERYTHROCYTE [DISTWIDTH] IN BLOOD BY AUTOMATED COUNT: 59.9 FL (ref 35.9–50)
GFR SERPLBLD CREATININE-BSD FMLA CKD-EPI: 52 ML/MIN/1.73 M 2
GLUCOSE SERPL-MCNC: 91 MG/DL (ref 65–99)
HCT VFR BLD AUTO: 30.1 % (ref 42–52)
HGB BLD-MCNC: 9.5 G/DL (ref 14–18)
MCH RBC QN AUTO: 30.3 PG (ref 27–33)
MCHC RBC AUTO-ENTMCNC: 31.6 G/DL (ref 32.3–36.5)
MCV RBC AUTO: 95.9 FL (ref 81.4–97.8)
PHOSPHATE SERPL-MCNC: 2 MG/DL (ref 2.5–4.5)
PLATELET # BLD AUTO: 133 K/UL (ref 164–446)
PMV BLD AUTO: 10.8 FL (ref 9–12.9)
POTASSIUM SERPL-SCNC: 4.4 MMOL/L (ref 3.6–5.5)
RBC # BLD AUTO: 3.14 M/UL (ref 4.7–6.1)
SODIUM SERPL-SCNC: 137 MMOL/L (ref 135–145)
WBC # BLD AUTO: 8 K/UL (ref 4.8–10.8)

## 2025-02-09 PROCEDURE — A9270 NON-COVERED ITEM OR SERVICE: HCPCS | Performed by: INTERNAL MEDICINE

## 2025-02-09 PROCEDURE — 700102 HCHG RX REV CODE 250 W/ 637 OVERRIDE(OP): Performed by: INTERNAL MEDICINE

## 2025-02-09 PROCEDURE — 99232 SBSQ HOSP IP/OBS MODERATE 35: CPT | Performed by: STUDENT IN AN ORGANIZED HEALTH CARE EDUCATION/TRAINING PROGRAM

## 2025-02-09 PROCEDURE — 700102 HCHG RX REV CODE 250 W/ 637 OVERRIDE(OP)

## 2025-02-09 PROCEDURE — 700102 HCHG RX REV CODE 250 W/ 637 OVERRIDE(OP): Performed by: STUDENT IN AN ORGANIZED HEALTH CARE EDUCATION/TRAINING PROGRAM

## 2025-02-09 PROCEDURE — A9270 NON-COVERED ITEM OR SERVICE: HCPCS | Performed by: HOSPITALIST

## 2025-02-09 PROCEDURE — A9270 NON-COVERED ITEM OR SERVICE: HCPCS | Performed by: EMERGENCY MEDICINE

## 2025-02-09 PROCEDURE — 700102 HCHG RX REV CODE 250 W/ 637 OVERRIDE(OP): Performed by: HOSPITALIST

## 2025-02-09 PROCEDURE — 700101 HCHG RX REV CODE 250

## 2025-02-09 PROCEDURE — 770006 HCHG ROOM/CARE - MED/SURG/GYN SEMI*

## 2025-02-09 PROCEDURE — A9270 NON-COVERED ITEM OR SERVICE: HCPCS

## 2025-02-09 PROCEDURE — 700102 HCHG RX REV CODE 250 W/ 637 OVERRIDE(OP): Performed by: EMERGENCY MEDICINE

## 2025-02-09 PROCEDURE — 85027 COMPLETE CBC AUTOMATED: CPT

## 2025-02-09 PROCEDURE — 36415 COLL VENOUS BLD VENIPUNCTURE: CPT

## 2025-02-09 PROCEDURE — 700111 HCHG RX REV CODE 636 W/ 250 OVERRIDE (IP): Performed by: INTERNAL MEDICINE

## 2025-02-09 PROCEDURE — A9270 NON-COVERED ITEM OR SERVICE: HCPCS | Performed by: STUDENT IN AN ORGANIZED HEALTH CARE EDUCATION/TRAINING PROGRAM

## 2025-02-09 PROCEDURE — 80069 RENAL FUNCTION PANEL: CPT

## 2025-02-09 RX ADMIN — CLOPIDOGREL BISULFATE 75 MG: 75 TABLET, FILM COATED ORAL at 04:24

## 2025-02-09 RX ADMIN — ATORVASTATIN CALCIUM 40 MG: 40 TABLET, FILM COATED ORAL at 17:00

## 2025-02-09 RX ADMIN — HEPARIN SODIUM 5000 UNITS: 5000 INJECTION, SOLUTION INTRAVENOUS; SUBCUTANEOUS at 15:00

## 2025-02-09 RX ADMIN — GABAPENTIN 200 MG: 100 CAPSULE ORAL at 04:24

## 2025-02-09 RX ADMIN — GUAIFENESIN 600 MG: 600 TABLET, EXTENDED RELEASE ORAL at 17:00

## 2025-02-09 RX ADMIN — UMECLIDINIUM BROMIDE AND VILANTEROL TRIFENATATE 1 PUFF: 62.5; 25 POWDER RESPIRATORY (INHALATION) at 10:51

## 2025-02-09 RX ADMIN — AMLODIPINE BESYLATE 10 MG: 10 TABLET ORAL at 04:24

## 2025-02-09 RX ADMIN — SENNOSIDES AND DOCUSATE SODIUM 2 TABLET: 50; 8.6 TABLET ORAL at 17:00

## 2025-02-09 RX ADMIN — PRAMIPEXOLE DIHYDROCHLORIDE 0.5 MG: 0.5 TABLET ORAL at 04:25

## 2025-02-09 RX ADMIN — ISOSORBIDE MONONITRATE 240 MG: 60 TABLET, EXTENDED RELEASE ORAL at 05:43

## 2025-02-09 RX ADMIN — OXYCODONE 5 MG: 5 TABLET ORAL at 00:07

## 2025-02-09 RX ADMIN — HEPARIN SODIUM 5000 UNITS: 5000 INJECTION, SOLUTION INTRAVENOUS; SUBCUTANEOUS at 21:18

## 2025-02-09 RX ADMIN — UMECLIDINIUM BROMIDE AND VILANTEROL TRIFENATATE 1 PUFF: 62.5; 25 POWDER RESPIRATORY (INHALATION) at 05:44

## 2025-02-09 RX ADMIN — GUAIFENESIN 600 MG: 600 TABLET, EXTENDED RELEASE ORAL at 04:25

## 2025-02-09 RX ADMIN — LIDOCAINE 1 PATCH: 4 PATCH TOPICAL at 00:08

## 2025-02-09 RX ADMIN — LEVOTHYROXINE SODIUM 125 MCG: 0.12 TABLET ORAL at 04:25

## 2025-02-09 RX ADMIN — OXYCODONE 5 MG: 5 TABLET ORAL at 23:14

## 2025-02-09 RX ADMIN — OXYCODONE 5 MG: 5 TABLET ORAL at 04:25

## 2025-02-09 RX ADMIN — HEPARIN SODIUM 5000 UNITS: 5000 INJECTION, SOLUTION INTRAVENOUS; SUBCUTANEOUS at 04:25

## 2025-02-09 RX ADMIN — LIDOCAINE 1 PATCH: 4 PATCH TOPICAL at 23:03

## 2025-02-09 ASSESSMENT — ENCOUNTER SYMPTOMS
ABDOMINAL PAIN: 0
VOMITING: 0
DIARRHEA: 0
CHILLS: 0
PALPITATIONS: 0
FEVER: 0
SHORTNESS OF BREATH: 0
BACK PAIN: 0
COUGH: 0
DIZZINESS: 0
LOSS OF CONSCIOUSNESS: 0
NAUSEA: 0
HEADACHES: 0

## 2025-02-09 ASSESSMENT — PAIN DESCRIPTION - PAIN TYPE
TYPE: ACUTE PAIN

## 2025-02-09 NOTE — PROGRESS NOTES
Hospital Medicine Daily Progress Note    Date of Service  2/9/2025    Chief Complaint  Pb Peters is a 69 y.o. male admitted 1/29/2025 with altered, hypoxic    Hospital Course  68yo PMHx COPD, chronic hypoxic respiratory failure on 6 LNC baseline, HTN, DM, CKD IV, hypothyroidism, CAD, currently living in a SNF in [place].  Presented to an [facility] hospital with hypercapnic resp failuure, pneumonia, THERESA on CKD with Cr 6.57 and hyperkalemia with Cr 7.8. He was given hyperkalemia protocol of IV calcium, albuterol and insulin.  A CT of the head was performed for his altered mental status which showed no acute abnormalities, CT of his abdomen showed no obstructing stone however atrophied kidneys and stable calcified infrarenal AAA, the chest portion of his CT abdomen did show a right lower lobe infiltrate and bilateral effusions and he has been given some for the possible pneumonia.  He was started on BiPAP to treat his hypercapnia.  He was also given IV Bicarbonate for his acidosis.   He was transferred here for emergent HD.     Nephrology was consulted. HD catheter placed on 1/29    Interval Problem Update  Seen patient at bedside  Currently on 3L O2.   No acute overnight events  Labs reviewed Cr 1.46.  Continue to hold HD  Discussed with nephrology, if Cr remains stable, may consider holding HD  Continue monitor renal function daily    I have discussed this patient's plan of care and discharge plan at IDT rounds today with Case Management, Nursing, Nursing leadership, and other members of the IDT team.    Consultants/Specialty  nephrology    Code Status  Full Code    Disposition  The patient is not medically cleared for discharge to home or a post-acute facility.      I have placed the appropriate orders for post-discharge needs.    Review of Systems  Review of Systems   Constitutional:  Negative for chills and fever.   Respiratory:  Negative for cough and shortness of breath.    Cardiovascular:  Negative  for chest pain, palpitations and leg swelling.   Gastrointestinal:  Negative for abdominal pain, diarrhea, nausea and vomiting.   Genitourinary:  Negative for dysuria and urgency.   Musculoskeletal:  Negative for back pain.   Skin:  Negative for rash.   Neurological:  Negative for dizziness, loss of consciousness and headaches.        Physical Exam  Temp:  [36.4 °C (97.6 °F)-36.8 °C (98.2 °F)] 36.4 °C (97.6 °F)  Pulse:  [71-78] 71  Resp:  [16-18] 18  BP: (142-158)/(69-74) 142/72  SpO2:  [91 %-95 %] 91 %    Physical Exam  Constitutional:       General: He is not in acute distress.     Appearance: He is well-developed. He is not diaphoretic.   HENT:      Head: Normocephalic and atraumatic.   Eyes:      Conjunctiva/sclera: Conjunctivae normal.   Neck:      Vascular: No JVD.   Cardiovascular:      Rate and Rhythm: Normal rate.      Heart sounds: No murmur heard.     No gallop.   Pulmonary:      Effort: Pulmonary effort is normal. No respiratory distress.      Breath sounds: No stridor. No wheezing or rales.   Abdominal:      Palpations: Abdomen is soft.      Tenderness: There is no abdominal tenderness. There is no guarding or rebound.   Skin:     General: Skin is warm and dry.      Findings: No rash.   Neurological:      Mental Status: He is oriented to person, place, and time.   Psychiatric:         Thought Content: Thought content normal.         Fluids    Intake/Output Summary (Last 24 hours) at 2/9/2025 1553  Last data filed at 2/9/2025 1000  Gross per 24 hour   Intake 610 ml   Output 1000 ml   Net -390 ml        Laboratory  Recent Labs     02/07/25  0351 02/08/25  0128 02/09/25  0934   WBC 9.6 8.8 8.0   RBC 2.94* 3.02* 3.14*   HEMOGLOBIN 8.9* 9.1* 9.5*   HEMATOCRIT 28.3* 29.1* 30.1*   MCV 96.3 96.4 95.9   MCH 30.3 30.1 30.3   MCHC 31.4* 31.3* 31.6*   RDW 62.3* 61.6* 59.9*   PLATELETCT 121* 124* 133*   MPV 11.9 11.8 10.8     Recent Labs     02/07/25  0351 02/08/25  0128 02/09/25  0719   SODIUM 137 137 137    POTASSIUM 3.7 3.8 4.4   CHLORIDE 102 103 103   CO2 28 28 27   GLUCOSE 99 97 91   BUN 19 18 19   CREATININE 1.70* 1.54* 1.46*   CALCIUM 8.5 8.9 8.8                   Imaging  DX-CHEST-LIMITED (1 VIEW)   Final Result         1.  Pulmonary edema and/or infiltrates, stable since prior study.   2.  Trace bilateral pleural effusions   3.  Atherosclerosis      DX-CHEST-LIMITED (1 VIEW)   Final Result      1.  Right-sided temporary dialysis catheter terminates with the tip projecting over the expected location of the mid to proximal SVC.   2.  Small to moderate right and small left pleural effusions with associated compressive atelectasis and/or consolidation.      CT-HEAD W/O   Final Result      1.  Diffuse atrophy and white matter changes.   2.  No acute intracranial hemorrhage or territorial infarct.   3.  Small chronic infarcts.               DX-CHEST-PORTABLE (1 VIEW)   Final Result      Right IJ catheter terminates in the region of the SVC. No pneumothorax.      Bibasilar opacities.           Assessment/Plan  * Hyperkalemia- (present on admission)  Assessment & Plan  Secondary to THERESA/CKD  Now on iHD  Monitor daily    Potassium 3.7  Holding off on dialysis today  Follow-up BMP in a.m.    Advance care planning  Assessment & Plan  70yo PMHx COPD, chronic hypoxic respiratory failure on 6 LNC baseline, HTN, DM, CKD IV, hypothyroidism, CAD, currently living in a SNF in [place], who is transferred here for significant THERESA on CKD and profound hyperkalemia requiring dialysis. Discussed goal of care and codes status with patient. Patient has medical capacity. He confirms FULL code. ACP 16 mins    Bacterial pneumonia- (present on admission)  Assessment & Plan  Completed course of amp/sulbactam  Cultures neg    Shock (HCC)  Assessment & Plan  Did have short episode of shock that did require Levophed however this has been off for over 24 hours and now blood pressure has increased  Restarting home antihypertensive    Toxic metabolic  encephalopathy  Assessment & Plan  Patient continues to be encephalopathic  Likely uremic due to acute on chronic kidney disease  Also possibly secondary to pneumonia  Continue Unasyn  Hemodialysis per nephrology  CT head showed nothing acute    1/31  Overnight associated with severe agitation  Patient was placed in restraints and Precedex drip was initiated    2/1  Agitation has improved yet patient remains confused  I am unsure what his baseline is  No need for Precedex drip or restraints    2/6  Mild confusion but overall much improved    Appears to be back to baseline    Acute on chronic respiratory failure with hypoxia and hypercapnia (HCC)  Assessment & Plan  Patient does have a history of COPD and does require 6 L of oxygen at baseline   CT chest showed changes consistent with right lower lobe pneumonia   Patient has been started on Unasyn and azithromycin   Has improved now back to his baseline    Patient is back to baseline 6 L of oxygen by nasal cannula      Acute on chronic renal failure (HCC)  Assessment & Plan  CKD IV baseline now with THERESA likely ATN and requiring HD  Associated with severe hyperkalemia   Nephrology consulted, temporary dialysis catheter was placed 1/29  Making good UOP  Repeat BMP daily  Uncertain prognosis for renal recovery  Renally dose medications as appropriate    Continue monitoring    Hypertension, essential- (present on admission)  Assessment & Plan  On home amlodipine  As needed hydralazine  Adjust as needed    Abdominal aortic aneurysm (AAA) without rupture (HCC)- (present on admission)  Assessment & Plan  Continue outpatient follow-up   No acute worsening   Does appear at baseline it is around 5.5 cm     Monitoring for any chest pain or abdominal pain         VTE prophylaxis: Heparin    I have performed a physical exam and reviewed and updated ROS and Plan today (2/9/2025). In review of yesterday's note (2/8/2025), there are no changes except as documented above.    My total  time spent caring for the patient on the day of the encounter was 37 minutes.   This does not include time spent on separately billable procedures/tests.

## 2025-02-09 NOTE — PROGRESS NOTES
Hospital Medicine Daily Progress Note    Date of Service  2/8/2025    Chief Complaint  Pb Peters is a 69 y.o. male admitted 1/29/2025 with altered, hypoxic    Hospital Course  70yo PMHx COPD, chronic hypoxic respiratory failure on 6 LNC baseline, HTN, DM, CKD IV, hypothyroidism, CAD, currently living in a SNF in [place].  Presented to an [facility] hospital with hypercapnic resp failuure, pneumonia, THERESA on CKD with Cr 6.57 and hyperkalemia with Cr 7.8. He was given hyperkalemia protocol of IV calcium, albuterol and insulin.  A CT of the head was performed for his altered mental status which showed no acute abnormalities, CT of his abdomen showed no obstructing stone however atrophied kidneys and stable calcified infrarenal AAA, the chest portion of his CT abdomen did show a right lower lobe infiltrate and bilateral effusions and he has been given some for the possible pneumonia.  He was started on BiPAP to treat his hypercapnia.  He was also given IV Bicarbonate for his acidosis.   He was transferred here for emergent HD.     Nephrology was consulted. HD catheter placed on 1/29    Interval Problem Update  -Notes were extensively reviewed from primary team, radiology, ED, surgery, specialists, etc.   -Reviewed labs to date, microbiology for current admit and prior  -Imaging was independently reviewed and interpreted    Seen patient at bedside  Currently on 3L O2.   Denies chest pain, sob  Labs reviewed Cr 1.54. HD held today  Discussed with nephrology, if Cr remains stable, may consider holding HD  Continue monitor renal function daily    I have discussed this patient's plan of care and discharge plan at IDT rounds today with Case Management, Nursing, Nursing leadership, and other members of the IDT team.    Consultants/Specialty  nephrology    Code Status  Full Code    Disposition  The patient is not medically cleared for discharge to home or a post-acute facility.      I have placed the appropriate orders  for post-discharge needs.    Review of Systems  Review of Systems   Constitutional:  Negative for chills and fever.   Respiratory:  Negative for cough and shortness of breath.    Cardiovascular:  Negative for chest pain, palpitations and leg swelling.   Gastrointestinal:  Negative for abdominal pain, diarrhea, nausea and vomiting.   Genitourinary:  Negative for dysuria and urgency.   Musculoskeletal:  Negative for back pain.   Skin:  Negative for rash.   Neurological:  Negative for dizziness, loss of consciousness and headaches.        Physical Exam  Temp:  [36.4 °C (97.5 °F)-36.9 °C (98.4 °F)] 36.6 °C (97.9 °F)  Pulse:  [70-76] 76  Resp:  [16-18] 16  BP: (138-157)/(59-71) 157/68  SpO2:  [96 %-99 %] 96 %    Physical Exam  Constitutional:       General: He is not in acute distress.     Appearance: He is well-developed. He is not diaphoretic.   HENT:      Head: Normocephalic and atraumatic.   Eyes:      Conjunctiva/sclera: Conjunctivae normal.   Neck:      Vascular: No JVD.   Cardiovascular:      Rate and Rhythm: Normal rate.      Heart sounds: No murmur heard.     No gallop.   Pulmonary:      Effort: Pulmonary effort is normal. No respiratory distress.      Breath sounds: No stridor. No wheezing or rales.   Abdominal:      Palpations: Abdomen is soft.      Tenderness: There is no abdominal tenderness. There is no guarding or rebound.   Skin:     General: Skin is warm and dry.      Findings: No rash.   Neurological:      Mental Status: He is oriented to person, place, and time.   Psychiatric:         Thought Content: Thought content normal.         Fluids    Intake/Output Summary (Last 24 hours) at 2/8/2025 1737  Last data filed at 2/8/2025 1630  Gross per 24 hour   Intake 1378 ml   Output 1550 ml   Net -172 ml        Laboratory  Recent Labs     02/06/25  0424 02/07/25  0351 02/08/25  0128   WBC 7.9 9.6 8.8   RBC 2.76* 2.94* 3.02*   HEMOGLOBIN 8.3* 8.9* 9.1*   HEMATOCRIT 26.8* 28.3* 29.1*   MCV 97.1 96.3 96.4   MCH  30.1 30.3 30.1   MCHC 31.0* 31.4* 31.3*   RDW 61.6* 62.3* 61.6*   PLATELETCT 114* 121* 124*   MPV 11.4 11.9 11.8     Recent Labs     02/06/25  0424 02/07/25  0351 02/08/25  0128   SODIUM 135 137 137   POTASSIUM 3.7 3.7 3.8   CHLORIDE 101 102 103   CO2 26 28 28   GLUCOSE 88 99 97   BUN 23* 19 18   CREATININE 2.03* 1.70* 1.54*   CALCIUM 8.4* 8.5 8.9                   Imaging  DX-CHEST-LIMITED (1 VIEW)   Final Result         1.  Pulmonary edema and/or infiltrates, stable since prior study.   2.  Trace bilateral pleural effusions   3.  Atherosclerosis      DX-CHEST-LIMITED (1 VIEW)   Final Result      1.  Right-sided temporary dialysis catheter terminates with the tip projecting over the expected location of the mid to proximal SVC.   2.  Small to moderate right and small left pleural effusions with associated compressive atelectasis and/or consolidation.      CT-HEAD W/O   Final Result      1.  Diffuse atrophy and white matter changes.   2.  No acute intracranial hemorrhage or territorial infarct.   3.  Small chronic infarcts.               DX-CHEST-PORTABLE (1 VIEW)   Final Result      Right IJ catheter terminates in the region of the SVC. No pneumothorax.      Bibasilar opacities.           Assessment/Plan  * Hyperkalemia- (present on admission)  Assessment & Plan  Secondary to THERESA/CKD  Now on iHD  Monitor daily    Potassium 3.7  Holding off on dialysis today  Follow-up BMP in a.m.    Advance care planning  Assessment & Plan  68yo PMHx COPD, chronic hypoxic respiratory failure on 6 LNC baseline, HTN, DM, CKD IV, hypothyroidism, CAD, currently living in a SNF in [place], who is transferred here for significant THERESA on CKD and profound hyperkalemia requiring dialysis. Discussed goal of care and codes status with patient. Patient has medical capacity. He confirms FULL code. ACP 16 mins    Bacterial pneumonia- (present on admission)  Assessment & Plan  Completed course of amp/sulbactam  Cultures neg    Shock  ([facility])  Assessment & Plan  Did have short episode of shock that did require Levophed however this has been off for over 24 hours and now blood pressure has increased  Restarting home antihypertensive    Toxic metabolic encephalopathy  Assessment & Plan  Patient continues to be encephalopathic  Likely uremic due to acute on chronic kidney disease  Also possibly secondary to pneumonia  Continue Unasyn  Hemodialysis per nephrology  CT head showed nothing acute    1/31  Overnight associated with severe agitation  Patient was placed in restraints and Precedex drip was initiated    2/1  Agitation has improved yet patient remains confused  I am unsure what his baseline is  No need for Precedex drip or restraints    2/6  Mild confusion but overall much improved    Appears to be back to baseline    Acute on chronic respiratory failure with hypoxia and hypercapnia ([facility])  Assessment & Plan  Patient does have a history of COPD and does require 6 L of oxygen at baseline   CT chest showed changes consistent with right lower lobe pneumonia   Patient has been started on Unasyn and azithromycin   Has improved now back to his baseline    Patient is back to baseline 6 L of oxygen by nasal cannula      Acute on chronic renal failure ([facility])  Assessment & Plan  CKD IV baseline now with THERESA likely ATN and requiring HD  Associated with severe hyperkalemia   Nephrology consulted, temporary dialysis catheter was placed 1/29  Making good UOP  Repeat BMP daily  Uncertain prognosis for renal recovery  Renally dose medications as appropriate    Continue monitoring    Hypertension, essential- (present on admission)  Assessment & Plan  On home amlodipine  As needed hydralazine  Adjust as needed    Abdominal aortic aneurysm (AAA) without rupture ([facility])- (present on admission)  Assessment & Plan  Continue outpatient follow-up   No acute worsening   Does appear at baseline it is around 5.5 cm     Monitoring for any chest pain or abdominal pain          VTE prophylaxis: Heparin    I have performed a physical exam and reviewed and updated ROS and Plan today (2/8/2025). In review of yesterday's note (2/7/2025), there are no changes except as documented above.      Total time of 68 minutes spent prepping to see patient (e.g. reviewing  tests/imaging results, notes from consultants, bedside nurse, night shift ) obtaining and/or reviewing separately obtained history. Performing a medically appropriate examination and evaluation.  Counseling and educating the patient.  Ordering medications, tests, or procedures.  Referring and communicating with other health care professionals.  Documenting clinical information in EPIC.  Independently interpreting results and communicating results to patient.  Care coordination.

## 2025-02-09 NOTE — PROGRESS NOTES
[facility] -  PROGRESS NOTE               Author: Praneeth Lassiter M.D. Date & Time: 2/9/2025  10:42 AM     HPI:  This is a 69-year-old male with past medical history of HFpEF, COPD on 6L NC at baseline, CKD stage IV with last Scr in the office of 2.3, infrarenal AAA, blindness who lives in a skilled nursing facility in [place].  Over the last several days has been more confused with generalized weakness.  He was sent to the ER at [facility] in [place] where he was found to be tachypneic with a normal heart rate and blood pressure.  Labs showed acute on chronic kidney disease with a creatinine of 6.57 and a potassium of 7.8.  He was given hyperkalemia protocol of IV calcium, albuterol and insulin.  He became mildly hypotensive therefore was given 2 L of NS and a potassium was repeated which was 7.6.  Calcium and albuterol were repeated.  Last VBG showed a pH of 7.1, pCO2 74, PaO2 80, bicarb 27, base excess -2.  A CT of the head was performed for his altered mental status which showed no acute abnormalities, CT of his abdomen showed no obstructing stone however atrophied kidneys and stable calcified infrarenal AAA, the chest portion of his CT abdomen did show a right lower lobe infiltrate and bilateral effusions and he has been given some for the possible pneumonia.  He was started on BiPAP to treat his hypercapnia.  He was also given IV Bicarbonate for his acidosis. Goals of care were discussed by the sending physician with his family who states that the patient wanted everything done including dialysis, CPR, intubation.He was flighted to [facility] for advanced care. Nephrology was consulted for THERESA/Hyperkalemia from [place] and notified by [facility] on arrival. He is currently on BiPAP in the ICU. He is currently hemodynamically stable but his BP is soft with systolics in the low 90s.  Scr is 6.19 here and Bicarb is 25. Rest of BMP is pending. Patient is not able to provide history.  This information is obtained from the medical record.      DAILY NEPHROLOGY SUMMARY:  1/29 - consult done, had HD  1/30 - K improved to 5.8, SBP 100s-130s, no UOP recorded, on BiPAP, answers some questions, slightly confused, no complaints  1/31 - tolerated HD, UF 2L, SBP 80s-120s this AM, K 5.3, UOP 1.695L, off BiPAP this AM, no new c/o, feels SOB and edema improved, no n/v/d, frustrated by NPO status (awaiting swallow eval)  2/01: Seen this Am sitting up In bed eating pancakes for breakfast. Doing well without complaints awaiting for HD this AM.   2/02: Had a fall overnight. He is sleeping in his bed this AM. Feels pretty tired from all the dialysis per patient but agreeable to anything that is recommended when asked about taking a break. He continues to make goo urine and had 1.3L UOP even with Hd and UF of 2.5L yesterday. Will hold today. Will hold off on standing orders and allow day team to reassess tomorrow for HD needs and monitor renal recovery.   2/03: NAEO, no complaints, sitting in chair at bedside, feels same as yesterday  2/04: Creatinine worsening today (3.52, up from 2.6). Repeat UA ordered however having poor UOP. Quite somnolent this morning; in setting of increasing creatinine with poor UOP and encephalopathy resuming dialysis due to possible uremic encephalopathy   2/05: NAEO, avoided intubation, tolerated BiPAP; Woke up a bit after HD yesterday; [facility]ICU  2/06: NAEO. Tolerated 3 hrs of UF yesterday, scheduled HD today due to oliguria. Creatinine approaching 2, though UOP documented as 250 cc last 24 hrs. Mentation much improved, patient feels well, no complaints. Transferred out of ICU to telemetry   2/07: NAEO,  cc (0.3 cc/kg/hr). Dialysis yesterday (2L net UF). Cr showing good response to dialysis sessions. Complaining of difficult to clear sputum but breathing otherwise feels about the same.   2/08: NAEO, no complaints, breathing better, feels about same  2/09: NAEO, no complaints, feels  better, happy about her labs    REVIEW OF SYSTEMS:    10 point ROS reviewed and is as per HPI/daily summary or otherwise negative    PMH/PSH/SH/FH: Reviewed and unchanged since admission note  CURRENT MEDICATIONS: Reviewed from admission to present day    VS:  BP (!) 142/72 Comment: Rn aware  Pulse 71   Temp 36.4 °C (97.6 °F) (Temporal)   Resp 18   Ht 1.829 m (6')   Wt 80.2 kg (176 lb 12.9 oz)   SpO2 91%   BMI 23.98 kg/m²     Physical Exam  Nursing note reviewed.   Constitutional:       Appearance: Normal appearance. He is ill-appearing. He is not toxic-appearing.      Interventions: Face mask in place.   Eyes:      General: No scleral icterus.  Cardiovascular:      Rate and Rhythm: Normal rate.      Comments: No edema  Pulmonary:      Effort: Pulmonary effort is normal.   Abdominal:      General: There is no distension.   Musculoskeletal:         General: No deformity.      Right lower leg: No edema.      Left lower leg: No edema.   Skin:     Findings: No rash.   Neurological:      Mental Status: He is alert and easily aroused.   Psychiatric:         Behavior: Behavior is cooperative.         FLUID BALANCE:  In: 1290 [P.O.:1290]  Out: 1000     LABS:  Recent Labs     02/07/25  0351 02/08/25  0128 02/09/25  0719   SODIUM 137 137 137   POTASSIUM 3.7 3.8 4.4   CHLORIDE 102 103 103   CO2 28 28 27   GLUCOSE 99 97 91   BUN 19 18 19   CREATININE 1.70* 1.54* 1.46*   CALCIUM 8.5 8.9 8.8     Recent Labs     02/07/25  0351 02/08/25  0128 02/09/25  0934   WBC 9.6 8.8 8.0   RBC 2.94* 3.02* 3.14*   HEMOGLOBIN 8.9* 9.1* 9.5*   HEMATOCRIT 28.3* 29.1* 30.1*   MCV 96.3 96.4 95.9   MCH 30.3 30.1 30.3   MCHC 31.4* 31.3* 31.6*   RDW 62.3* 61.6* 59.9*   PLATELETCT 121* 124* 133*   MPV 11.9 11.8 10.8     IMPRESSION:  # THERESA-DD  - Etiology is unclear, but suspect ATN  - HD x 3 through 2/1  - SCr significantly uptrending with trial of holding dialysis 2/2-2/3 so HD resumed 2/4; UOP oliguric  # CKD Stage IV 2/2 HTN and Ischemic  Nephropathy  # Acidosis, improved  # Hyperkalemia, Resolved  # Hypercapnea  # HTN  # Acute on chronic resp failure  - initial concern for PNA, on abx, blood cx NGTD  # Encephalopathy, resolved  -Concern for uremic encephalopathy though likely multifactorial given concomitant CO2 retention requiring BiPAP  # Hypervolemia, Resolved  -Initially severely overloaded and resistant to diuretics , improved  -history of challenging to control volume  # CAD  # HFpEF  # AAA <4 cm  # HLD  # Hypothyroidism  # Hypoalbuminemia  # Anemia  - ferritin 313, %sat 28  - hgb goal 10-11, currently at goal  # CKD MBD  - Ca wnl phos wnl  - PTH 2/1/25= 187     PLAN:  - iHD held today  - Continue holding iHD to monitor renal recovery  - Dose all meds per eGFR < 15  - Monitor I/Os  - Daily labs  - Low sodium diet

## 2025-02-09 NOTE — CARE PLAN
The patient is Stable - Low risk of patient condition declining or worsening    Shift Goals  Clinical Goals: Patient safety/mobility  Patient Goals: Rest  Family Goals: DANICA    Progress made toward(s) clinical / shift goals:  Patient medicated for pain , Compliant with POC, assisted with meal, Resting comfortably, bed in lowest position, call light within reach. On O2 at 3LPM.     Patient is not progressing towards the following goals:

## 2025-02-09 NOTE — ASSESSMENT & PLAN NOTE
70yo PMHx COPD, chronic hypoxic respiratory failure on 6 LNC baseline, HTN, DM, CKD IV, hypothyroidism, CAD, currently living in a SNF in [place], who is transferred here for significant THERESA on CKD and profound hyperkalemia requiring dialysis. Discussed goal of care and codes status with patient. Patient has medical capacity. He confirms FULL code. ACP 16 mins

## 2025-02-10 LAB
ALBUMIN SERPL BCP-MCNC: 3 G/DL (ref 3.2–4.9)
BUN SERPL-MCNC: 19 MG/DL (ref 8–22)
CALCIUM ALBUM COR SERPL-MCNC: 9.9 MG/DL (ref 8.5–10.5)
CALCIUM SERPL-MCNC: 9.1 MG/DL (ref 8.5–10.5)
CHLORIDE SERPL-SCNC: 101 MMOL/L (ref 96–112)
CO2 SERPL-SCNC: 28 MMOL/L (ref 20–33)
CREAT SERPL-MCNC: 1.55 MG/DL (ref 0.5–1.4)
GFR SERPLBLD CREATININE-BSD FMLA CKD-EPI: 48 ML/MIN/1.73 M 2
GLUCOSE SERPL-MCNC: 111 MG/DL (ref 65–99)
PHOSPHATE SERPL-MCNC: 2.5 MG/DL (ref 2.5–4.5)
POTASSIUM SERPL-SCNC: 4.5 MMOL/L (ref 3.6–5.5)
SODIUM SERPL-SCNC: 137 MMOL/L (ref 135–145)

## 2025-02-10 PROCEDURE — 97116 GAIT TRAINING THERAPY: CPT

## 2025-02-10 PROCEDURE — 97530 THERAPEUTIC ACTIVITIES: CPT

## 2025-02-10 PROCEDURE — A9270 NON-COVERED ITEM OR SERVICE: HCPCS | Performed by: INTERNAL MEDICINE

## 2025-02-10 PROCEDURE — 700102 HCHG RX REV CODE 250 W/ 637 OVERRIDE(OP): Performed by: STUDENT IN AN ORGANIZED HEALTH CARE EDUCATION/TRAINING PROGRAM

## 2025-02-10 PROCEDURE — 700102 HCHG RX REV CODE 250 W/ 637 OVERRIDE(OP): Performed by: INTERNAL MEDICINE

## 2025-02-10 PROCEDURE — 99232 SBSQ HOSP IP/OBS MODERATE 35: CPT | Performed by: STUDENT IN AN ORGANIZED HEALTH CARE EDUCATION/TRAINING PROGRAM

## 2025-02-10 PROCEDURE — 700101 HCHG RX REV CODE 250

## 2025-02-10 PROCEDURE — A9270 NON-COVERED ITEM OR SERVICE: HCPCS | Performed by: STUDENT IN AN ORGANIZED HEALTH CARE EDUCATION/TRAINING PROGRAM

## 2025-02-10 PROCEDURE — 36415 COLL VENOUS BLD VENIPUNCTURE: CPT

## 2025-02-10 PROCEDURE — 770006 HCHG ROOM/CARE - MED/SURG/GYN SEMI*

## 2025-02-10 PROCEDURE — 700102 HCHG RX REV CODE 250 W/ 637 OVERRIDE(OP)

## 2025-02-10 PROCEDURE — 700102 HCHG RX REV CODE 250 W/ 637 OVERRIDE(OP): Performed by: HOSPITALIST

## 2025-02-10 PROCEDURE — 80069 RENAL FUNCTION PANEL: CPT

## 2025-02-10 PROCEDURE — A9270 NON-COVERED ITEM OR SERVICE: HCPCS | Performed by: HOSPITALIST

## 2025-02-10 PROCEDURE — 700102 HCHG RX REV CODE 250 W/ 637 OVERRIDE(OP): Performed by: EMERGENCY MEDICINE

## 2025-02-10 PROCEDURE — A9270 NON-COVERED ITEM OR SERVICE: HCPCS

## 2025-02-10 PROCEDURE — 700111 HCHG RX REV CODE 636 W/ 250 OVERRIDE (IP): Performed by: INTERNAL MEDICINE

## 2025-02-10 PROCEDURE — A9270 NON-COVERED ITEM OR SERVICE: HCPCS | Performed by: EMERGENCY MEDICINE

## 2025-02-10 RX ADMIN — OXYCODONE 5 MG: 5 TABLET ORAL at 04:47

## 2025-02-10 RX ADMIN — ATORVASTATIN CALCIUM 40 MG: 40 TABLET, FILM COATED ORAL at 17:11

## 2025-02-10 RX ADMIN — CLOPIDOGREL BISULFATE 75 MG: 75 TABLET, FILM COATED ORAL at 04:48

## 2025-02-10 RX ADMIN — LEVOTHYROXINE SODIUM 125 MCG: 0.12 TABLET ORAL at 04:47

## 2025-02-10 RX ADMIN — OXYCODONE 5 MG: 5 TABLET ORAL at 21:22

## 2025-02-10 RX ADMIN — HEPARIN SODIUM 5000 UNITS: 5000 INJECTION, SOLUTION INTRAVENOUS; SUBCUTANEOUS at 14:32

## 2025-02-10 RX ADMIN — LIDOCAINE 1 PATCH: 4 PATCH TOPICAL at 22:18

## 2025-02-10 RX ADMIN — GABAPENTIN 200 MG: 100 CAPSULE ORAL at 04:47

## 2025-02-10 RX ADMIN — PRAMIPEXOLE DIHYDROCHLORIDE 0.5 MG: 0.5 TABLET ORAL at 04:48

## 2025-02-10 RX ADMIN — HEPARIN SODIUM 5000 UNITS: 5000 INJECTION, SOLUTION INTRAVENOUS; SUBCUTANEOUS at 21:17

## 2025-02-10 RX ADMIN — AMLODIPINE BESYLATE 10 MG: 10 TABLET ORAL at 04:48

## 2025-02-10 RX ADMIN — ISOSORBIDE MONONITRATE 240 MG: 60 TABLET, EXTENDED RELEASE ORAL at 04:48

## 2025-02-10 RX ADMIN — GUAIFENESIN 600 MG: 600 TABLET, EXTENDED RELEASE ORAL at 17:11

## 2025-02-10 RX ADMIN — SENNOSIDES AND DOCUSATE SODIUM 2 TABLET: 50; 8.6 TABLET ORAL at 17:11

## 2025-02-10 RX ADMIN — UMECLIDINIUM BROMIDE AND VILANTEROL TRIFENATATE 1 PUFF: 62.5; 25 POWDER RESPIRATORY (INHALATION) at 04:47

## 2025-02-10 RX ADMIN — HEPARIN SODIUM 5000 UNITS: 5000 INJECTION, SOLUTION INTRAVENOUS; SUBCUTANEOUS at 04:48

## 2025-02-10 RX ADMIN — GUAIFENESIN 600 MG: 600 TABLET, EXTENDED RELEASE ORAL at 04:47

## 2025-02-10 ASSESSMENT — ENCOUNTER SYMPTOMS
HEADACHES: 0
NAUSEA: 0
BACK PAIN: 0
PALPITATIONS: 0
COUGH: 0
FEVER: 0
DIARRHEA: 0
ABDOMINAL PAIN: 0
SHORTNESS OF BREATH: 0
VOMITING: 0
DIZZINESS: 0
CHILLS: 0
LOSS OF CONSCIOUSNESS: 0

## 2025-02-10 ASSESSMENT — COGNITIVE AND FUNCTIONAL STATUS - GENERAL
MOBILITY SCORE: 22
SUGGESTED CMS G CODE MODIFIER DAILY ACTIVITY: CH
WALKING IN HOSPITAL ROOM: A LITTLE
SUGGESTED CMS G CODE MODIFIER MOBILITY: CJ
DAILY ACTIVITIY SCORE: 24
CLIMB 3 TO 5 STEPS WITH RAILING: A LITTLE

## 2025-02-10 ASSESSMENT — GAIT ASSESSMENTS
DISTANCE (FEET): 75
GAIT LEVEL OF ASSIST: CONTACT GUARD ASSIST
ASSISTIVE DEVICE: FRONT WHEEL WALKER

## 2025-02-10 ASSESSMENT — PAIN DESCRIPTION - PAIN TYPE
TYPE: ACUTE PAIN

## 2025-02-10 NOTE — THERAPY
Occupational Therapy  Daily Treatment     Patient Name: Pb Peters  Age:  69 y.o., Sex:  male  Medical Record #: [id number]  Today's Date: 2/10/2025     Precautions  Precautions: Fall Risk  Comments: blindness    Assessment    Pt is at or near his/her functional baseline. Pt with no further skilled OT needs in the acute care setting at this time.      Plan    Treatment Plan Status:    Type of Treatment:    Treatment Frequency:    Treatment Duration: (P) Discharge Needs Only    DC Equipment Recommendations: (P) None  Discharge Recommendations: (P) Anticipate that the patient will have no further occupational therapy needs after discharge from the hospital       02/10/25 1129   Balance   Sitting Balance (Static) Fair +   Sitting Balance (Dynamic) Fair   Standing Balance (Static) Fair   Standing Balance (Dynamic) Fair -   Weight Shift Sitting Fair   Weight Shift Standing Fair   Bed Mobility    Supine to Sit Supervised   Sit to Supine   (in chair)   Scooting Supervised   Rolling Supervised   Activities of Daily Living   Grooming Supervision   Upper Body Dressing Supervision   Lower Body Dressing Standby Assist   Functional Mobility   Sit to Stand Standby Assist   Bed, Chair, Wheelchair Transfer Standby Assist   Occupational Therapy Treatment Plan    Duration Discharge Needs Only   Reason For Discharge   (pt at his baseline)   Anticipated Discharge Equipment and Recommendations   DC Equipment Recommendations None   Discharge Recommendations Anticipate that the patient will have no further occupational therapy needs after discharge from the hospital

## 2025-02-10 NOTE — PROGRESS NOTES
Hospital Medicine Daily Progress Note    Date of Service  2/10/2025    Chief Complaint  Pb Peters is a 69 y.o. male admitted 1/29/2025 with altered, hypoxic    Hospital Course  68yo PMHx COPD, chronic hypoxic respiratory failure on 6 LNC baseline, HTN, DM, CKD IV, hypothyroidism, CAD, currently living in a SNF in [place].  Presented to an [facility] hospital with hypercapnic resp failuure, pneumonia, THERESA on CKD with Cr 6.57 and hyperkalemia with Cr 7.8. He was given hyperkalemia protocol of IV calcium, albuterol and insulin.  A CT of the head was performed for his altered mental status which showed no acute abnormalities, CT of his abdomen showed no obstructing stone however atrophied kidneys and stable calcified infrarenal AAA, the chest portion of his CT abdomen did show a right lower lobe infiltrate and bilateral effusions and he has been given some for the possible pneumonia.  He was started on BiPAP to treat his hypercapnia.  He was also given IV Bicarbonate for his acidosis.   He was transferred here for emergent HD.     Nephrology was consulted. HD catheter placed on 1/29    Interval Problem Update  Seen patient at bedside  Currently on 3L O2.   Cr 1.55. discussed with nephrology, off HD, continue monitoring  PT/OT  Walking O2  Continue monitor renal function daily    I have discussed this patient's plan of care and discharge plan at IDT rounds today with Case Management, Nursing, Nursing leadership, and other members of the IDT team.    Consultants/Specialty  nephrology    Code Status  Full Code    Disposition  The patient is not medically cleared for discharge to home or a post-acute facility.      I have placed the appropriate orders for post-discharge needs.    Review of Systems  Review of Systems   Constitutional:  Negative for chills and fever.   Respiratory:  Negative for cough and shortness of breath.    Cardiovascular:  Negative for chest pain, palpitations and leg swelling.    Gastrointestinal:  Negative for abdominal pain, diarrhea, nausea and vomiting.   Genitourinary:  Negative for dysuria and urgency.   Musculoskeletal:  Negative for back pain.   Skin:  Negative for rash.   Neurological:  Negative for dizziness, loss of consciousness and headaches.        Physical Exam  Temp:  [36.1 °C (97 °F)-36.8 °C (98.3 °F)] 36.8 °C (98.3 °F)  Pulse:  [72-77] 72  Resp:  [16-18] 16  BP: (115-157)/(60-77) 140/60  SpO2:  [92 %-94 %] 93 %    Physical Exam  Constitutional:       General: He is not in acute distress.     Appearance: He is well-developed. He is not diaphoretic.   HENT:      Head: Normocephalic and atraumatic.   Eyes:      Conjunctiva/sclera: Conjunctivae normal.   Neck:      Vascular: No JVD.   Cardiovascular:      Rate and Rhythm: Normal rate.      Heart sounds: No murmur heard.     No gallop.   Pulmonary:      Effort: Pulmonary effort is normal. No respiratory distress.      Breath sounds: No stridor. No wheezing or rales.   Abdominal:      Palpations: Abdomen is soft.      Tenderness: There is no abdominal tenderness. There is no guarding or rebound.   Skin:     General: Skin is warm and dry.      Findings: No rash.   Neurological:      Mental Status: He is oriented to person, place, and time.   Psychiatric:         Thought Content: Thought content normal.         Fluids    Intake/Output Summary (Last 24 hours) at 2/10/2025 1513  Last data filed at 2/10/2025 0900  Gross per 24 hour   Intake 720 ml   Output 1200 ml   Net -480 ml        Laboratory  Recent Labs     02/08/25  0128 02/09/25  0934   WBC 8.8 8.0   RBC 3.02* 3.14*   HEMOGLOBIN 9.1* 9.5*   HEMATOCRIT 29.1* 30.1*   MCV 96.4 95.9   MCH 30.1 30.3   MCHC 31.3* 31.6*   RDW 61.6* 59.9*   PLATELETCT 124* 133*   MPV 11.8 10.8     Recent Labs     02/08/25  0128 02/09/25  0719 02/10/25  0212   SODIUM 137 137 137   POTASSIUM 3.8 4.4 4.5   CHLORIDE 103 103 101   CO2 28 27 28   GLUCOSE 97 91 111*   BUN 18 19 19   CREATININE 1.54* 1.46*  1.55*   CALCIUM 8.9 8.8 9.1                   Imaging  DX-CHEST-LIMITED (1 VIEW)   Final Result         1.  Pulmonary edema and/or infiltrates, stable since prior study.   2.  Trace bilateral pleural effusions   3.  Atherosclerosis      DX-CHEST-LIMITED (1 VIEW)   Final Result      1.  Right-sided temporary dialysis catheter terminates with the tip projecting over the expected location of the mid to proximal SVC.   2.  Small to moderate right and small left pleural effusions with associated compressive atelectasis and/or consolidation.      CT-HEAD W/O   Final Result      1.  Diffuse atrophy and white matter changes.   2.  No acute intracranial hemorrhage or territorial infarct.   3.  Small chronic infarcts.               DX-CHEST-PORTABLE (1 VIEW)   Final Result      Right IJ catheter terminates in the region of the SVC. No pneumothorax.      Bibasilar opacities.           Assessment/Plan  * Hyperkalemia- (present on admission)  Assessment & Plan  Secondary to THERESA/CKD  Now on iHD  Monitor daily    Potassium 3.7  Holding off on dialysis today  Follow-up BMP in a.m.    Advance care planning  Assessment & Plan  70yo PMHx COPD, chronic hypoxic respiratory failure on 6 LNC baseline, HTN, DM, CKD IV, hypothyroidism, CAD, currently living in a SNF in [place], who is transferred here for significant THERESA on CKD and profound hyperkalemia requiring dialysis. Discussed goal of care and codes status with patient. Patient has medical capacity. He confirms FULL code. ACP 16 mins    Bacterial pneumonia- (present on admission)  Assessment & Plan  Completed course of amp/sulbactam  Cultures neg    Shock (HCC)  Assessment & Plan  Did have short episode of shock that did require Levophed however this has been off for over 24 hours and now blood pressure has increased  Restarting home antihypertensive    Toxic metabolic encephalopathy  Assessment & Plan  Patient continues to be encephalopathic  Likely uremic due to acute on chronic kidney  disease  Also possibly secondary to pneumonia  Continue Unasyn  Hemodialysis per nephrology  CT head showed nothing acute    1/31  Overnight associated with severe agitation  Patient was placed in restraints and Precedex drip was initiated    2/1  Agitation has improved yet patient remains confused  I am unsure what his baseline is  No need for Precedex drip or restraints    2/6  Mild confusion but overall much improved    Appears to be back to baseline    Acute on chronic respiratory failure with hypoxia and hypercapnia (HCC)  Assessment & Plan  Patient does have a history of COPD and does require 6 L of oxygen at baseline   CT chest showed changes consistent with right lower lobe pneumonia   Patient has been started on Unasyn and azithromycin   Has improved now back to his baseline    Patient is back to baseline 6 L of oxygen by nasal cannula      Acute on chronic renal failure (HCC)  Assessment & Plan  CKD IV baseline now with THERESA likely ATN and requiring HD  Associated with severe hyperkalemia   Nephrology consulted, temporary dialysis catheter was placed 1/29  Making good UOP  Repeat BMP daily  Uncertain prognosis for renal recovery  Renally dose medications as appropriate    Continue monitoring    Hypertension, essential- (present on admission)  Assessment & Plan  On home amlodipine  As needed hydralazine  Adjust as needed    Abdominal aortic aneurysm (AAA) without rupture (HCC)- (present on admission)  Assessment & Plan  Continue outpatient follow-up   No acute worsening   Does appear at baseline it is around 5.5 cm     Monitoring for any chest pain or abdominal pain         VTE prophylaxis: Heparin    I have performed a physical exam and reviewed and updated ROS and Plan today (2/10/2025). In review of yesterday's note (2/9/2025), there are no changes except as documented above.    My total time spent caring for the patient on the day of the encounter was 39 minutes.   This does not include time spent on  separately billable procedures/tests.

## 2025-02-10 NOTE — DISCHARGE PLANNING
@1004  [facility]  Spoke To: Dorie  Outcome: DPA called to see if pt could return to facility.  Dorie has not received a referral and is not a LTC pt at the facility.  DPA stated a referral will be faxed over.  DPA faxed via comm mgt to [facility] #[phone].\    Pt is from [facility]    Agency/Facility Name: [facility]  Spoke To: Dedra  Outcome: The facility can't return to [facility].  Care exceeds capacity.  Acquity too high.     @1017  Agency/Facility Name: [facility]  Spoke To: Blance  Outcome: DPA called and asked Dorie to disregard referral.  Referral should have gone to [facility].    @1018  Agency/Facility Name: [facility]  Spoke To: Meghann  Outcome: Meghann will check with Sonal and give this DPA a call back.      @1024  Agency/Facility Name: [facility]  Spoke To: Meghann  Outcome: When Nereyda with [facility] received referral, pt did not meet criteria for LTAC.  If pt now meets criteria, re-send referral to [facility].

## 2025-02-10 NOTE — THERAPY
Physical Therapy   Daily Treatment     Patient Name: Pb Peters  Age:  69 y.o., Sex:  male  Medical Record #: [id number]  Today's Date: 2/10/2025     Precautions  Precautions: Fall Risk  Comments:  (visually impaired)    Assessment    Pt d/c'd from PT on 2/7/2025.  PT was asked to see this pt again for d/c needs.  He lives alone in a small 5th wheel, where he was mobile w/o AD.  His daughter lives 60 ft away and comes over daily to assist as needed.  He is visually impaired.  He reports very limited household ambulator, going onle 2-3 ft at a time.  Today, he is able to mobilize at what appears to be his PLOF.  He is able to perform stairs.  He needs tactile assist only due to his visual impairment.  No acute PT needs.    Plan    Treatment Plan Status:    Type of Treatment:    Treatment Frequency:    Treatment Duration: Discharge Needs Only    DC Equipment Recommendations: None  Discharge Recommendations: Anticipate that the patient will have no further physical therapy needs after discharge from the hospital       Objective       02/10/25 1451   Balance   Sitting Balance (Static) Fair +   Sitting Balance (Dynamic) Fair +   Standing Balance (Static) Fair   Standing Balance (Dynamic) Fair   Weight Shift Sitting Good   Weight Shift Standing Good   Skilled Intervention Verbal Cuing   Bed Mobility    Supine to Sit Supervised   Sit to Supine Standby Assist   Gait Analysis   Gait Level Of Assist Contact Guard Assist   Assistive Device Front Wheel Walker   Distance (Feet) 75   Skilled Intervention Verbal Cuing;Tactile Cuing   Functional Mobility   Sit to Stand Standby Assist   Bed, Chair, Wheelchair Transfer Standby Assist   Physical Therapy Treatment Plan   Duration Discharge Needs Only   Anticipated Discharge Equipment and Recommendations   DC Equipment Recommendations None   Discharge Recommendations Anticipate that the patient will have no further physical therapy needs after discharge from the hospital

## 2025-02-10 NOTE — PROGRESS NOTES
[facility] -  PROGRESS NOTE               Author: Tarah Diana M.D. Date & Time: 2/10/2025  9:18 AM     HPI:  This is a 69-year-old male with past medical history of HFpEF, COPD on 6L NC at baseline, CKD stage IV with last Scr in the office of 2.3, infrarenal AAA, blindness who lives in a skilled nursing facility in [place].  Over the last several days has been more confused with generalized weakness.  He was sent to the ER at [facility] in [place] where he was found to be tachypneic with a normal heart rate and blood pressure.  Labs showed acute on chronic kidney disease with a creatinine of 6.57 and a potassium of 7.8.  He was given hyperkalemia protocol of IV calcium, albuterol and insulin.  He became mildly hypotensive therefore was given 2 L of NS and a potassium was repeated which was 7.6.  Calcium and albuterol were repeated.  Last VBG showed a pH of 7.1, pCO2 74, PaO2 80, bicarb 27, base excess -2.  A CT of the head was performed for his altered mental status which showed no acute abnormalities, CT of his abdomen showed no obstructing stone however atrophied kidneys and stable calcified infrarenal AAA, the chest portion of his CT abdomen did show a right lower lobe infiltrate and bilateral effusions and he has been given some for the possible pneumonia.  He was started on BiPAP to treat his hypercapnia.  He was also given IV Bicarbonate for his acidosis. Goals of care were discussed by the sending physician with his family who states that the patient wanted everything done including dialysis, CPR, intubation.He was flighted to [facility] for advanced care. Nephrology was consulted for THERESA/Hyperkalemia from [place] and notified by [facility] on arrival. He is currently on BiPAP in the ICU. He is currently hemodynamically stable but his BP is soft with systolics in the low 90s.  Scr is 6.19 here and Bicarb is 25. Rest of BMP is pending. Patient is not able to provide history.  This information is obtained from the medical record.      DAILY NEPHROLOGY SUMMARY:  1/29 - consult done, had HD  1/30 - K improved to 5.8, SBP 100s-130s, no UOP recorded, on BiPAP, answers some questions, slightly confused, no complaints  1/31 - tolerated HD, UF 2L, SBP 80s-120s this AM, K 5.3, UOP 1.695L, off BiPAP this AM, no new c/o, feels SOB and edema improved, no n/v/d, frustrated by NPO status (awaiting swallow eval)  2/01: Seen this Am sitting up In bed eating pancakes for breakfast. Doing well without complaints awaiting for HD this AM.   2/02: Had a fall overnight. He is sleeping in his bed this AM. Feels pretty tired from all the dialysis per patient but agreeable to anything that is recommended when asked about taking a break. He continues to make goo urine and had 1.3L UOP even with Hd and UF of 2.5L yesterday. Will hold today. Will hold off on standing orders and allow day team to reassess tomorrow for HD needs and monitor renal recovery.   2/03: NAEO, no complaints, sitting in chair at bedside, feels same as yesterday  2/04: Creatinine worsening today (3.52, up from 2.6). Repeat UA ordered however having poor UOP. Quite somnolent this morning; in setting of increasing creatinine with poor UOP and encephalopathy resuming dialysis due to possible uremic encephalopathy   2/05: NAEO, avoided intubation, tolerated BiPAP; Woke up a bit after HD yesterday; [facility]  2/06: NAEO. Tolerated 3 hrs of UF yesterday, scheduled HD today due to oliguria. Creatinine approaching 2, though UOP documented as 250 cc last 24 hrs. Mentation much improved, patient feels well, no complaints. Transferred out of ICU to telemetry   2/07: NAEO,  cc (0.3 cc/kg/hr). Dialysis yesterday (2L net UF). Cr showing good response to dialysis sessions. Complaining of difficult to clear sputum but breathing otherwise feels about the same.   2/08: NAEO, no complaints, breathing better, feels about same  2/09: NAEO, no complaints, feels  better, happy about his labs  2/10: Feeling well today, enjoying his coffee this morning. Cr slightly increased though essentially stable 1.55 (up from 1.46 yesterday). UOP picking up (1200 cc out last 24 hrs). Possibly discharging today     REVIEW OF SYSTEMS:    10 point ROS reviewed and is as per HPI/daily summary or otherwise negative    PMH/PSH/SH/FH: Reviewed and unchanged since admission note  CURRENT MEDICATIONS: Reviewed from admission to present day    VS:  BP (!) 140/60   Pulse 72   Temp 36.8 °C (98.3 °F) (Temporal)   Resp 16   Ht 1.829 m (6')   Wt 80.2 kg (176 lb 12.9 oz)   SpO2 93%   BMI 23.98 kg/m²     Physical Exam  Nursing note reviewed.   Constitutional:       Appearance: Normal appearance. He is ill-appearing (chronically ill appearing). He is not toxic-appearing.      Interventions: Face mask in place.   HENT:      Head: Normocephalic and atraumatic.   Eyes:      General: No scleral icterus.  Neck:      Comments: RIJ trialysis catheter in place  Cardiovascular:      Rate and Rhythm: Normal rate.   Pulmonary:      Effort: Pulmonary effort is normal. No respiratory distress.      Breath sounds: Normal breath sounds.   Abdominal:      General: There is no distension.   Musculoskeletal:         General: No deformity.      Right lower leg: No edema.      Left lower leg: No edema.   Skin:     Findings: No rash.   Neurological:      Mental Status: He is alert, oriented to person, place, and time and easily aroused.   Psychiatric:         Behavior: Behavior is cooperative.       FLUID BALANCE:  In: 720 [P.O.:720]  Out: 1200     LABS:  Recent Labs     02/08/25  0128 02/09/25  0719 02/10/25  0212   SODIUM 137 137 137   POTASSIUM 3.8 4.4 4.5   CHLORIDE 103 103 101   CO2 28 27 28   GLUCOSE 97 91 111*   BUN 18 19 19   CREATININE 1.54* 1.46* 1.55*   CALCIUM 8.9 8.8 9.1     Recent Labs     02/08/25  0128 02/09/25  0934   WBC 8.8 8.0   RBC 3.02* 3.14*   HEMOGLOBIN 9.1* 9.5*   HEMATOCRIT 29.1* 30.1*   MCV  96.4 95.9   MCH 30.1 30.3   MCHC 31.3* 31.6*   RDW 61.6* 59.9*   PLATELETCT 124* 133*   MPV 11.8 10.8     IMPRESSION:  # THERESA-DD  - Etiology is unclear, but suspect ATN  - HD x 3 through 2/1  - SCr significantly uptrending with trial of holding dialysis 2/2-2/3 so HD resumed 2/4-2/6; UOP oliguric at that time  #Oliguria   -Second to acute renal failure, resolved  # CKD Stage IV 2/2 HTN and Ischemic Nephropathy  # Acidosis, resolved  # Hyperkalemia, Resolved  # Hypercapnea, chronic   # HTN  # Acute on chronic resp failure  - initial concern for PNA, on abx, blood cx NGTD  -Resolved  # Encephalopathy, resolved  -Concern for uremic encephalopathy though likely multifactorial given concomitant CO2 retention requiring BiPAP  # Hypervolemia, Resolved  -Initially severely overloaded and resistant to diuretics , improved  -history of challenging to control volume  # CAD  # HFpEF  # AAA <4 cm  # HLD  # Hypothyroidism  # Hypoalbuminemia  # Anemia  - ferritin 313, %sat 28  - hgb goal 10-11, currently at goal  # CKD MBD  - Ca wnl phos wnl  - PTH 2/1/25= 187     PLAN:  - No need for iHD today  - Continue holding iHD to monitor renal recovery   - Monitor I/Os  - Daily labs  - Low sodium diet  - If remains inpatient today will see tomorrow to ensure stability in labs and need for further inpatient nephrology care. Otherwise has been referred to  Nephrology outpatient for outpatient follow up

## 2025-02-10 NOTE — CARE PLAN
The patient is Stable - Low risk of patient condition declining or worsening    Shift Goals  Clinical Goals: Pt will be free from falls or injury throughout the shift  Patient Goals: Comfort  Family Goals: DANICA    Progress made toward(s) clinical / shift goals:    Pt alert and able to make needs known. Pt denies any pain this shift. Set up provided for meals. Condom cath in place. Bed locked and in lowest position with bed alarm on. Pt reminded to turn throughout the shift. All needs met at this time.     Problem: Knowledge Deficit - Standard  Goal: Patient and family/care givers will demonstrate understanding of plan of care, disease process/condition, diagnostic tests and medications  Outcome: Progressing     Problem: Skin Integrity  Goal: Skin integrity is maintained or improved  Outcome: Progressing     Problem: Fall Risk  Goal: Patient will remain free from falls  Outcome: Progressing     Problem: Respiratory  Goal: Patient will achieve/maintain optimum respiratory ventilation and gas exchange  Outcome: Progressing     Problem: Pain - Standard  Goal: Alleviation of pain or a reduction in pain to the patient’s comfort goal  Outcome: Progressing       Patient is not progressing towards the following goals:

## 2025-02-10 NOTE — CARE PLAN
The patient is Stable - Low risk of patient condition declining or worsening    Shift Goals  Clinical Goals: Patient safety, remain free from fall this shift  Patient Goals: Rest/sleep  Family Goals: DANICA    Progress made toward(s) clinical / shift goals:   Patient medicated for pain , Compliant with POC, assisted with meal, Resting comfortably, bed in lowest position, call light within reach. On O2 at 3LPM.     Patient is not progressing towards the following goals:

## 2025-02-11 ENCOUNTER — PHARMACY VISIT (OUTPATIENT)
Dept: PHARMACY | Facility: MEDICAL CENTER | Age: 70
End: 2025-02-11
Payer: MEDICARE

## 2025-02-11 ENCOUNTER — PATIENT OUTREACH (OUTPATIENT)
Dept: SCHEDULING | Facility: IMAGING CENTER | Age: 70
End: 2025-02-11
Payer: MEDICARE

## 2025-02-11 LAB
ALBUMIN SERPL BCP-MCNC: 3 G/DL (ref 3.2–4.9)
BUN SERPL-MCNC: 18 MG/DL (ref 8–22)
CALCIUM ALBUM COR SERPL-MCNC: 9.8 MG/DL (ref 8.5–10.5)
CALCIUM SERPL-MCNC: 9 MG/DL (ref 8.5–10.5)
CHLORIDE SERPL-SCNC: 101 MMOL/L (ref 96–112)
CO2 SERPL-SCNC: 29 MMOL/L (ref 20–33)
CREAT SERPL-MCNC: 1.59 MG/DL (ref 0.5–1.4)
GFR SERPLBLD CREATININE-BSD FMLA CKD-EPI: 47 ML/MIN/1.73 M 2
GLUCOSE SERPL-MCNC: 87 MG/DL (ref 65–99)
PHOSPHATE SERPL-MCNC: 2.6 MG/DL (ref 2.5–4.5)
POTASSIUM SERPL-SCNC: 4.2 MMOL/L (ref 3.6–5.5)
SODIUM SERPL-SCNC: 139 MMOL/L (ref 135–145)

## 2025-02-11 PROCEDURE — 80069 RENAL FUNCTION PANEL: CPT

## 2025-02-11 PROCEDURE — A9270 NON-COVERED ITEM OR SERVICE: HCPCS | Performed by: INTERNAL MEDICINE

## 2025-02-11 PROCEDURE — 700102 HCHG RX REV CODE 250 W/ 637 OVERRIDE(OP): Performed by: STUDENT IN AN ORGANIZED HEALTH CARE EDUCATION/TRAINING PROGRAM

## 2025-02-11 PROCEDURE — RXMED WILLOW AMBULATORY MEDICATION CHARGE: Performed by: STUDENT IN AN ORGANIZED HEALTH CARE EDUCATION/TRAINING PROGRAM

## 2025-02-11 PROCEDURE — 99232 SBSQ HOSP IP/OBS MODERATE 35: CPT | Performed by: STUDENT IN AN ORGANIZED HEALTH CARE EDUCATION/TRAINING PROGRAM

## 2025-02-11 PROCEDURE — 700102 HCHG RX REV CODE 250 W/ 637 OVERRIDE(OP): Performed by: INTERNAL MEDICINE

## 2025-02-11 PROCEDURE — A9270 NON-COVERED ITEM OR SERVICE: HCPCS | Performed by: STUDENT IN AN ORGANIZED HEALTH CARE EDUCATION/TRAINING PROGRAM

## 2025-02-11 PROCEDURE — 700101 HCHG RX REV CODE 250

## 2025-02-11 PROCEDURE — 700102 HCHG RX REV CODE 250 W/ 637 OVERRIDE(OP)

## 2025-02-11 PROCEDURE — 700102 HCHG RX REV CODE 250 W/ 637 OVERRIDE(OP): Performed by: EMERGENCY MEDICINE

## 2025-02-11 PROCEDURE — 700111 HCHG RX REV CODE 636 W/ 250 OVERRIDE (IP): Performed by: INTERNAL MEDICINE

## 2025-02-11 PROCEDURE — 36415 COLL VENOUS BLD VENIPUNCTURE: CPT

## 2025-02-11 PROCEDURE — 770006 HCHG ROOM/CARE - MED/SURG/GYN SEMI*

## 2025-02-11 PROCEDURE — A9270 NON-COVERED ITEM OR SERVICE: HCPCS

## 2025-02-11 PROCEDURE — A9270 NON-COVERED ITEM OR SERVICE: HCPCS | Performed by: HOSPITALIST

## 2025-02-11 PROCEDURE — A9270 NON-COVERED ITEM OR SERVICE: HCPCS | Performed by: EMERGENCY MEDICINE

## 2025-02-11 PROCEDURE — 700102 HCHG RX REV CODE 250 W/ 637 OVERRIDE(OP): Performed by: HOSPITALIST

## 2025-02-11 RX ORDER — OXYCODONE HYDROCHLORIDE 5 MG/1
5 TABLET ORAL EVERY 8 HOURS PRN
Qty: 8 TABLET | Refills: 0 | Status: SHIPPED | OUTPATIENT
Start: 2025-02-11 | End: 2025-02-14

## 2025-02-11 RX ADMIN — LEVOTHYROXINE SODIUM 125 MCG: 0.12 TABLET ORAL at 04:05

## 2025-02-11 RX ADMIN — HEPARIN SODIUM 5000 UNITS: 5000 INJECTION, SOLUTION INTRAVENOUS; SUBCUTANEOUS at 04:04

## 2025-02-11 RX ADMIN — HEPARIN SODIUM 5000 UNITS: 5000 INJECTION, SOLUTION INTRAVENOUS; SUBCUTANEOUS at 23:11

## 2025-02-11 RX ADMIN — SENNOSIDES AND DOCUSATE SODIUM 2 TABLET: 50; 8.6 TABLET ORAL at 18:19

## 2025-02-11 RX ADMIN — UMECLIDINIUM BROMIDE AND VILANTEROL TRIFENATATE 1 PUFF: 62.5; 25 POWDER RESPIRATORY (INHALATION) at 04:04

## 2025-02-11 RX ADMIN — OXYCODONE 5 MG: 5 TABLET ORAL at 03:46

## 2025-02-11 RX ADMIN — GUAIFENESIN 600 MG: 600 TABLET, EXTENDED RELEASE ORAL at 04:05

## 2025-02-11 RX ADMIN — ISOSORBIDE MONONITRATE 240 MG: 60 TABLET, EXTENDED RELEASE ORAL at 04:05

## 2025-02-11 RX ADMIN — POLYETHYLENE GLYCOL 3350 1 PACKET: 17 POWDER, FOR SOLUTION ORAL at 12:24

## 2025-02-11 RX ADMIN — GUAIFENESIN 600 MG: 600 TABLET, EXTENDED RELEASE ORAL at 18:20

## 2025-02-11 RX ADMIN — OXYCODONE 5 MG: 5 TABLET ORAL at 19:59

## 2025-02-11 RX ADMIN — AMLODIPINE BESYLATE 10 MG: 10 TABLET ORAL at 04:05

## 2025-02-11 RX ADMIN — PRAMIPEXOLE DIHYDROCHLORIDE 0.5 MG: 0.5 TABLET ORAL at 04:05

## 2025-02-11 RX ADMIN — LIDOCAINE 1 PATCH: 4 PATCH TOPICAL at 23:11

## 2025-02-11 RX ADMIN — CLOPIDOGREL BISULFATE 75 MG: 75 TABLET, FILM COATED ORAL at 04:05

## 2025-02-11 RX ADMIN — GABAPENTIN 200 MG: 100 CAPSULE ORAL at 04:05

## 2025-02-11 RX ADMIN — ATORVASTATIN CALCIUM 40 MG: 40 TABLET, FILM COATED ORAL at 18:19

## 2025-02-11 ASSESSMENT — ENCOUNTER SYMPTOMS
LOSS OF CONSCIOUSNESS: 0
VOMITING: 0
DIZZINESS: 0
NAUSEA: 0
HEADACHES: 0
ABDOMINAL PAIN: 0
SHORTNESS OF BREATH: 0
PALPITATIONS: 0
DIARRHEA: 0
COUGH: 0
CHILLS: 0
FEVER: 0
BACK PAIN: 0

## 2025-02-11 ASSESSMENT — PAIN DESCRIPTION - PAIN TYPE
TYPE: ACUTE PAIN

## 2025-02-11 NOTE — CARE PLAN
The patient is Stable - Low risk of patient condition declining or worsening    Shift Goals  Clinical Goals: Pt will remain free from falls  by the end of shift.  Patient Goals: Pt would like to be able to go home by the end of the shift.  Family Goals: DANICA    Progress made toward(s) clinical / shift goals:  Pt has remained free from falls and has been communicative of his wants/needs. He was able to tolerate the removal of his IJ and is just awaiting his discharge tomorrow afternoon.     Patient is not progressing towards the following goals: N/A pt is progressing towards his goals.

## 2025-02-11 NOTE — CARE PLAN
The patient is Stable - Low risk of patient condition declining or worsening    Shift Goals  Clinical Goals: Pain management  Patient Goals: Go home  Family Goals: DANICA    Progress made toward(s) clinical / shift goals:  Patient medicated for pain , On O2 at 3LPM. ,Compliant with POC, Resting comfortably, bed in lowest position, call light within reach.  Possible go home today.    Patient is not progressing towards the following goals:

## 2025-02-11 NOTE — DISCHARGE PLANNING
DC Transport Scheduled    Transport Company Scheduled:  [facility]    Scheduled Date: 2/12/2025  Scheduled Time: 1300    Transport Type: [place]  Destination: [facility]   Destination address: [address] [place] [id number]    Notified care team of scheduled transport via [employer].     If there are any changes needed to the DC transportation scheduled, please contact [facility] at ext. [phone] between the hours of [phone]. If outside those hours, contact the ED Case Manager at ext. [phone].

## 2025-02-11 NOTE — CARE PLAN
The patient is Stable - Low risk of patient condition declining or worsening    Shift Goals  Clinical Goals: Pt will be able to rest with a pn goal of 3 out of 10 by the end of day.  Patient Goals: Pt would like to be able to go home.  Family Goals: DANICA    Progress made toward(s) clinical / shift goals:  Pt has maintained free from falls during this shift. He has not expressed any concern of pain. He did express that he was not aware of his fluid restriction and he was still hungry after his full meal. He was able to take his medications without issues and is hoping to be able to discharge soon.     Patient is not progressing towards the following goals:N/A pt is progressing towards his goals.

## 2025-02-11 NOTE — DISCHARGE PLANNING
@0852  Agency/Facility Name: [facility]  Spoke To: Misa  Outcome: Pt is on service with [facility].  Pt will need 3 portable E-tanks for discharge home.

## 2025-02-11 NOTE — FACE TO FACE
"Face to Face Note  -  Durable Medical Equipment    Nadine Deras M.D. - NPI: [id number]  I certify that this patient is under my care and that they had a durable medical equipment(DME)face to face encounter by myself that meets the physician DME face-to-face encounter requirements with this patient on:    Date of encounter:   Patient:                    MRN:                       YOB: 2025  Pb Peters  [id number]  1955     The encounter with the patient was in whole, or in part, for the following medical condition, which is the primary reason for durable medical equipment:  COPD    I certify that, based on my findings, the following durable medical equipment is medically necessary:    Oxygen   HOME O2 Saturation Measurements:(Values must be present for Home Oxygen orders)  Room air sat at rest: 83  Room air sat with amb: 79  With liters of O2: 3, O2 sat at rest with O2: 3  With Liters of O2: 3, O2 sat with amb with O2 : 92  Is the patient mobile?: Yes  If patient feels more short of breath, they can go up to 6 liters per minute and contact healthcare provider.    Supporting Symptoms: The patient requires supplemental oxygen, as the following interventions have been tried with limited or no improvement: \"Bronchodilators and/or steroid inhalers, \"Ambulation with oximetry, and \"Incentive spirometry.    My Clinical findings support the need for the above equipment due to:  Hypoxia  "

## 2025-02-11 NOTE — PROGRESS NOTES
[facility] -  PROGRESS NOTE               Author: Tarah Diana M.D. Date & Time: 2/11/2025  8:42 AM     HPI:  This is a 69-year-old male with past medical history of HFpEF, COPD on 6L NC at baseline, CKD stage IV with last Scr in the office of 2.3, infrarenal AAA, blindness who lives in a skilled nursing facility in [place].  Over the last several days has been more confused with generalized weakness.  He was sent to the ER at [facility] in [place] where he was found to be tachypneic with a normal heart rate and blood pressure.  Labs showed acute on chronic kidney disease with a creatinine of 6.57 and a potassium of 7.8.  He was given hyperkalemia protocol of IV calcium, albuterol and insulin.  He became mildly hypotensive therefore was given 2 L of NS and a potassium was repeated which was 7.6.  Calcium and albuterol were repeated.  Last VBG showed a pH of 7.1, pCO2 74, PaO2 80, bicarb 27, base excess -2.  A CT of the head was performed for his altered mental status which showed no acute abnormalities, CT of his abdomen showed no obstructing stone however atrophied kidneys and stable calcified infrarenal AAA, the chest portion of his CT abdomen did show a right lower lobe infiltrate and bilateral effusions and he has been given some for the possible pneumonia.  He was started on BiPAP to treat his hypercapnia.  He was also given IV Bicarbonate for his acidosis. Goals of care were discussed by the sending physician with his family who states that the patient wanted everything done including dialysis, CPR, intubation.He was flighted to [facility] for advanced care. Nephrology was consulted for THERESA/Hyperkalemia from [place] and notified by [facility] on arrival. He is currently on BiPAP in the ICU. He is currently hemodynamically stable but his BP is soft with systolics in the low 90s.  Scr is 6.19 here and Bicarb is 25. Rest of BMP is pending. Patient is not able to provide history.  This information is obtained from the medical record.      DAILY NEPHROLOGY SUMMARY:  1/29 - consult done, had HD  1/30 - K improved to 5.8, SBP 100s-130s, no UOP recorded, on BiPAP, answers some questions, slightly confused, no complaints  1/31 - tolerated HD, UF 2L, SBP 80s-120s this AM, K 5.3, UOP 1.695L, off BiPAP this AM, no new c/o, feels SOB and edema improved, no n/v/d, frustrated by NPO status (awaiting swallow eval)  2/01: Seen this Am sitting up In bed eating pancakes for breakfast. Doing well without complaints awaiting for HD this AM.   2/02: Had a fall overnight. He is sleeping in his bed this AM. Feels pretty tired from all the dialysis per patient but agreeable to anything that is recommended when asked about taking a break. He continues to make goo urine and had 1.3L UOP even with Hd and UF of 2.5L yesterday. Will hold today. Will hold off on standing orders and allow day team to reassess tomorrow for HD needs and monitor renal recovery.   2/03: NAEO, no complaints, sitting in chair at bedside, feels same as yesterday  2/04: Creatinine worsening today (3.52, up from 2.6). Repeat UA ordered however having poor UOP. Quite somnolent this morning; in setting of increasing creatinine with poor UOP and encephalopathy resuming dialysis due to possible uremic encephalopathy   2/05: NAEO, avoided intubation, tolerated BiPAP; Woke up a bit after HD yesterday; [facility]ICU  2/06: NAEO. Tolerated 3 hrs of UF yesterday, scheduled HD today due to oliguria. Creatinine approaching 2, though UOP documented as 250 cc last 24 hrs. Mentation much improved, patient feels well, no complaints. Transferred out of ICU to telemetry   2/07: NAEO,  cc (0.3 cc/kg/hr). Dialysis yesterday (2L net UF). Cr showing good response to dialysis sessions. Complaining of difficult to clear sputum but breathing otherwise feels about the same.   2/08: NAEO, no complaints, breathing better, feels about same  2/09: NAEO, no complaints, feels  better, happy about his labs  2/10: Feeling well today, enjoying his coffee this morning. Cr slightly increased though essentially stable 1.55 (up from 1.46 yesterday). UOP picking up (1200 cc out last 24 hrs). Possibly discharging today   2/11: Cr stable 1.59 (from 1.55).  cc last 24 hrs. Outpatient nephrology referral placed. Pending discharge. Feels well, tired from not getting much sleep     REVIEW OF SYSTEMS:    10 point ROS reviewed and is as per HPI/daily summary or otherwise negative    PMH/PSH/SH/FH: Reviewed and unchanged since admission note  CURRENT MEDICATIONS: Reviewed from admission to present day    VS:  BP (!) 141/69   Pulse 78   Temp 36.6 °C (97.9 °F) (Temporal)   Resp 17   Ht 1.829 m (6')   Wt 80.2 kg (176 lb 12.9 oz)   SpO2 94%   BMI 23.98 kg/m²     Physical Exam  Nursing note reviewed.   Constitutional:       Appearance: Normal appearance. He is ill-appearing (chronically ill appearing). He is not toxic-appearing.      Interventions: Face mask in place.   HENT:      Head: Normocephalic and atraumatic.   Eyes:      General: No scleral icterus.  Neck:      Comments: RIJ trialysis catheter in place  Cardiovascular:      Rate and Rhythm: Normal rate.   Pulmonary:      Effort: Pulmonary effort is normal. No respiratory distress.      Breath sounds: Normal breath sounds.   Abdominal:      General: There is no distension.   Musculoskeletal:         General: No deformity.      Right lower leg: No edema.      Left lower leg: No edema.   Skin:     Findings: No rash.   Neurological:      Mental Status: He is alert, oriented to person, place, and time and easily aroused.   Psychiatric:         Behavior: Behavior is cooperative.       FLUID BALANCE:  In: 990 [P.O.:990]  Out: 700     LABS:  Recent Labs     02/09/25  0719 02/10/25  0212 02/11/25  0200   SODIUM 137 137 139   POTASSIUM 4.4 4.5 4.2   CHLORIDE 103 101 101   CO2 27 28 29   GLUCOSE 91 111* 87   BUN 19 19 18   CREATININE 1.46* 1.55*  1.59*   CALCIUM 8.8 9.1 9.0     Recent Labs     02/09/25  0934   WBC 8.0   RBC 3.14*   HEMOGLOBIN 9.5*   HEMATOCRIT 30.1*   MCV 95.9   MCH 30.3   MCHC 31.6*   RDW 59.9*   PLATELETCT 133*   MPV 10.8     IMPRESSION:  # THERESA-DD  - Etiology is unclear, but suspect ATN  - HD x 3 through 2/1  - SCr significantly uptrending with trial of holding dialysis 2/2-2/3 so HD resumed 2/4-2/6; UOP oliguric at that time  #Oliguria   -Second to acute renal failure, resolved  # CKD Stage IV 2/2 HTN and Ischemic Nephropathy  # Acidosis, resolved  # Hyperkalemia, Resolved  # Hypercapnea, chronic   # HTN  # Acute on chronic resp failure  - initial concern for PNA, on abx, blood cx NGTD  -Resolved  # Encephalopathy, resolved  -Concern for uremic encephalopathy though likely multifactorial given concomitant CO2 retention requiring BiPAP  # Hypervolemia, Resolved  -Initially severely overloaded and resistant to diuretics , improved  -history of challenging to control volume  # CAD  # HFpEF  # AAA <4 cm  # HLD  # Hypothyroidism  # Hypoalbuminemia  # Anemia  - ferritin 313, %sat 28  - hgb goal 10-11, currently at goal  # CKD MBD  - Ca wnl phos wnl  - PTH 2/1/25= 187     PLAN:  - No need for iHD today, okay to remove RIJ trialysis catheter (hold pressure, use Tegaderm)  - Monitor I/Os while inpatient  - Daily labs while inpatient  - Low sodium diet  -Nephrology signing off, has been referred to  Nephrology outpatient for outpatient follow up

## 2025-02-11 NOTE — DISCHARGE INSTRUCTIONS
Discharge Instructions per Nadine Deras M.D.    Please follow-up with PCP as outpatient in one week and repeat thyroid function in 3 weeks. Discuss with your primary care doctor for dosage of levothyroxine  Follow up with nephrology as outpatient  Avoid any NSAIDs such as ibuprofen, Aleve, Motrin or Naproxen. Take tylenol over the counter if have mild headache or mild pain.       Return to ER in the event of new or worsening symptoms. Please note importance of compliance and the patient has agreed to proceed with all medical recommendations and follow up plan indicated above. All medications come with benefits and risks. Risks may include permanent injury or death and these risks can be minimized with close reassessment and monitoring. Please make it to your scheduled follow ups with PCP and nephrology

## 2025-02-11 NOTE — DISCHARGE PLANNING
Addendum:  2-11-25/1454  [facility] leadership approved transportation.    Soonest transportation is tomorrow at 1300 via GMT.  Pt is blind and unable to switch out oxygen tanks with [employer].  RN CCM spoke with pt's daughter, Devi and she is agreeable.        Case Management Discharge Planning    Admission Date: 1/29/2025  GMLOS: 4.4  ALOS: 13    6-Clicks ADL Score: 24  6-Clicks Mobility Score: 22      Anticipated Discharge Dispo: Discharge Disposition: Discharged to home/self care (01)  Discharge Address: [place] [address]  Discharge Contact Phone Number: [phone]    DME Needed: home oxygen    Action(s) Taken:   RN CCM spoke with patient's daughter Devi via telephone.  She nor other family/friends unable to drive pt home today.  They live 4 hours away near [facility] in [place] and off [facility].  Pt on service with [facility] [place] and has many portable oxygen tanks.   Pt lives in a travel trailer on her and her 's property.    Request to [facility] for [employer] transportation home.    Medically Clear: Yes    Next Steps:   Follow up with [facility] leadership for transportation home.  Request oxygen tanks from [facility] [place].    Barriers to Discharge: DME and Transportation

## 2025-02-11 NOTE — DISCHARGE SUMMARY
Discharge Summary    CHIEF COMPLAINT ON ADMISSION  No chief complaint on file.      Reason for Admission  Hyperkalemia with THERESA     Admission Date  1/29/2025    CODE STATUS  Full Code    HPI & HOSPITAL COURSE  This is a 68yo PMHx of blindness, COPD, chronic hypoxic respiratory failure on 6 LNC baseline, HTN, DM, CKD IV, hypothyroidism, CAD, currently living in a SNF in [place].  Presented to an [facility] hospital with hypercapnic resp failuure, pneumonia, THERESA on CKD with Cr 6.57 and hyperkalemia with Cr 7.8. He was given hyperkalemia protocol of IV calcium, albuterol and insulin.  A CT of the head was performed for his altered mental status which showed no acute abnormalities, CT of his abdomen showed no obstructing stone however atrophied kidneys and stable calcified infrarenal AAA. Chest portion of his CT abdomen did show a right lower lobe infiltrate and bilateral effusions. He was started on BiPAP.  He was also given IV Bicarbonate and was transferred here for nephrology evaluation.     Here nephrology was consulted. HD catheter was placed on 1/29. Patient underwent multiple sessions. Unclear etiology, suspecting ATN. His renal function has eventually improved after HD. HD has been held for a few days. Patient's renal function stable and has not required dialysis.  Dialysis catheter was removed.  Nephrology recommend outpatient follow-up.     Patient's oxygen needs has been improved.  He is currently on 3 L oxygen.  Home oxygen was ordered delivered before discharge.    Patient was eval by PT/OT who recommended no further therapy needs.     Therefore, he is discharged in good and stable condition to home with close outpatient follow-up.    The patient met 2-midnight criteria for an inpatient stay at the time of discharge.    Discharge Date  2/12/25    FOLLOW UP ITEMS POST DISCHARGE  PCP  Nephrology     DISCHARGE DIAGNOSES  Principal Problem:    Hyperkalemia (POA: Yes)  Active Problems:    Abdominal aortic aneurysm  (AAA) without rupture (HCC) (POA: Yes)    Hypertension, essential (POA: Yes)    Acute on chronic renal failure (HCC) (POA: Unknown)    Acute on chronic respiratory failure with hypoxia and hypercapnia (HCC) (POA: Unknown)    (HFpEF) heart failure with preserved ejection fraction (HCC) (POA: Unknown)    Toxic metabolic encephalopathy (POA: Unknown)    Shock (HCC) (POA: Unknown)    Bacterial pneumonia (POA: Yes)    Advance care planning (POA: Unknown)  Resolved Problems:    * No resolved hospital problems. *      FOLLOW UP  No future appointments.  CAROLINA Simspon  [address] [facility] [address]  [phone]    Follow up in 1 week(s)        MEDICATIONS ON DISCHARGE     Medication List        START taking these medications        Instructions   oxyCODONE immediate-release 5 MG Tabs  Commonly known as: Roxicodone   Take 1 Tablet by mouth every 8 hours as needed for Severe Pain for up to 3 days.  Dose: 5 mg            CONTINUE taking these medications        Instructions   allopurinol 100 MG Tabs  Commonly known as: Zyloprim   Take 100 mg by mouth every day.  Dose: 100 mg     amLODIPine 10 MG Tabs  Commonly known as: Norvasc   Take 10 mg by mouth every day. Hold if BP<100/50 or P,60  Dose: 10 mg     Anoro Ellipta 62.5-25 MCG/ACT Aepb inhaler  Generic drug: umeclidinium-vilanterol   Inhale 1 Puff every day.  Dose: 1 Puff     atorvastatin 40 MG Tabs  Commonly known as: Lipitor   Take 1 Tablet by mouth every day.  Dose: 1 Tablet     B-12 1000 MCG Tabs   Take 1,000 mcg by mouth every day.  Dose: 1,000 mcg     clopidogrel 75 MG Tabs  Commonly known as: Plavix   Take 75 mg by mouth every day.  Dose: 75 mg     famotidine 40 MG Tabs  Commonly known as: Pepcid   Take 40 mg by mouth every day.  Dose: 40 mg     ferrous sulfate 325 (65 Fe) MG tablet   Take 325 mg by mouth every day.  Dose: 325 mg     gabapentin 300 MG Caps  Commonly known as: Neurontin   Take 300 mg by mouth every day.  Dose: 300 mg      isosorbide mononitrate 120 MG CR tablet  Commonly known as: Imdur   Take 240 mg by mouth every morning.  Dose: 240 mg     Jardiance 10 MG Tabs tablet  Generic drug: Empagliflozin   Take 10 mg by mouth every day.  Dose: 10 mg     levothyroxine 150 MCG Tabs  Commonly known as: Synthroid   Take 150 mcg by mouth every morning on an empty stomach.  Dose: 150 mcg     Mag-Oxide 200 MG Tabs  Generic drug: Magnesium Oxide -Mg Supplement   Take 200 mg by mouth every day.  Dose: 200 mg     pramipexole 0.5 MG Tabs  Commonly known as: Mirapex   Take 0.5 mg by mouth every day.  Dose: 0.5 mg            STOP taking these medications      azithromycin 250 MG Tabs  Commonly known as: Zithromax              Allergies  Allergies   Allergen Reactions    Lisinopril Unspecified     Per pt damages kidneys       DIET  Orders Placed This Encounter   Procedures    Diet Order Diet: 2 Gram Sodium (Low potassium, low sodium, 1.5L fluid restriction. ASSIST w/TRAY SET UP, FEEDING A AS NEEDED); Nutrient modifications: (optional): Low Potassium; Miscellaneous modifications: (optional): Finger Foods ; Fluid modific...     Standing Status:   Standing     Number of Occurrences:   1     Order Specific Question:   Diet:     Answer:   2 Gram Sodium [7]     Comments:   Low potassium, low sodium, 1.5L fluid restriction. ASSIST w/TRAY SET UP, FEEDING A AS NEEDED     Order Specific Question:   Nutrient modifications: (optional)     Answer:   Low Potassium [11]     Order Specific Question:   Miscellaneous modifications: (optional)     Answer:   Finger Foods  [2]     Order Specific Question:   Fluid modifications: (optional)     Answer:   1500 ml Fluid Restriction [9]     Order Specific Question:   Tray Modifications (optional)     Answer:   SLP - Deliver to Nursing Station       ACTIVITY  As tolerated.  Weight bearing as tolerated    CONSULTATIONS  nephrology    PROCEDURES  S/p HD catheter placement 1/29    LABORATORY  Lab Results   Component Value Date     SODIUM 139 02/11/2025    POTASSIUM 4.2 02/11/2025    CHLORIDE 101 02/11/2025    CO2 29 02/11/2025    GLUCOSE 87 02/11/2025    BUN 18 02/11/2025    CREATININE 1.59 (H) 02/11/2025        Lab Results   Component Value Date    WBC 8.0 02/09/2025    HEMOGLOBIN 9.5 (L) 02/09/2025    HEMATOCRIT 30.1 (L) 02/09/2025    PLATELETCT 133 (L) 02/09/2025      DX-CHEST-LIMITED (1 VIEW)   Final Result         1.  Pulmonary edema and/or infiltrates, stable since prior study.   2.  Trace bilateral pleural effusions   3.  Atherosclerosis      DX-CHEST-LIMITED (1 VIEW)   Final Result      1.  Right-sided temporary dialysis catheter terminates with the tip projecting over the expected location of the mid to proximal SVC.   2.  Small to moderate right and small left pleural effusions with associated compressive atelectasis and/or consolidation.      CT-HEAD W/O   Final Result      1.  Diffuse atrophy and white matter changes.   2.  No acute intracranial hemorrhage or territorial infarct.   3.  Small chronic infarcts.               DX-CHEST-PORTABLE (1 VIEW)   Final Result      Right IJ catheter terminates in the region of the SVC. No pneumothorax.      Bibasilar opacities.          Total time of the discharge process 35 minutes.

## 2025-02-12 VITALS
DIASTOLIC BLOOD PRESSURE: 75 MMHG | SYSTOLIC BLOOD PRESSURE: 131 MMHG | HEART RATE: 75 BPM | TEMPERATURE: 97.4 F | RESPIRATION RATE: 17 BRPM | WEIGHT: 176.81 LBS | HEIGHT: 72 IN | BODY MASS INDEX: 23.95 KG/M2 | OXYGEN SATURATION: 92 %

## 2025-02-12 LAB
ALBUMIN SERPL BCP-MCNC: 2.9 G/DL (ref 3.2–4.9)
BUN SERPL-MCNC: 17 MG/DL (ref 8–22)
CALCIUM ALBUM COR SERPL-MCNC: 10.1 MG/DL (ref 8.5–10.5)
CALCIUM SERPL-MCNC: 9.2 MG/DL (ref 8.5–10.5)
CHLORIDE SERPL-SCNC: 101 MMOL/L (ref 96–112)
CO2 SERPL-SCNC: 31 MMOL/L (ref 20–33)
CREAT SERPL-MCNC: 1.7 MG/DL (ref 0.5–1.4)
ERYTHROCYTE [DISTWIDTH] IN BLOOD BY AUTOMATED COUNT: 61.1 FL (ref 35.9–50)
GFR SERPLBLD CREATININE-BSD FMLA CKD-EPI: 43 ML/MIN/1.73 M 2
GLUCOSE SERPL-MCNC: 109 MG/DL (ref 65–99)
HCT VFR BLD AUTO: 29 % (ref 42–52)
HGB BLD-MCNC: 9 G/DL (ref 14–18)
MCH RBC QN AUTO: 30.3 PG (ref 27–33)
MCHC RBC AUTO-ENTMCNC: 31 G/DL (ref 32.3–36.5)
MCV RBC AUTO: 97.6 FL (ref 81.4–97.8)
PHOSPHATE SERPL-MCNC: 3 MG/DL (ref 2.5–4.5)
PLATELET # BLD AUTO: 190 K/UL (ref 164–446)
PMV BLD AUTO: 10.7 FL (ref 9–12.9)
POTASSIUM SERPL-SCNC: 4.4 MMOL/L (ref 3.6–5.5)
RBC # BLD AUTO: 2.97 M/UL (ref 4.7–6.1)
SODIUM SERPL-SCNC: 138 MMOL/L (ref 135–145)
WBC # BLD AUTO: 7.2 K/UL (ref 4.8–10.8)

## 2025-02-12 PROCEDURE — 700102 HCHG RX REV CODE 250 W/ 637 OVERRIDE(OP): Performed by: INTERNAL MEDICINE

## 2025-02-12 PROCEDURE — 700102 HCHG RX REV CODE 250 W/ 637 OVERRIDE(OP)

## 2025-02-12 PROCEDURE — A9270 NON-COVERED ITEM OR SERVICE: HCPCS | Performed by: HOSPITALIST

## 2025-02-12 PROCEDURE — 700102 HCHG RX REV CODE 250 W/ 637 OVERRIDE(OP): Performed by: EMERGENCY MEDICINE

## 2025-02-12 PROCEDURE — 85027 COMPLETE CBC AUTOMATED: CPT

## 2025-02-12 PROCEDURE — 700111 HCHG RX REV CODE 636 W/ 250 OVERRIDE (IP): Performed by: INTERNAL MEDICINE

## 2025-02-12 PROCEDURE — 700102 HCHG RX REV CODE 250 W/ 637 OVERRIDE(OP): Performed by: STUDENT IN AN ORGANIZED HEALTH CARE EDUCATION/TRAINING PROGRAM

## 2025-02-12 PROCEDURE — 80069 RENAL FUNCTION PANEL: CPT

## 2025-02-12 PROCEDURE — 700102 HCHG RX REV CODE 250 W/ 637 OVERRIDE(OP): Performed by: HOSPITALIST

## 2025-02-12 PROCEDURE — A9270 NON-COVERED ITEM OR SERVICE: HCPCS | Performed by: INTERNAL MEDICINE

## 2025-02-12 PROCEDURE — 99239 HOSP IP/OBS DSCHRG MGMT >30: CPT | Performed by: STUDENT IN AN ORGANIZED HEALTH CARE EDUCATION/TRAINING PROGRAM

## 2025-02-12 PROCEDURE — A9270 NON-COVERED ITEM OR SERVICE: HCPCS

## 2025-02-12 PROCEDURE — A9270 NON-COVERED ITEM OR SERVICE: HCPCS | Performed by: EMERGENCY MEDICINE

## 2025-02-12 PROCEDURE — 36415 COLL VENOUS BLD VENIPUNCTURE: CPT

## 2025-02-12 PROCEDURE — A9270 NON-COVERED ITEM OR SERVICE: HCPCS | Performed by: STUDENT IN AN ORGANIZED HEALTH CARE EDUCATION/TRAINING PROGRAM

## 2025-02-12 RX ADMIN — ISOSORBIDE MONONITRATE 240 MG: 60 TABLET, EXTENDED RELEASE ORAL at 04:38

## 2025-02-12 RX ADMIN — LEVOTHYROXINE SODIUM 125 MCG: 0.12 TABLET ORAL at 04:39

## 2025-02-12 RX ADMIN — PRAMIPEXOLE DIHYDROCHLORIDE 0.5 MG: 0.5 TABLET ORAL at 04:39

## 2025-02-12 RX ADMIN — CLOPIDOGREL BISULFATE 75 MG: 75 TABLET, FILM COATED ORAL at 04:39

## 2025-02-12 RX ADMIN — GUAIFENESIN 600 MG: 600 TABLET, EXTENDED RELEASE ORAL at 04:39

## 2025-02-12 RX ADMIN — GABAPENTIN 200 MG: 100 CAPSULE ORAL at 04:39

## 2025-02-12 RX ADMIN — OXYCODONE 5 MG: 5 TABLET ORAL at 00:14

## 2025-02-12 RX ADMIN — OXYCODONE 5 MG: 5 TABLET ORAL at 04:39

## 2025-02-12 RX ADMIN — UMECLIDINIUM BROMIDE AND VILANTEROL TRIFENATATE 1 PUFF: 62.5; 25 POWDER RESPIRATORY (INHALATION) at 04:37

## 2025-02-12 RX ADMIN — OXYCODONE 5 MG: 5 TABLET ORAL at 12:10

## 2025-02-12 RX ADMIN — HEPARIN SODIUM 5000 UNITS: 5000 INJECTION, SOLUTION INTRAVENOUS; SUBCUTANEOUS at 06:41

## 2025-02-12 RX ADMIN — AMLODIPINE BESYLATE 10 MG: 10 TABLET ORAL at 04:39

## 2025-02-12 ASSESSMENT — PAIN DESCRIPTION - PAIN TYPE
TYPE: ACUTE PAIN

## 2025-02-12 NOTE — CARE PLAN
The patient is Stable - Low risk of patient condition declining or worsening    Shift Goals  Clinical Goals: Pt will rate pain at 5 or less out of 10 after interventions during this shfit.  Patient Goals: Pain control, snacks  Family Goals: Not at bedside    Pt pleasant and cooperative during this shift. Pain well-managed. Bed in locked, lowest position with alarm on.    Progress made toward(s) clinical / shift goals:    Problem: Knowledge Deficit - Standard  Goal: Patient and family/care givers will demonstrate understanding of plan of care, disease process/condition, diagnostic tests and medications  Outcome: Progressing     Problem: Skin Integrity  Goal: Skin integrity is maintained or improved  Outcome: Progressing     Problem: Fall Risk  Goal: Patient will remain free from falls  Outcome: Progressing     Problem: Respiratory  Goal: Patient will achieve/maintain optimum respiratory ventilation and gas exchange  Outcome: Progressing     Problem: Pain - Standard  Goal: Alleviation of pain or a reduction in pain to the patient’s comfort goal  Outcome: Progressing       Patient is not progressing towards the following goals:

## 2025-02-12 NOTE — CARE PLAN
The patient is Stable - Low risk of patient condition declining or worsening    Shift Goals  Clinical Goals: Pt will have pain management less than 5 out of 10 when discharging.  Patient Goals: Pt will be comfortable and leave today  Family Goals: Not at bedside    Progress made toward(s) clinical / shift goals:    Problem: Knowledge Deficit - Standard  Goal: Patient and family/care givers will demonstrate understanding of plan of care, disease process/condition, diagnostic tests and medications  Description: Target End Date:  1-3 days or as soon as patient condition allows    Document in Patient Education    1.  Patient and family/caregiver oriented to unit, equipment, visitation policy and means for communicating concern  2.  Complete/review Learning Assessment  3.  Assess knowledge level of disease process/condition, treatment plan, diagnostic tests and medications  4.  Explain disease process/condition, treatment plan, diagnostic tests and medications  Outcome: Progressing     Problem: Pain - Standard  Goal: Alleviation of pain or a reduction in pain to the patient’s comfort goal  Description: Target End Date:  Prior to discharge or change in level of care    Document on Vitals flowsheet    1.  Document pain using the appropriate pain scale per order or unit policy  2.  Educate and implement non-pharmacologic comfort measures (i.e. relaxation, distraction, massage, cold/heat therapy, etc.)  3.  Pain management medications as ordered  4.  Reassess pain after pain med administration per policy  5.  If opiods administered assess patient's response to pain medication is appropriate per POSS sedation scale  6.  Follow pain management plan developed in collaboration with patient and interdisciplinary team (including palliative care or pain specialists if applicable)  Outcome: Progressing   Pt given prn pain medication and it improved to 5 out 10. Pt given education on discharge and home medications.     Patient is not  progressing towards the following goals:

## 2025-02-12 NOTE — DISCHARGE PLANNING
Case Management Discharge Planning    Admission Date: 1/29/2025  GMLOS: 4.4  ALOS: 14    6-Clicks ADL Score: 24  6-Clicks Mobility Score: 22      Anticipated Discharge Dispo: Discharge Disposition: Discharged to home/self care (01)  Discharge Address: [facility]  [address]  Discharge Contact Phone Number: [phone]    DME Needed: home oxygen (pt on service with [facility] out of [place])    Action(s) Taken:   RN KRISTIN spoke with patient's daughter, Devi via telephone.  She requested T personnel to please call when they are at the [place] exit (172).  GMT here for pt.  I informed them of above.    Medically Clear: Yes    Next Steps: DC    Barriers to Discharge: None

## 2025-02-12 NOTE — PROGRESS NOTES
Hospital Medicine Daily Progress Note    Date of Service  2/11/2025    Chief Complaint  Pb Peters is a 69 y.o. male admitted 1/29/2025 with altered, hypoxic    Hospital Course  70yo PMHx COPD, chronic hypoxic respiratory failure on 6 LNC baseline, HTN, DM, CKD IV, hypothyroidism, CAD, currently living in a SNF in [place].  Presented to an [facility] hospital with hypercapnic resp failuure, pneumonia, THERESA on CKD with Cr 6.57 and hyperkalemia with Cr 7.8. He was given hyperkalemia protocol of IV calcium, albuterol and insulin.  A CT of the head was performed for his altered mental status which showed no acute abnormalities, CT of his abdomen showed no obstructing stone however atrophied kidneys and stable calcified infrarenal AAA, the chest portion of his CT abdomen did show a right lower lobe infiltrate and bilateral effusions and he has been given some for the possible pneumonia.  He was started on BiPAP to treat his hypercapnia.  He was also given IV Bicarbonate for his acidosis.   He was transferred here for emergent HD.     Nephrology was consulted. HD catheter placed on 1/29    Interval Problem Update  Seen patient at bedside  Currently on 3L O2 with 94% O2 saturation  Cr stable 1.59, discussed with nephrology, outpatient follow up   Dc HD cath  HHC and O2 ordered     I have discussed this patient's plan of care and discharge plan at IDT rounds today with Case Management, Nursing, Nursing leadership, and other members of the IDT team.    Consultants/Specialty  nephrology    Code Status  Full Code    Disposition  The patient is not medically cleared for discharge to home or a post-acute facility.      I have placed the appropriate orders for post-discharge needs.    Review of Systems  Review of Systems   Constitutional:  Negative for chills and fever.   Respiratory:  Negative for cough and shortness of breath.    Cardiovascular:  Negative for chest pain, palpitations and leg swelling.   Gastrointestinal:   Negative for abdominal pain, diarrhea, nausea and vomiting.   Genitourinary:  Negative for dysuria and urgency.   Musculoskeletal:  Negative for back pain.   Skin:  Negative for rash.   Neurological:  Negative for dizziness, loss of consciousness and headaches.        Physical Exam  Temp:  [36.3 °C (97.4 °F)-36.7 °C (98.1 °F)] 36.7 °C (98.1 °F)  Pulse:  [75-82] 80  Resp:  [17-18] 18  BP: (141-161)/(66-73) 146/66  SpO2:  [94 %-95 %] 94 %    Physical Exam  Constitutional:       General: He is not in acute distress.     Appearance: He is well-developed. He is not diaphoretic.   HENT:      Head: Normocephalic and atraumatic.   Eyes:      Conjunctiva/sclera: Conjunctivae normal.   Neck:      Vascular: No JVD.   Cardiovascular:      Rate and Rhythm: Normal rate.      Heart sounds: No murmur heard.     No gallop.   Pulmonary:      Effort: Pulmonary effort is normal. No respiratory distress.      Breath sounds: No stridor. No wheezing or rales.   Abdominal:      Palpations: Abdomen is soft.      Tenderness: There is no abdominal tenderness. There is no guarding or rebound.   Skin:     General: Skin is warm and dry.      Findings: No rash.   Neurological:      Mental Status: He is oriented to person, place, and time.   Psychiatric:         Thought Content: Thought content normal.         Fluids    Intake/Output Summary (Last 24 hours) at 2/11/2025 1622  Last data filed at 2/11/2025 1226  Gross per 24 hour   Intake 620 ml   Output 700 ml   Net -80 ml        Laboratory  Recent Labs     02/09/25  0934   WBC 8.0   RBC 3.14*   HEMOGLOBIN 9.5*   HEMATOCRIT 30.1*   MCV 95.9   MCH 30.3   MCHC 31.6*   RDW 59.9*   PLATELETCT 133*   MPV 10.8     Recent Labs     02/09/25  0719 02/10/25  0212 02/11/25  0200   SODIUM 137 137 139   POTASSIUM 4.4 4.5 4.2   CHLORIDE 103 101 101   CO2 27 28 29   GLUCOSE 91 111* 87   BUN 19 19 18   CREATININE 1.46* 1.55* 1.59*   CALCIUM 8.8 9.1 9.0                   Imaging  DX-CHEST-LIMITED (1 VIEW)   Final  Result         1.  Pulmonary edema and/or infiltrates, stable since prior study.   2.  Trace bilateral pleural effusions   3.  Atherosclerosis      DX-CHEST-LIMITED (1 VIEW)   Final Result      1.  Right-sided temporary dialysis catheter terminates with the tip projecting over the expected location of the mid to proximal SVC.   2.  Small to moderate right and small left pleural effusions with associated compressive atelectasis and/or consolidation.      CT-HEAD W/O   Final Result      1.  Diffuse atrophy and white matter changes.   2.  No acute intracranial hemorrhage or territorial infarct.   3.  Small chronic infarcts.               DX-CHEST-PORTABLE (1 VIEW)   Final Result      Right IJ catheter terminates in the region of the SVC. No pneumothorax.      Bibasilar opacities.           Assessment/Plan  * Hyperkalemia- (present on admission)  Assessment & Plan  Secondary to THERESA/CKD  Now on iHD  Monitor daily    Potassium 3.7  Holding off on dialysis today  Follow-up BMP in a.m.    Advance care planning  Assessment & Plan  68yo PMHx COPD, chronic hypoxic respiratory failure on 6 LNC baseline, HTN, DM, CKD IV, hypothyroidism, CAD, currently living in a SNF in [place], who is transferred here for significant THERESA on CKD and profound hyperkalemia requiring dialysis. Discussed goal of care and codes status with patient. Patient has medical capacity. He confirms FULL code. ACP 16 mins    Bacterial pneumonia- (present on admission)  Assessment & Plan  Completed course of amp/sulbactam  Cultures neg    Shock (HCC)  Assessment & Plan  Did have short episode of shock that did require Levophed however this has been off for over 24 hours and now blood pressure has increased  Restarting home antihypertensive    Toxic metabolic encephalopathy  Assessment & Plan  Patient continues to be encephalopathic  Likely uremic due to acute on chronic kidney disease  Also possibly secondary to pneumonia  Continue Unasyn  Hemodialysis per  nephrology  CT head showed nothing acute    1/31  Overnight associated with severe agitation  Patient was placed in restraints and Precedex drip was initiated    2/1  Agitation has improved yet patient remains confused  I am unsure what his baseline is  No need for Precedex drip or restraints    2/6  Mild confusion but overall much improved    Appears to be back to baseline    Acute on chronic respiratory failure with hypoxia and hypercapnia (HCC)  Assessment & Plan  Patient does have a history of COPD and does require 6 L of oxygen at baseline   CT chest showed changes consistent with right lower lobe pneumonia   Patient has been started on Unasyn and azithromycin   Has improved now back to his baseline    Patient is back to baseline 6 L of oxygen by nasal cannula      Acute on chronic renal failure (HCC)  Assessment & Plan  CKD IV baseline now with THERESA likely ATN and requiring HD  Associated with severe hyperkalemia   Nephrology consulted, temporary dialysis catheter was placed 1/29  Making good UOP  Repeat BMP daily  Uncertain prognosis for renal recovery  Renally dose medications as appropriate    Continue monitoring    Hypertension, essential- (present on admission)  Assessment & Plan  On home amlodipine  As needed hydralazine  Adjust as needed    Abdominal aortic aneurysm (AAA) without rupture (HCC)- (present on admission)  Assessment & Plan  Continue outpatient follow-up   No acute worsening   Does appear at baseline it is around 5.5 cm     Monitoring for any chest pain or abdominal pain         VTE prophylaxis: Heparin    I have performed a physical exam and reviewed and updated ROS and Plan today (2/11/2025). In review of yesterday's note (2/10/2025), there are no changes except as documented above.    My total time spent caring for the patient on the day of the encounter was 38 minutes.   This does not include time spent on separately billable procedures/tests.

## 2025-02-12 NOTE — PROGRESS NOTES
Pt given discharge instructions and educated on medication. Pt does not have any lines on. Pt is ready for discharge, waiting for transportation to leave.

## 2025-02-13 NOTE — Clinical Note
REFERRAL APPROVAL NOTICE         Sent on February 13, 2025                   Pb Chang [address] 1911  [facility] [address]                   Dear Mr. Peters,    After a careful review of the medical information and benefit coverage, [facility] has processed your referral. See below for additional details.    If applicable, you must be actively enrolled with your insurance for coverage of the authorized service. If you have any questions regarding your coverage, please contact your insurance directly.    REFERRAL INFORMATION   Referral #:  [id number]  Referred-To Provider    Referred-By Provider:  Nephrology    Tarah Diana M.D.   [facility]      [address] [facility] [address]  [phone] [address] [facility] [address]  [phone]    Referral Start Date:  02/10/2025  Referral End Date:   02/10/2026             SCHEDULING  If you do not already have an appointment, please call [phone] to make an appointment.     MORE INFORMATION  If you do not already have a [employer] account, sign up at: [email or web address]  You can access your medical information, make appointments, see lab results, billing information, and more.  If you have questions regarding this referral, please contact  the [facility] Referrals department at:             [phone]. Monday - Friday 8:00AM - 5:00PM.     Sincerely,    [facility]

## 2025-02-19 ENCOUNTER — TELEPHONE (OUTPATIENT)
Dept: HEALTH INFORMATION MANAGEMENT | Facility: OTHER | Age: 70
End: 2025-02-19
Payer: MEDICARE

## 2025-02-20 ENCOUNTER — HOSPITAL ENCOUNTER (INPATIENT)
Facility: MEDICAL CENTER | Age: 70
LOS: 8 days | End: 2025-02-28
Attending: STUDENT IN AN ORGANIZED HEALTH CARE EDUCATION/TRAINING PROGRAM | Admitting: HOSPITALIST
Payer: MEDICARE

## 2025-02-20 PROBLEM — J42 CHRONIC BRONCHITIS (HCC): Status: ACTIVE | Noted: 2025-02-20

## 2025-02-20 PROBLEM — N17.9 AKI (ACUTE KIDNEY INJURY) (HCC): Status: ACTIVE | Noted: 2025-02-20

## 2025-02-20 PROBLEM — Z87.891 FORMER SMOKER: Status: ACTIVE | Noted: 2025-02-20

## 2025-02-20 PROBLEM — E87.6 HYPOKALEMIA: Status: ACTIVE | Noted: 2025-02-20

## 2025-02-20 LAB
ALBUMIN SERPL BCP-MCNC: 3.5 G/DL (ref 3.2–4.9)
BASOPHILS # BLD AUTO: 1 % (ref 0–1.8)
BASOPHILS # BLD: 0.07 K/UL (ref 0–0.12)
BUN SERPL-MCNC: 30 MG/DL (ref 8–22)
CALCIUM ALBUM COR SERPL-MCNC: 10.3 MG/DL (ref 8.5–10.5)
CALCIUM SERPL-MCNC: 9.9 MG/DL (ref 8.5–10.5)
CHLORIDE SERPL-SCNC: 84 MMOL/L (ref 96–112)
CO2 SERPL-SCNC: 40 MMOL/L (ref 20–33)
CREAT SERPL-MCNC: 1.79 MG/DL (ref 0.5–1.4)
EOSINOPHIL # BLD AUTO: 0.28 K/UL (ref 0–0.51)
EOSINOPHIL NFR BLD: 4 % (ref 0–6.9)
ERYTHROCYTE [DISTWIDTH] IN BLOOD BY AUTOMATED COUNT: 54.9 FL (ref 35.9–50)
GFR SERPLBLD CREATININE-BSD FMLA CKD-EPI: 40 ML/MIN/1.73 M 2
GLUCOSE BLD STRIP.AUTO-MCNC: 89 MG/DL (ref 65–99)
GLUCOSE BLD STRIP.AUTO-MCNC: 89 MG/DL (ref 65–99)
GLUCOSE SERPL-MCNC: 93 MG/DL (ref 65–99)
HCT VFR BLD AUTO: 34 % (ref 42–52)
HGB BLD-MCNC: 11.1 G/DL (ref 14–18)
IMM GRANULOCYTES # BLD AUTO: 0.03 K/UL (ref 0–0.11)
IMM GRANULOCYTES NFR BLD AUTO: 0.4 % (ref 0–0.9)
LYMPHOCYTES # BLD AUTO: 1.3 K/UL (ref 1–4.8)
LYMPHOCYTES NFR BLD: 18.4 % (ref 22–41)
MAGNESIUM SERPL-MCNC: 2.1 MG/DL (ref 1.5–2.5)
MCH RBC QN AUTO: 30.2 PG (ref 27–33)
MCHC RBC AUTO-ENTMCNC: 32.6 G/DL (ref 32.3–36.5)
MCV RBC AUTO: 92.4 FL (ref 81.4–97.8)
MONOCYTES # BLD AUTO: 0.93 K/UL (ref 0–0.85)
MONOCYTES NFR BLD AUTO: 13.2 % (ref 0–13.4)
NEUTROPHILS # BLD AUTO: 4.44 K/UL (ref 1.82–7.42)
NEUTROPHILS NFR BLD: 63 % (ref 44–72)
NRBC # BLD AUTO: 0 K/UL
NRBC BLD-RTO: 0 /100 WBC (ref 0–0.2)
PHOSPHATE SERPL-MCNC: 2.5 MG/DL (ref 2.5–4.5)
PLATELET # BLD AUTO: 286 K/UL (ref 164–446)
PMV BLD AUTO: 9.8 FL (ref 9–12.9)
POTASSIUM SERPL-SCNC: 3.3 MMOL/L (ref 3.6–5.5)
PROCALCITONIN SERPL-MCNC: 0.17 NG/ML
RBC # BLD AUTO: 3.68 M/UL (ref 4.7–6.1)
SODIUM SERPL-SCNC: 135 MMOL/L (ref 135–145)
WBC # BLD AUTO: 7.1 K/UL (ref 4.8–10.8)

## 2025-02-20 PROCEDURE — 99497 ADVNCD CARE PLAN 30 MIN: CPT | Performed by: HOSPITALIST

## 2025-02-20 PROCEDURE — A9270 NON-COVERED ITEM OR SERVICE: HCPCS | Performed by: HOSPITALIST

## 2025-02-20 PROCEDURE — 700102 HCHG RX REV CODE 250 W/ 637 OVERRIDE(OP): Performed by: HOSPITALIST

## 2025-02-20 PROCEDURE — 97602 WOUND(S) CARE NON-SELECTIVE: CPT

## 2025-02-20 PROCEDURE — 85025 COMPLETE CBC W/AUTO DIFF WBC: CPT

## 2025-02-20 PROCEDURE — 83735 ASSAY OF MAGNESIUM: CPT

## 2025-02-20 PROCEDURE — 80069 RENAL FUNCTION PANEL: CPT

## 2025-02-20 PROCEDURE — 84145 PROCALCITONIN (PCT): CPT

## 2025-02-20 PROCEDURE — 36415 COLL VENOUS BLD VENIPUNCTURE: CPT

## 2025-02-20 PROCEDURE — 700111 HCHG RX REV CODE 636 W/ 250 OVERRIDE (IP): Performed by: HOSPITALIST

## 2025-02-20 PROCEDURE — 770020 HCHG ROOM/CARE - TELE (206)

## 2025-02-20 PROCEDURE — 99223 1ST HOSP IP/OBS HIGH 75: CPT | Mod: AI,25 | Performed by: HOSPITALIST

## 2025-02-20 PROCEDURE — 82962 GLUCOSE BLOOD TEST: CPT | Mod: 91

## 2025-02-20 RX ORDER — PRAMIPEXOLE DIHYDROCHLORIDE 0.5 MG/1
0.5 TABLET ORAL DAILY
Status: DISCONTINUED | OUTPATIENT
Start: 2025-02-21 | End: 2025-02-28 | Stop reason: HOSPADM

## 2025-02-20 RX ORDER — INSULIN LISPRO 100 [IU]/ML
1-6 INJECTION, SOLUTION INTRAVENOUS; SUBCUTANEOUS
Status: DISCONTINUED | OUTPATIENT
Start: 2025-02-20 | End: 2025-02-21

## 2025-02-20 RX ORDER — FAMOTIDINE 20 MG/1
20 TABLET, FILM COATED ORAL DAILY
Status: DISCONTINUED | OUTPATIENT
Start: 2025-02-21 | End: 2025-02-28 | Stop reason: HOSPADM

## 2025-02-20 RX ORDER — FERROUS SULFATE 325(65) MG
325 TABLET ORAL DAILY
Status: DISCONTINUED | OUTPATIENT
Start: 2025-02-21 | End: 2025-02-28 | Stop reason: HOSPADM

## 2025-02-20 RX ORDER — AMOXICILLIN 250 MG
2 CAPSULE ORAL EVERY EVENING
Status: DISCONTINUED | OUTPATIENT
Start: 2025-02-20 | End: 2025-02-28 | Stop reason: HOSPADM

## 2025-02-20 RX ORDER — ACETAMINOPHEN 325 MG/1
650 TABLET ORAL EVERY 6 HOURS PRN
Status: DISCONTINUED | OUTPATIENT
Start: 2025-02-20 | End: 2025-02-28 | Stop reason: HOSPADM

## 2025-02-20 RX ORDER — LEVOTHYROXINE SODIUM 150 UG/1
150 TABLET ORAL
Status: DISCONTINUED | OUTPATIENT
Start: 2025-02-21 | End: 2025-02-28 | Stop reason: HOSPADM

## 2025-02-20 RX ORDER — CLOPIDOGREL BISULFATE 75 MG/1
75 TABLET ORAL DAILY
Status: DISCONTINUED | OUTPATIENT
Start: 2025-02-21 | End: 2025-02-28 | Stop reason: HOSPADM

## 2025-02-20 RX ORDER — ISOSORBIDE MONONITRATE 60 MG/1
240 TABLET, EXTENDED RELEASE ORAL EVERY MORNING
Status: DISCONTINUED | OUTPATIENT
Start: 2025-02-21 | End: 2025-02-28 | Stop reason: HOSPADM

## 2025-02-20 RX ORDER — HEPARIN SODIUM 5000 [USP'U]/ML
5000 INJECTION, SOLUTION INTRAVENOUS; SUBCUTANEOUS EVERY 8 HOURS
Status: DISCONTINUED | OUTPATIENT
Start: 2025-02-20 | End: 2025-02-28 | Stop reason: HOSPADM

## 2025-02-20 RX ORDER — POLYETHYLENE GLYCOL 3350 17 G/17G
1 POWDER, FOR SOLUTION ORAL
Status: DISCONTINUED | OUTPATIENT
Start: 2025-02-20 | End: 2025-02-28 | Stop reason: HOSPADM

## 2025-02-20 RX ORDER — GABAPENTIN 300 MG/1
300 CAPSULE ORAL DAILY
Status: DISCONTINUED | OUTPATIENT
Start: 2025-02-21 | End: 2025-02-28 | Stop reason: HOSPADM

## 2025-02-20 RX ORDER — DEXTROSE MONOHYDRATE 25 G/50ML
25 INJECTION, SOLUTION INTRAVENOUS
Status: DISCONTINUED | OUTPATIENT
Start: 2025-02-20 | End: 2025-02-21

## 2025-02-20 RX ORDER — OXYCODONE HYDROCHLORIDE 5 MG/1
5 TABLET ORAL EVERY 4 HOURS PRN
Refills: 0 | Status: DISCONTINUED | OUTPATIENT
Start: 2025-02-20 | End: 2025-02-28 | Stop reason: HOSPADM

## 2025-02-20 RX ORDER — ATORVASTATIN CALCIUM 40 MG/1
40 TABLET, FILM COATED ORAL DAILY
Status: DISCONTINUED | OUTPATIENT
Start: 2025-02-21 | End: 2025-02-28 | Stop reason: HOSPADM

## 2025-02-20 RX ORDER — MORPHINE SULFATE 4 MG/ML
1 INJECTION INTRAVENOUS EVERY 4 HOURS PRN
Status: DISCONTINUED | OUTPATIENT
Start: 2025-02-20 | End: 2025-02-28 | Stop reason: HOSPADM

## 2025-02-20 RX ORDER — AMLODIPINE BESYLATE 10 MG/1
10 TABLET ORAL DAILY
Status: DISCONTINUED | OUTPATIENT
Start: 2025-02-21 | End: 2025-02-28 | Stop reason: HOSPADM

## 2025-02-20 RX ORDER — POTASSIUM CHLORIDE 1500 MG/1
20 TABLET, EXTENDED RELEASE ORAL ONCE
Status: COMPLETED | OUTPATIENT
Start: 2025-02-20 | End: 2025-02-20

## 2025-02-20 RX ADMIN — SENNOSIDES AND DOCUSATE SODIUM 2 TABLET: 50; 8.6 TABLET ORAL at 18:52

## 2025-02-20 RX ADMIN — POTASSIUM CHLORIDE 20 MEQ: 1500 TABLET, EXTENDED RELEASE ORAL at 16:20

## 2025-02-20 RX ADMIN — OXYCODONE HYDROCHLORIDE 5 MG: 5 TABLET ORAL at 16:20

## 2025-02-20 RX ADMIN — HEPARIN SODIUM 5000 UNITS: 5000 INJECTION, SOLUTION INTRAVENOUS; SUBCUTANEOUS at 22:00

## 2025-02-20 ASSESSMENT — SOCIAL DETERMINANTS OF HEALTH (SDOH)
WITHIN THE LAST YEAR, HAVE YOU BEEN KICKED, HIT, SLAPPED, OR OTHERWISE PHYSICALLY HURT BY YOUR PARTNER OR EX-PARTNER?: NO
WITHIN THE PAST 12 MONTHS, YOU WORRIED THAT YOUR FOOD WOULD RUN OUT BEFORE YOU GOT THE MONEY TO BUY MORE: NEVER TRUE
WITHIN THE LAST YEAR, HAVE TO BEEN RAPED OR FORCED TO HAVE ANY KIND OF SEXUAL ACTIVITY BY YOUR PARTNER OR EX-PARTNER?: NO
WITHIN THE PAST 12 MONTHS, THE FOOD YOU BOUGHT JUST DIDN'T LAST AND YOU DIDN'T HAVE MONEY TO GET MORE: NEVER TRUE
WITHIN THE LAST YEAR, HAVE YOU BEEN HUMILIATED OR EMOTIONALLY ABUSED IN OTHER WAYS BY YOUR PARTNER OR EX-PARTNER?: NO
IN THE PAST 12 MONTHS, HAS THE ELECTRIC, GAS, OIL, OR WATER COMPANY THREATENED TO SHUT OFF SERVICE IN YOUR HOME?: NO
WITHIN THE LAST YEAR, HAVE YOU BEEN AFRAID OF YOUR PARTNER OR EX-PARTNER?: NO

## 2025-02-20 ASSESSMENT — ENCOUNTER SYMPTOMS
HEADACHES: 0
BLURRED VISION: 0
BRUISES/BLEEDS EASILY: 0
VOMITING: 0
DOUBLE VISION: 0
HEMOPTYSIS: 0
NAUSEA: 0
FEVER: 0
HEARTBURN: 0
CLAUDICATION: 0
WHEEZING: 0
PALPITATIONS: 0
DIZZINESS: 0
PND: 0
DEPRESSION: 0
SHORTNESS OF BREATH: 1
WEAKNESS: 1
CHILLS: 0
MYALGIAS: 0
COUGH: 0
BACK PAIN: 0

## 2025-02-20 ASSESSMENT — COPD QUESTIONNAIRES
HAVE YOU SMOKED AT LEAST 100 CIGARETTES IN YOUR ENTIRE LIFE: YES
DURING THE PAST 4 WEEKS HOW MUCH DID YOU FEEL SHORT OF BREATH: SOME OF THE TIME
DO YOU EVER COUGH UP ANY MUCUS OR PHLEGM?: NO/ONLY WITH OCCASIONAL COLDS OR INFECTIONS
COPD SCREENING SCORE: 6

## 2025-02-20 ASSESSMENT — COGNITIVE AND FUNCTIONAL STATUS - GENERAL
WALKING IN HOSPITAL ROOM: A LOT
MOVING FROM LYING ON BACK TO SITTING ON SIDE OF FLAT BED: A LITTLE
DRESSING REGULAR UPPER BODY CLOTHING: A LITTLE
STANDING UP FROM CHAIR USING ARMS: A LOT
SUGGESTED CMS G CODE MODIFIER MOBILITY: CL
TOILETING: A LITTLE
CLIMB 3 TO 5 STEPS WITH RAILING: A LOT
TURNING FROM BACK TO SIDE WHILE IN FLAT BAD: A LITTLE
PERSONAL GROOMING: A LITTLE
EATING MEALS: A LITTLE
DRESSING REGULAR LOWER BODY CLOTHING: A LITTLE
DAILY ACTIVITIY SCORE: 18
HELP NEEDED FOR BATHING: A LITTLE
SUGGESTED CMS G CODE MODIFIER DAILY ACTIVITY: CK
MOBILITY SCORE: 14
MOVING TO AND FROM BED TO CHAIR: A LOT

## 2025-02-20 ASSESSMENT — PAIN DESCRIPTION - PAIN TYPE
TYPE: ACUTE PAIN
TYPE: ACUTE PAIN

## 2025-02-20 ASSESSMENT — LIFESTYLE VARIABLES
EVER HAD A DRINK FIRST THING IN THE MORNING TO STEADY YOUR NERVES TO GET RID OF A HANGOVER: NO
HAVE PEOPLE ANNOYED YOU BY CRITICIZING YOUR DRINKING: NO
TOTAL SCORE: 0
DOES PATIENT WANT TO STOP DRINKING: NO
TOTAL SCORE: 0
AVERAGE NUMBER OF DAYS PER WEEK YOU HAVE A DRINK CONTAINING ALCOHOL: 0
HAVE YOU EVER FELT YOU SHOULD CUT DOWN ON YOUR DRINKING: NO
ON A TYPICAL DAY WHEN YOU DRINK ALCOHOL HOW MANY DRINKS DO YOU HAVE: 0
ALCOHOL_USE: NO
HOW MANY TIMES IN THE PAST YEAR HAVE YOU HAD 5 OR MORE DRINKS IN A DAY: 0
CONSUMPTION TOTAL: NEGATIVE
TOTAL SCORE: 0
EVER FELT BAD OR GUILTY ABOUT YOUR DRINKING: NO

## 2025-02-20 ASSESSMENT — PATIENT HEALTH QUESTIONNAIRE - PHQ9
SUM OF ALL RESPONSES TO PHQ9 QUESTIONS 1 AND 2: 0
1. LITTLE INTEREST OR PLEASURE IN DOING THINGS: NOT AT ALL
2. FEELING DOWN, DEPRESSED, IRRITABLE, OR HOPELESS: NOT AT ALL

## 2025-02-20 ASSESSMENT — FIBROSIS 4 INDEX: FIB4 SCORE: 1.33

## 2025-02-20 NOTE — H&P
Hospital Medicine History & Physical Note    Date of Service  2/20/2025    Primary Care Physician  CAROLINA Simpson    Consultants  None        Code Status  Full Code    Chief Complaint  Shortness of breath acute kidney injury    History of Presenting Illness  Pb Peters is a 69 y.o. 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, hypertension, history of MI, CVA, aneurysm of infrarenal abdominal aorta, peptic ulcer disease, chronic kidney disease, secondary hyperparathyroidism, lower extremity edema, hyperglycemia he is being transferred from an outside facility [facility] as per report patient was having increasing shortness of breath, patient recently discharged from hospital patient required dialysis on previous admission but he was able to be weaned off dialysis he was sent home on oxygen, [place] and patient does not have power at home for his concentrator and he has been only using his portable tank.  Patient went to local ER where he was found to be hypoxic and during routine blood work he was found to have slightly increased creatinine from his previous baseline 2.17, due to concern for possible worsening kidney injury patient was transferred to higher level of care, patient denies any chest pain palpitations no focal weakness no numbness no tingling, patient states that he has been having dry cough, denies any hemoptysis no lymphadenopathy no wheezing or stridor no flulike symptoms,  Patient at this time is requiring 3 L of oxygen to keep his oxygen saturation at 97% initial vital signs heart rate 66 blood pressure 145/69 respiratory rate 16  Initial blood work showed sodium 142 potassium 3.32 chloride 92 BUN 34 creatinine 2.17 calcium 10, glucose 122 bilirubin 0.5 ALT 17 anion gap 11.9, WBC 8.53 hemoglobin  11.38 platelet count 306  UA did not show signs of UTI  Chest x-ray showed hyperinflation, with a small pleural effusion and mild right basilar consolidation cardiac silhouette is within normal limits patient has history of former smoker          He is on amlodipine 10 mg daily  Atorvastatin 40 mg daily  Plavix 75 mg daily  Famotidine 40 mg daily  Gabapentin 300 mg daily  Isosorbide mononitrate extended release 240 mg daily  Jardiance 10 mg daily  Levothyroxine 150 mg daily  Oxycodone 5 mg 3 times daily as needed  pramipexole 2.5 mg daily            Review of Systems  Review of Systems   Constitutional:  Negative for chills and fever.   Eyes:  Negative for blurred vision and double vision.   Respiratory:  Positive for shortness of breath. Negative for cough, hemoptysis and wheezing.    Cardiovascular:  Negative for chest pain, palpitations, claudication, leg swelling and PND.   Gastrointestinal:  Negative for heartburn, nausea and vomiting.   Genitourinary:  Negative for hematuria and urgency.   Musculoskeletal:  Negative for back pain and myalgias.   Skin:  Negative for rash.   Neurological:  Positive for weakness. Negative for dizziness and headaches.   Endo/Heme/Allergies:  Does not bruise/bleed easily.   Psychiatric/Behavioral:  Negative for depression.        Past Medical History  COPD  Hypertension  Chronic kidney disease    Surgical History  No recent surgeries    Family History    Family history reviewed with patient. There is no family history that is pertinent to the chief complaint.     Social History   reports that he has quit smoking. He has never used smokeless tobacco.    Allergies  Allergies   Allergen Reactions    Lisinopril Unspecified     Per pt damages kidneys       Medications  Prior to Admission Medications   Prescriptions Last Dose Informant Patient Reported? Taking?   ANORO ELLIPTA 62.5-25 MCG/ACT AEROSOL POWDER, BREATH ACTIVATED inhaler  MAR from Other Facility Yes No   Sig: Inhale 1 Puff  every day.   Cyanocobalamin (B-12) 1000 MCG Tab 2/20/2025 Morning MAR from Other Facility Yes Yes   Sig: Take 1,000 mcg by mouth every day.   JARDIANCE 10 MG Tab tablet 2/20/2025 Morning MAR from Other Facility Yes Yes   Sig: Take 10 mg by mouth every day.   Magnesium Oxide -Mg Supplement (MAG-OXIDE) 200 MG Tab 2/20/2025 Morning MAR from Other Facility Yes Yes   Sig: Take 200 mg by mouth every day.   allopurinol (ZYLOPRIM) 100 MG Tab 2/20/2025 Morning MAR from Other Facility Yes Yes   Sig: Take 100 mg by mouth every day.   amLODIPine (NORVASC) 10 MG Tab 2/20/2025 Morning MAR from Other Facility Yes Yes   Sig: Take 10 mg by mouth every day. Hold if BP<100/50 or P,60   atorvastatin (LIPITOR) 40 MG Tab 2/20/2025 Morning MAR from Other Facility Yes Yes   Sig: Take 1 Tablet by mouth every day.   clopidogrel (PLAVIX) 75 MG Tab 2/20/2025 Morning MAR from Other Facility Yes Yes   Sig: Take 75 mg by mouth every day.   famotidine (PEPCID) 40 MG Tab 2/20/2025 Morning MAR from Other Facility Yes Yes   Sig: Take 40 mg by mouth every day.   ferrous sulfate 325 (65 Fe) MG tablet 2/20/2025 Morning MAR from Other Facility Yes Yes   Sig: Take 325 mg by mouth every day.   gabapentin (NEURONTIN) 300 MG Cap 2/20/2025 Morning MAR from Other Facility Yes Yes   Sig: Take 300 mg by mouth every day.   isosorbide mononitrate (IMDUR) 120 MG CR tablet 2/20/2025 Morning MAR from Other Facility Yes Yes   Sig: Take 240 mg by mouth every morning.   levothyroxine (SYNTHROID) 150 MCG Tab 2/20/2025 Morning MAR from Other Facility Yes Yes   Sig: Take 150 mcg by mouth every morning on an empty stomach.   pramipexole (MIRAPEX) 0.5 MG Tab 2/20/2025 Morning MAR from Other Facility Yes Yes   Sig: Take 0.5 mg by mouth every day.      Facility-Administered Medications: None       Physical Exam  Temp:  [36.6 °C (97.9 °F)] 36.6 °C (97.9 °F)  Pulse:  [63] 63  Resp:  [20] 20  BP: (105)/(56) 105/56  SpO2:  [98 %] 98 %  Blood Pressure : 105/56   Temperature: 36.6  "°C (97.9 °F)   Pulse: 63   Respiration: 20   Pulse Oximetry: 98 %       Physical Exam  Vitals and nursing note reviewed.   Constitutional:       Appearance: He is ill-appearing (Chronic).   HENT:      Mouth/Throat:      Mouth: Mucous membranes are dry.   Eyes:      General:         Right eye: No discharge.         Left eye: No discharge.      Conjunctiva/sclera: Conjunctivae normal.   Cardiovascular:      Rate and Rhythm: Normal rate.      Pulses: Normal pulses.   Pulmonary:      Effort: Pulmonary effort is normal. No respiratory distress.      Breath sounds: Normal breath sounds.   Abdominal:      General: Bowel sounds are normal. There is no distension.      Tenderness: There is no abdominal tenderness.   Musculoskeletal:         General: Normal range of motion.      Cervical back: Normal range of motion and neck supple.   Skin:     General: Skin is warm.      Capillary Refill: Capillary refill takes less than 2 seconds.      Coloration: Skin is not jaundiced.   Neurological:      General: No focal deficit present.      Mental Status: He is alert and oriented to person, place, and time.      Cranial Nerves: No cranial nerve deficit.   Psychiatric:         Mood and Affect: Mood normal.         Behavior: Behavior normal.         Laboratory:          Recent Labs     02/20/25  1403   GLUCOSE 93         No results for input(s): \"NTPROBNP\" in the last 72 hours.      No results for input(s): \"TROPONINT\" in the last 72 hours.    Imaging:  No orders to display       Chest x-ray from outside facility reviewed    Assessment/Plan:  Justification for Admission Status  I anticipate this patient will require at least two midnights for appropriate medical management, necessitating inpatient admission because acute kidney injury and hypoxia patient will require oxygen arrangement before discharge monitoring kidney function        * THERESA (acute kidney injury) ([facility])- (present on admission)  Assessment & Plan  Continue " monitoring  Check protein creatinine ratio  Bladder scan  BMP in a.m.  US renal today  F/u bmp in am.    Hypokalemia  Assessment & Plan  Replacing potassium  Magnesium 2.1      K still low replacing.   F/u bmp in am.     Former smoker  Assessment & Plan  Needs to keep without smoking    Chronic bronchitis (HCC)- (present on admission)  Assessment & Plan  Patient former smoker  Continue RT per protocol  Patient is on 3 L of oxygen at baseline    Advance care planning- (present on admission)  Assessment & Plan  I discussed with patient regarding advance care planning, we discussed regarding CODE STATUS, we discussed regarding CPR intubation mechanical ventilation, discussed regarding side effects/complications, discussed regarding benefits of these 2 procedures, patient had time to ask questions, all questions were answered, spent 16 minutes.  After discussion patient would like his CODE STATUS to be full code.    (HFpEF) heart failure with preserved ejection fraction (HCC)- (present on admission)  Assessment & Plan  Continue monitoring  No signs of acute heart failure at this time  Last echocardiogram December 2024 showed normal ejection fraction    Iv hydration  Monitor for fluid overload    Acute on chronic renal failure (HCC)- (present on admission)  Assessment & Plan  Creatinine 1.79  Patient required dialysis on last admission, continue monitoring urine output, check bladder scan.    Cr 2.26  US renal ordered  No urgent indication for HD.       Hypertension, essential- (present on admission)  Assessment & Plan  Continue olanzapine  Imdur    Abdominal aortic aneurysm (AAA) without rupture (HCC)- (present on admission)  Assessment & Plan  Need follow-up as outpatient        VTE prophylaxis: Heparin              I have reviewed CBC  I had review BMP  I have reviewed patient's chest x-ray  I have reviewed ER physician note from referring hospital  I have placed order to admit patient to telemetry  I have discussed  with nurse staff  Review patient's old records  I have ordered blood work for in a.m. CBC BMP  I have ordered bladder scan  Ordered procalcitonin  Continue pain management including IV morphine, monitoring for side effects including but not limited to hypotension confusion

## 2025-02-20 NOTE — CARE PLAN
The patient is Watcher - Medium risk of patient condition declining or worsening         Progress made toward(s) clinical / shift goals:    Problem: Knowledge Deficit - Standard  Goal: Patient and family/care givers will demonstrate understanding of plan of care, disease process/condition, diagnostic tests and medications  Outcome: Progressing     Problem: Pain - Standard  Goal: Alleviation of pain or a reduction in pain to the patient’s comfort goal  Outcome: Progressing     Problem: Fall Risk  Goal: Patient will remain free from falls  Outcome: Progressing     Problem: Respiratory  Goal: Patient will achieve/maintain optimum respiratory ventilation and gas exchange  Outcome: Progressing       Patient is not progressing towards the following goals:

## 2025-02-20 NOTE — PROGRESS NOTES
4 Eyes Skin Assessment Completed by HERMANN Plasencia and HERMANN Berger.    Head WDL  Ears Redness and Non-Blanching  Nose WDL  Mouth WDL  Neck Redness and Scar  Breast/Chest Scar  Shoulder Blades Redness and Blanching  Spine Redness and Blanching  (R) Arm/Elbow/Hand Redness, Blanching, Bruising, and Scab  (L) Arm/Elbow/Hand Redness, Blanching, Bruising, and Scab  Abdomen Scar  Groin WDL  Scrotum/Coccyx/Buttocks Redness, Blanching, Discoloration, and Scar  (R) Leg Redness, Blanching, Scab, and Edema  (L) Leg Redness, Blanching, Scab, and Edema  (R) Heel/Foot/Toe Redness, Blanching, Boggy, and Edema  (L) Heel/Foot/Toe Redness, Blanching, Boggy, and Edema          Devices In Places Tele Box, Pulse Ox, and Nasal Cannula      Interventions In Place Gray Ear Foams, NC W/Ear Foams, Pillows, Dri-Esteban Pads, and Heels Loaded W/Pillows    Possible Skin Injury Yes    Pictures Uploaded Into Epic Yes  Wound Consult Placed Yes  RN Wound Prevention Protocol Ordered Yes

## 2025-02-21 ENCOUNTER — HOSPITAL ENCOUNTER (OUTPATIENT)
Dept: RADIOLOGY | Facility: MEDICAL CENTER | Age: 70
End: 2025-02-21

## 2025-02-21 ENCOUNTER — APPOINTMENT (OUTPATIENT)
Dept: RADIOLOGY | Facility: MEDICAL CENTER | Age: 70
End: 2025-02-21
Attending: STUDENT IN AN ORGANIZED HEALTH CARE EDUCATION/TRAINING PROGRAM
Payer: MEDICARE

## 2025-02-21 LAB
ALBUMIN SERPL BCP-MCNC: 3.5 G/DL (ref 3.2–4.9)
ALBUMIN/GLOB SERPL: 1 G/DL
ALP SERPL-CCNC: 171 U/L (ref 30–99)
ALT SERPL-CCNC: 9 U/L (ref 2–50)
ANION GAP SERPL CALC-SCNC: 11 MMOL/L (ref 7–16)
APPEARANCE UR: CLEAR
AST SERPL-CCNC: 25 U/L (ref 12–45)
BACTERIA #/AREA URNS HPF: NORMAL /HPF
BILIRUB SERPL-MCNC: 0.4 MG/DL (ref 0.1–1.5)
BILIRUB UR QL STRIP.AUTO: NEGATIVE
BUN SERPL-MCNC: 36 MG/DL (ref 8–22)
CALCIUM ALBUM COR SERPL-MCNC: 10 MG/DL (ref 8.5–10.5)
CALCIUM SERPL-MCNC: 9.6 MG/DL (ref 8.5–10.5)
CASTS URNS QL MICRO: NORMAL /LPF (ref 0–2)
CHLORIDE SERPL-SCNC: 85 MMOL/L (ref 96–112)
CO2 SERPL-SCNC: 40 MMOL/L (ref 20–33)
COLOR UR: YELLOW
CREAT SERPL-MCNC: 2.26 MG/DL (ref 0.5–1.4)
CREAT UR-MCNC: 103 MG/DL
CREAT UR-MCNC: 104 MG/DL
EPITHELIAL CELLS 1715: NORMAL /HPF (ref 0–5)
ERYTHROCYTE [DISTWIDTH] IN BLOOD BY AUTOMATED COUNT: 56.6 FL (ref 35.9–50)
EST. AVERAGE GLUCOSE BLD GHB EST-MCNC: 108 MG/DL
GFR SERPLBLD CREATININE-BSD FMLA CKD-EPI: 31 ML/MIN/1.73 M 2
GLOBULIN SER CALC-MCNC: 3.5 G/DL (ref 1.9–3.5)
GLUCOSE BLD STRIP.AUTO-MCNC: 93 MG/DL (ref 65–99)
GLUCOSE SERPL-MCNC: 100 MG/DL (ref 65–99)
GLUCOSE UR STRIP.AUTO-MCNC: 250 MG/DL
HBA1C MFR BLD: 5.4 % (ref 4–5.6)
HCT VFR BLD AUTO: 33.4 % (ref 42–52)
HGB BLD-MCNC: 10.8 G/DL (ref 14–18)
KETONES UR STRIP.AUTO-MCNC: NEGATIVE MG/DL
LEUKOCYTE ESTERASE UR QL STRIP.AUTO: NEGATIVE
MCH RBC QN AUTO: 30.3 PG (ref 27–33)
MCHC RBC AUTO-ENTMCNC: 32.3 G/DL (ref 32.3–36.5)
MCV RBC AUTO: 93.8 FL (ref 81.4–97.8)
MICRO URNS: ABNORMAL
MICROALBUMIN UR-MCNC: 2.6 MG/DL
MICROALBUMIN/CREAT UR: 25 MG/G (ref 0–30)
NITRITE UR QL STRIP.AUTO: NEGATIVE
PH UR STRIP.AUTO: 7 [PH] (ref 5–8)
PLATELET # BLD AUTO: 284 K/UL (ref 164–446)
PMV BLD AUTO: 10.2 FL (ref 9–12.9)
POTASSIUM SERPL-SCNC: 3.1 MMOL/L (ref 3.6–5.5)
PROT SERPL-MCNC: 7 G/DL (ref 6–8.2)
PROT UR QL STRIP: 30 MG/DL
PROT UR-MCNC: 27.6 MG/DL (ref 0–15)
PROT/CREAT UR: 268 MG/G (ref 15–68)
RBC # BLD AUTO: 3.56 M/UL (ref 4.7–6.1)
RBC # URNS HPF: NORMAL /HPF (ref 0–2)
RBC UR QL AUTO: NEGATIVE
SODIUM SERPL-SCNC: 136 MMOL/L (ref 135–145)
SP GR UR STRIP.AUTO: 1.01
UROBILINOGEN UR STRIP.AUTO-MCNC: 1 EU/DL
WBC # BLD AUTO: 7.9 K/UL (ref 4.8–10.8)
WBC #/AREA URNS HPF: NORMAL /HPF

## 2025-02-21 PROCEDURE — 99233 SBSQ HOSP IP/OBS HIGH 50: CPT | Performed by: HOSPITALIST

## 2025-02-21 PROCEDURE — 82570 ASSAY OF URINE CREATININE: CPT

## 2025-02-21 PROCEDURE — 700105 HCHG RX REV CODE 258: Performed by: HOSPITALIST

## 2025-02-21 PROCEDURE — 97162 PT EVAL MOD COMPLEX 30 MIN: CPT

## 2025-02-21 PROCEDURE — 770001 HCHG ROOM/CARE - MED/SURG/GYN PRIV*

## 2025-02-21 PROCEDURE — 80053 COMPREHEN METABOLIC PANEL: CPT

## 2025-02-21 PROCEDURE — A9270 NON-COVERED ITEM OR SERVICE: HCPCS

## 2025-02-21 PROCEDURE — 700111 HCHG RX REV CODE 636 W/ 250 OVERRIDE (IP): Performed by: HOSPITALIST

## 2025-02-21 PROCEDURE — 71045 X-RAY EXAM CHEST 1 VIEW: CPT

## 2025-02-21 PROCEDURE — 85027 COMPLETE CBC AUTOMATED: CPT

## 2025-02-21 PROCEDURE — 97166 OT EVAL MOD COMPLEX 45 MIN: CPT

## 2025-02-21 PROCEDURE — 84156 ASSAY OF PROTEIN URINE: CPT

## 2025-02-21 PROCEDURE — 94664 DEMO&/EVAL PT USE INHALER: CPT

## 2025-02-21 PROCEDURE — 82962 GLUCOSE BLOOD TEST: CPT

## 2025-02-21 PROCEDURE — 36415 COLL VENOUS BLD VENIPUNCTURE: CPT

## 2025-02-21 PROCEDURE — 92610 EVALUATE SWALLOWING FUNCTION: CPT

## 2025-02-21 PROCEDURE — 83036 HEMOGLOBIN GLYCOSYLATED A1C: CPT

## 2025-02-21 PROCEDURE — 700102 HCHG RX REV CODE 250 W/ 637 OVERRIDE(OP): Performed by: HOSPITALIST

## 2025-02-21 PROCEDURE — 700102 HCHG RX REV CODE 250 W/ 637 OVERRIDE(OP): Performed by: STUDENT IN AN ORGANIZED HEALTH CARE EDUCATION/TRAINING PROGRAM

## 2025-02-21 PROCEDURE — A9270 NON-COVERED ITEM OR SERVICE: HCPCS | Performed by: HOSPITALIST

## 2025-02-21 PROCEDURE — A9270 NON-COVERED ITEM OR SERVICE: HCPCS | Performed by: STUDENT IN AN ORGANIZED HEALTH CARE EDUCATION/TRAINING PROGRAM

## 2025-02-21 PROCEDURE — 81001 URINALYSIS AUTO W/SCOPE: CPT

## 2025-02-21 PROCEDURE — 700102 HCHG RX REV CODE 250 W/ 637 OVERRIDE(OP)

## 2025-02-21 PROCEDURE — 82043 UR ALBUMIN QUANTITATIVE: CPT

## 2025-02-21 RX ORDER — POTASSIUM CHLORIDE 1500 MG/1
40 TABLET, EXTENDED RELEASE ORAL ONCE
Status: COMPLETED | OUTPATIENT
Start: 2025-02-21 | End: 2025-02-21

## 2025-02-21 RX ORDER — SODIUM CHLORIDE 9 MG/ML
INJECTION, SOLUTION INTRAVENOUS CONTINUOUS
Status: DISCONTINUED | OUTPATIENT
Start: 2025-02-21 | End: 2025-02-21

## 2025-02-21 RX ORDER — POTASSIUM CHLORIDE 1500 MG/1
20 TABLET, EXTENDED RELEASE ORAL ONCE
Status: COMPLETED | OUTPATIENT
Start: 2025-02-21 | End: 2025-02-21

## 2025-02-21 RX ORDER — SODIUM CHLORIDE 9 MG/ML
INJECTION, SOLUTION INTRAVENOUS CONTINUOUS
Status: ACTIVE | OUTPATIENT
Start: 2025-02-21 | End: 2025-02-21

## 2025-02-21 RX ADMIN — HEPARIN SODIUM 5000 UNITS: 5000 INJECTION, SOLUTION INTRAVENOUS; SUBCUTANEOUS at 22:19

## 2025-02-21 RX ADMIN — FERROUS SULFATE TAB 325 MG (65 MG ELEMENTAL FE) 325 MG: 325 (65 FE) TAB at 05:14

## 2025-02-21 RX ADMIN — LEVOTHYROXINE SODIUM 150 MCG: 0.15 TABLET ORAL at 05:14

## 2025-02-21 RX ADMIN — SODIUM CHLORIDE: 9 INJECTION, SOLUTION INTRAVENOUS at 11:49

## 2025-02-21 RX ADMIN — ATORVASTATIN CALCIUM 40 MG: 40 TABLET, FILM COATED ORAL at 05:14

## 2025-02-21 RX ADMIN — POTASSIUM CHLORIDE 20 MEQ: 1500 TABLET, EXTENDED RELEASE ORAL at 05:14

## 2025-02-21 RX ADMIN — GABAPENTIN 300 MG: 300 CAPSULE ORAL at 05:14

## 2025-02-21 RX ADMIN — ISOSORBIDE MONONITRATE 240 MG: 60 TABLET, EXTENDED RELEASE ORAL at 05:14

## 2025-02-21 RX ADMIN — HEPARIN SODIUM 5000 UNITS: 5000 INJECTION, SOLUTION INTRAVENOUS; SUBCUTANEOUS at 15:32

## 2025-02-21 RX ADMIN — CLOPIDOGREL BISULFATE 75 MG: 75 TABLET ORAL at 05:13

## 2025-02-21 RX ADMIN — UMECLIDINIUM BROMIDE AND VILANTEROL TRIFENATATE 1 PUFF: 62.5; 25 POWDER RESPIRATORY (INHALATION) at 06:05

## 2025-02-21 RX ADMIN — PRAMIPEXOLE DIHYDROCHLORIDE 0.5 MG: 0.5 TABLET ORAL at 05:14

## 2025-02-21 RX ADMIN — OXYCODONE HYDROCHLORIDE 5 MG: 5 TABLET ORAL at 06:18

## 2025-02-21 RX ADMIN — SENNOSIDES AND DOCUSATE SODIUM 2 TABLET: 50; 8.6 TABLET ORAL at 16:43

## 2025-02-21 RX ADMIN — AMLODIPINE BESYLATE 10 MG: 10 TABLET ORAL at 05:13

## 2025-02-21 RX ADMIN — FAMOTIDINE 20 MG: 20 TABLET, FILM COATED ORAL at 05:13

## 2025-02-21 RX ADMIN — POTASSIUM CHLORIDE 40 MEQ: 1500 TABLET, EXTENDED RELEASE ORAL at 08:30

## 2025-02-21 RX ADMIN — HEPARIN SODIUM 5000 UNITS: 5000 INJECTION, SOLUTION INTRAVENOUS; SUBCUTANEOUS at 05:13

## 2025-02-21 ASSESSMENT — FIBROSIS 4 INDEX: FIB4 SCORE: 2.02

## 2025-02-21 ASSESSMENT — COGNITIVE AND FUNCTIONAL STATUS - GENERAL
MOVING TO AND FROM BED TO CHAIR: A LITTLE
WALKING IN HOSPITAL ROOM: A LITTLE
SUGGESTED CMS G CODE MODIFIER MOBILITY: CK
STANDING UP FROM CHAIR USING ARMS: A LITTLE
DAILY ACTIVITIY SCORE: 24
MOBILITY SCORE: 19
SUGGESTED CMS G CODE MODIFIER DAILY ACTIVITY: CH
CLIMB 3 TO 5 STEPS WITH RAILING: A LOT

## 2025-02-21 ASSESSMENT — ENCOUNTER SYMPTOMS
HEADACHES: 0
HEARTBURN: 0
HEMOPTYSIS: 0
FEVER: 0
DEPRESSION: 0
CHILLS: 0
VOMITING: 0
WHEEZING: 0
PND: 0
DOUBLE VISION: 0
CLAUDICATION: 0
BACK PAIN: 0
COUGH: 0
BLURRED VISION: 0
PALPITATIONS: 0
NAUSEA: 0
MYALGIAS: 0
DIZZINESS: 0
BRUISES/BLEEDS EASILY: 0

## 2025-02-21 ASSESSMENT — ACTIVITIES OF DAILY LIVING (ADL): TOILETING: INDEPENDENT

## 2025-02-21 ASSESSMENT — PAIN DESCRIPTION - PAIN TYPE
TYPE: ACUTE PAIN
TYPE: ACUTE PAIN

## 2025-02-21 NOTE — RESPIRATORY CARE
"  COPD EDUCATION by COPD CLINICAL EDUCATOR  2/21/2025 at 3:07 PM by Adela Saravia, RRT     Patient interviewed by COPD education team. Patient refused bedside COPD program at this time. He is here for non-respiratory issues. He has a nebulizer and rescue inhaler. [place] electricity.  He has many power generators in disrepair. [facility] services his Oxygen needs.  Call to Case management to discuss discharge.    His Action Plan was updated in the EMR to reflect current Respiratory Medication use.                   COPD Screen  COPD Risk Screening  Do you have a history of COPD?: No  COPD Population Screener  During the past 4 weeks, how much did you feel short of breath?: Some of the time  Do you ever cough up any mucus or phlegm?: No/only with occasional colds or infections  In the past 12 months, you do less than you used to because of your breathing problems: Agree  Have you smoked at least 100 cigarettes in your entire life?: Yes  How old are you?: 60+  COPD Screening Score: 6  COPD Coordinator Not Recommended: Yes    COPD Assessment  COPD Clinical Specialists ONLY  COPD Education Initiated: Yes--Short Intervention (met w/ pt;legally blind and [place]in [place]  with out electricity; has many generators in disrepair;has solar/propane;quit 11+yrs; call placed to Case Manag. to review)  Is this a COPD exacerbation patient?: No  DME Company: [employer]  DME Equipment Type: 3 lpm Oxygen  Physician Name: DIANA GONSALVES  Pulmonologist Name: none  Referrals Initiated: Yes  Smoking Cessation: No (quit>11y)  [facility] Health Care:  ([facility])  Mobile Urgent Care Services: N/A  Geriatric Specialty Group: N/A  $ Demo/Eval of SVN's, MDI's and Aerosols: Yes (took spacer to use with rescue inhaler)  Interdisciplinary Rounds: Attendance at Rounds (30 Min)    PFT Results  No results found for: \"PFT\"    Meds to Beds  [facility] provides bedside medication " "delivery for all eligible patients at discharge and you have been automatically enrolled in the Meds to Beds Program. Would you like to opt out of this program for any reason?: No - Stay Opted In     MY COPD ACTION PLAN     It is recommended that patients and physicians/healthcare providers complete this action plan together. This plan should be discussed at each physician visit and updated as needed.    The green, yellow and red zones show groups of symptoms of COPD. This list of symptoms is not comprehensive, and you may experience other symptoms. In the \"Actions\" column, your healthcare provider has recommended actions for you to take based on your symptoms.    Patient Name: Pb Peters   YOB: 1955   Last Updated on: 2/21/2025  3:07 PM   Green Zone:  I am doing well today Actions     Usual activitiy and exercise level   Take daily medications     Usual amounts of cough and phlegm/mucus   Use oxygen as prescribed     Sleep well at night   Continue regular exercise/diet plan     Appetite is good   At all times avoid cigarette smoke, inhaled irritants     Daily Medications (these medications are taken every day):                Yellow Zone:  I am having a bad day or a COPD flare Actions     More breathless than usual   Continue daily medications     I have less energy for my daily activities   Use quick relief inhaler as ordered     Increased or thicker phlegm/mucus   Use oxygen as prescribed     Using quick relief inhaler/nebulizer more often   Get plenty of rest     Swelling of ankles more than usual   Use pursed lip breathing     More coughing than usual   At all times avoid cigarette smoke, inhaled irritants     I feel like I have a \"chest cold\"     Poor sleep and my symptoms woke me up     My appetite is not good     My medicine is not helping      Call provider immediately if symptoms don’t improve     Continue daily medications, add rescue medications:   Albuterol/Ipratropium " (Combivent, Duoneb)  Albuterol 3mL via nebulizer  2 Puffs Every 4 hours PRN         Medications to be used during a flare up, (as Discussed with Provider):           Additional Information:  Keep your nebulizer kit clean daily   Use your holding chamber with your Albuterol inhaler    Red Zone:  I need urgent medical care Actions     Severe shortness of breath even at rest   Call 911 or seek medical care immediately     Not able to do any activity because of breathing      Fever or shaking chills      Feeling confused or very drowsy       Chest pains      Coughing up blood

## 2025-02-21 NOTE — PROGRESS NOTES
Bedside report received from off going RN Celine, assumed care of patient.     Fall Risk Score: HIGH RISK  Fall risk interventions in place: Place yellow fall risk ID band on patient, Provide patient/family education based on risk assessment, Educate patient/family to call staff for assistance when getting out of bed, Place fall precaution signage outside patient door, Place patient in room close to nursing station, Utilize bed/chair fall alarm, and Bed alarm connected correctly  Bed type: Regular (Mauricio Score less than 17 interventions in place)  Patient on cardiac monitor: Yes  IVF/IV medications: Not Applicable   Oxygen: How many liters 2L  Bedside sitter: Not Applicable   Isolation: Not applicable

## 2025-02-21 NOTE — PROGRESS NOTES
Hospital Medicine Daily Progress Note    Date of Service  2/21/2025    Chief Complaint  Pb Peters is a 69 y.o. male admitted 2/20/2025 with SOB, worse Cr    Hospital Course  Pb Peters is a 69 y.o. 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, hypertension, history of MI, CVA, aneurysm of infrarenal abdominal aorta, peptic ulcer disease, chronic kidney disease, secondary hyperparathyroidism, lower extremity edema, hyperglycemia he is being transferred from an outside facility [facility] as per report patient was having increasing shortness of breath, patient recently discharged from hospital patient required dialysis on previous admission but he was able to be weaned off dialysis he was sent home on oxygen, [place] been requiring oxygen and patient does not have power at home for his concentrator and he has been only using his portable tank.  Patient went to local ER where he was found to be hypoxic and during routine blood work he was found to have slightly increased creatinine from his previous baseline 2.17, due to concern for possible worsening kidney injury patient was transferred to higher level of care, patient denies any chest pain palpitations no focal weakness no numbness no tingling, patient states that he has been having dry cough, denies any hemoptysis no lymphadenopathy no wheezing or stridor no flulike symptoms,  Patient at this time is requiring 3 L of oxygen to keep his oxygen saturation at 97% initial vital signs heart rate 66 blood pressure 145/69 respiratory rate 16  Initial blood work showed sodium 142 potassium 3.32 chloride 92 BUN 34 creatinine 2.17 calcium 10, glucose 122 bilirubin 0.5 ALT 17 anion gap 11.9, WBC 8.53 hemoglobin 11.38 platelet count 306  UA did not show signs of UTI  Chest x-ray showed  hyperinflation, with a small pleural effusion and mild right basilar consolidation cardiac silhouette is within normal limits patient has history of former smoker    Interval Problem Update  2/21 patient is in bed, no new complains, Cr incrased today will get US renal,gently hydration, consider nephro consult, continue o2 per protocol, pt/ot/speech eval, discussed with bedside nurse, charge nurse, .     I have discussed this patient's plan of care and discharge plan at IDT rounds today with Case Management, Nursing, Nursing leadership, and other members of the IDT team.    Consultants/Specialty      Code Status  Full Code    Disposition  The patient is not medically cleared for discharge to home or a post-acute facility.      I have placed the appropriate orders for post-discharge needs.    Review of Systems  Review of Systems   Constitutional:  Negative for chills and fever.   Eyes:  Negative for blurred vision and double vision.   Respiratory:  Negative for cough, hemoptysis and wheezing.    Cardiovascular:  Negative for chest pain, palpitations, claudication, leg swelling and PND.   Gastrointestinal:  Negative for heartburn, nausea and vomiting.   Genitourinary:  Negative for hematuria and urgency.   Musculoskeletal:  Negative for back pain and myalgias.   Skin:  Negative for rash.   Neurological:  Negative for dizziness and headaches.   Endo/Heme/Allergies:  Does not bruise/bleed easily.   Psychiatric/Behavioral:  Negative for depression.         Physical Exam  Temp:  [36.5 °C (97.7 °F)-36.8 °C (98.2 °F)] 36.6 °C (97.9 °F)  Pulse:  [59-69] 66  Resp:  [16-20] 18  BP: (105-158)/(54-81) 116/71  SpO2:  [94 %-98 %] 96 %    Physical Exam  Vitals and nursing note reviewed.   Constitutional:       Appearance: He is ill-appearing.   HENT:      Mouth/Throat:      Mouth: Mucous membranes are dry.   Eyes:      General: No scleral icterus.        Right eye: No discharge.         Left eye: No discharge.    Cardiovascular:      Rate and Rhythm: Normal rate and regular rhythm.   Pulmonary:      Effort: Pulmonary effort is normal. No respiratory distress.   Abdominal:      General: Bowel sounds are normal. There is no distension.      Tenderness: There is no abdominal tenderness.   Musculoskeletal:         General: Normal range of motion.      Cervical back: Normal range of motion and neck supple.   Skin:     General: Skin is warm.      Capillary Refill: Capillary refill takes less than 2 seconds.      Coloration: Skin is not jaundiced.   Neurological:      General: No focal deficit present.      Mental Status: He is alert and oriented to person, place, and time.      Cranial Nerves: No cranial nerve deficit.   Psychiatric:         Mood and Affect: Mood normal.         Behavior: Behavior normal.         Fluids    Intake/Output Summary (Last 24 hours) at 2/21/2025 0924  Last data filed at 2/21/2025 0900  Gross per 24 hour   Intake 1640 ml   Output 775 ml   Net 865 ml        Laboratory  Recent Labs     02/20/25  1403 02/21/25  0045   WBC 7.1 7.9   RBC 3.68* 3.56*   HEMOGLOBIN 11.1* 10.8*   HEMATOCRIT 34.0* 33.4*   MCV 92.4 93.8   MCH 30.2 30.3   MCHC 32.6 32.3   RDW 54.9* 56.6*   PLATELETCT 286 284   MPV 9.8 10.2     Recent Labs     02/20/25  1403 02/21/25  0045   SODIUM 135 136   POTASSIUM 3.3* 3.1*   CHLORIDE 84* 85*   CO2 40* 40*   GLUCOSE 93 100*   BUN 30* 36*   CREATININE 1.79* 2.26*   CALCIUM 9.9 9.6                   Imaging  DX-CHEST-PORTABLE (1 VIEW)   Final Result      1.  Improving small right pleural effusion and adjacent right lower lung airspace disease.   2.  Improving left lower lung airspace disease.      US-RENAL    (Results Pending)        Assessment/Plan  * THERESA (acute kidney injury) (HCC)- (present on admission)  Assessment & Plan  Continue monitoring  Check protein creatinine ratio  Bladder scan  BMP in a.m.    Hypokalemia  Assessment & Plan  Replacing potassium  Magnesium 2.1    Former  smoker  Assessment & Plan  Needs to keep without smoking    Chronic bronchitis (HCC)- (present on admission)  Assessment & Plan  Patient former smoker  Continue RT per protocol  Patient is on 3 L of oxygen at baseline    Advance care planning- (present on admission)  Assessment & Plan  I discussed with patient regarding advance care planning, we discussed regarding CODE STATUS, we discussed regarding CPR intubation mechanical ventilation, discussed regarding side effects/complications, discussed regarding benefits of these 2 procedures, patient had time to ask questions, all questions were answered, spent 16 minutes.    (HFpEF) heart failure with preserved ejection fraction (HCC)- (present on admission)  Assessment & Plan  Continue monitoring  No signs of acute heart failure at this time  Last echocardiogram December 2024 showed normal ejection fraction    Acute on chronic renal failure (HCC)- (present on admission)  Assessment & Plan  Creatinine 1.79  Patient required dialysis on last admission, continue monitoring urine output, check bladder scan.    Hypertension, essential- (present on admission)  Assessment & Plan  Continue olanzapine  Imdur    Abdominal aortic aneurysm (AAA) without rupture (HCC)- (present on admission)  Assessment & Plan  Need follow-up as outpatient       VTE prophylaxis: heparin    I have performed a physical exam and reviewed and updated ROS and Plan today (2/21/2025). In review of yesterday's note (2/20/2025), there are no changes except as documented above.      Total time of 52 minutes spent prepping to see patient (e.g. reviewing  tests/imaging results, notes from consultants, bedside nurse, night shift ) obtaining and/or reviewing separately obtained history. Performing a medically appropriate examination and evaluation.  Counseling and educating the patient.  Ordering medications, tests, or procedures.  Referring and communicating with other health care professionals.  Documenting  clinical information in EPIC.  Independently interpreting results and communicating results to patient.  Care coordination.

## 2025-02-21 NOTE — CARE PLAN
The patient is Watcher - Medium risk of patient condition declining or worsening    Shift Goals  Clinical Goals: safety, monitor respiratory status, VS, monitor labs  Patient Goals: eat  Family Goals: DANICA    Progress made toward(s) clinical / shift goals:      Patient is not progressing towards the following goals:      Problem: Knowledge Deficit - Standard  Goal: Patient and family/care givers will demonstrate understanding of plan of care, disease process/condition, diagnostic tests and medications  Outcome: Progressing  Note: Discuss and review POC with patient. Re-educate as needed.       Problem: Respiratory  Goal: Patient will achieve/maintain optimum respiratory ventilation and gas exchange  Outcome: Progressing     Problem: Skin Integrity  Goal: Skin integrity is maintained or improved  Outcome: Progressing  Note: Assess skin and monitor for skin breakdown. Alleviate pressure to bony prominences and provide assistance with turning, repositioning, ROM and mobility as appropriate. Pt on low airloss with sacral mepilex.

## 2025-02-21 NOTE — WOUND TEAM
[facility]  Inpatient Services  Initial Wound and Skin Care Evaluation    Admission Date: 2/20/2025     Last order of IP CONSULT TO WOUND CARE was found on 2/20/2025 from Hospital Encounter on 2/19/2025     HPI, PMH, SH: Reviewed    No past surgical history on file.  Social History     Tobacco Use    Smoking status: Former     Types: Cigarettes    Smokeless tobacco: Never   Substance Use Topics    Alcohol use: Not on file     No chief complaint on file.    Diagnosis: THERESA (acute kidney injury) (HCC) [N17.9]    Unit where seen by Wound Team: T836/02     WOUND CONSULT RELATED TO:  BL ears    WOUND TEAM PLAN OF CARE - Frequency of Follow-up:   Nursing to follow dressing orders written for wound care. Contact wound team if area fails to progress, deteriorates or with any questions/concerns if something comes up before next scheduled follow up (See below as to whether wound is following and frequency of wound follow up)   Not following, consult as needed  - BL ears    WOUND HISTORY:   69 y.o. male who regularly wears oxygen.       WOUND ASSESSMENT/LDA  Wound 02/20/25 Pressure Injury Ear Bilateral POA DTIs (Active)   Date First Assessed/Time First Assessed: 02/20/25 1146   Present on Original Admission: Yes  Hand Hygiene Completed: Yes  Primary Wound Type: Pressure Injury  Location: Ear  Laterality: Bilateral  Wound Description (Comments): POA DTIs      Assessments 2/20/2025  4:00 PM   Wound Image      Site Assessment Purple;Red   Periwound Assessment Clean;Dry;Intact   Margins Defined edges;Attached edges   Closure Open to air   Drainage Amount None   Treatments Offloading   Dressing Status Open to Air   NEXT Weekly Photo (Inpatient Only) 02/27/25   Wound Team Following Not following   Pressure Injury Stage Deep Tissue        Vascular:    ELY:   No results found.    Lab Values:    Lab Results   Component Value Date/Time    WBC 7.1 02/20/2025 02:03 PM    RBC 3.68 (L) 02/20/2025 02:03 PM    HEMOGLOBIN 11.1  (L) 02/20/2025 02:03 PM    HEMATOCRIT 34.0 (L) 02/20/2025 02:03 PM         Culture Results show:  No results found for this or any previous visit (from the past 720 hours).    Pain Level/Medicated:  None, Tolerated without pain medication       INTERVENTIONS BY WOUND TEAM:  Chart and images reviewed. Discussed with bedside RN. All areas of concern (based on picture review, LDA review and discussion with bedside RN) have been thoroughly assessed. Documentation of areas based on significant findings. This RN in to assess patient. Performed standard wound care which includes appropriate positioning, dressing removal and non-selective debridement. Pictures and measurements obtained weekly if/when required.    Wound:  BL Ears  Primary Dressing:  Nasal cannula offloading foams    Advanced Wound Care Discharge Planning  Number of Clinicians necessary to complete wound care: 1  Is patient requiring IV pain medications for dressing changes:  No   Length of time for dressing change 5 min. (This does not include chart review, pre-medication time, set up, clean up or time spent charting.)    Interdisciplinary consultation: Patient, Bedside RN,  Anusha OLIVEIRA (Wound RN)    EVALUATION / RATIONALE FOR TREATMENT:     Date:  02/20/25  Wound Status:  Initial evaluation    BL Ears with POA DTIs, patient is oxygen dependent, recommend continuing usage of grey nasal cannula offloading foams.         Goals: Steady decrease in wound area and depth weekly.    NURSING PLAN OF CARE ORDERS:  RN Prevention Protocol    NUTRITION RECOMMENDATIONS   Wound Team Recommendations:  N/A    DIET ORDERS (From admission to next 24h)       Start     Ordered    02/20/25 1423  Diet Order Diet: Renal  ALL MEALS        Question:  Diet:  Answer:  Renal    02/20/25 0787                    PREVENTATIVE INTERVENTIONS:    Q shift Mauricio - performed per nursing policy  Q shift pressure point assessments - performed per nursing policy    Surface/Positioning  Standard/trauma  mattress - Currently in Place    Respiratory  Silicone O2 tubing - Currently in Place  Gray Foam Ear protectors - Currently in Place    Anticipated discharge plans:  TBD        Vac Discharge Needs:  Vac Discharge plan is purely a recommendation from wound team and not a requirement for discharge unless otherwise stated by physician.  Not Applicable Pt not on a wound vac

## 2025-02-21 NOTE — THERAPY
"Physical Therapy   Initial Evaluation     Patient Name: Pb Peters  Age:  69 y.o., Sex:  male  Medical Record #: [id number]  Today's Date: 2/21/2025     Precautions  Precautions: Fall Risk    Assessment  Patient is a 69 y.o. male who was admitted with shortness of breath. Pt sent home recently with home O2, but reportedly doesn't have electricity at home for the concentrator due to his generator being broken. PMH also significant for gout, blindness, HTN, MI, CVA, CKD, hypothyroidism.    Pt received in bed and agreeable to PT evaluation. Pt reports that since return home, he has been able to mobilize around his trailer without difficulty or falls. Pt was able to mobilize this session with supervision to SBA, reporting he continues to feel at his baseline level. Pt's primary barrier to return home is lack of electricity to work his oxygen concentrator rather than his physical function. No further PT needs at this time.    Plan    Physical Therapy Initial Treatment Plan   Duration: Evaluation only    DC Equipment Recommendations: None  Discharge Recommendations: Anticipate that the patient will have no further physical therapy needs after discharge from the hospital ([place]    Subjective    \"We tried to get the generator fixed, but they wanted too much money\"     Objective       02/21/25 1105   Precautions   Precautions Fall Risk   Vitals   O2 (LPM) 2   O2 Delivery Device Nasal Cannula   Pain 0 - 10 Group   Therapist Pain Assessment Post Activity Pain Same as Prior to Activity;Nurse Notified;0   Prior Living Situation   Housing / Facility Motor Home   Steps Into Home 2   Steps In Home 0   Equipment Owned Front-Wheel Walker   Lives with - Patient's Self Care Capacity Alone and Able to Care For Self   Comments Pt lives in a trailer on the same property that his daughter/NAVEED live on in their own trailer. Daughter guides pt for all community mobility due to pt blindness.   Prior " Level of Functional Mobility   Bed Mobility Independent   Transfer Status Independent   Ambulation Independent   Stairs Independent   Comments Reports holding on to the furniture/walls in the home for support if needed.   History of Falls   History of Falls Yes   Date of Last Fall   (hx falls during prior SNF stay)   Cognition    Level of Consciousness Alert   Comments Pleasant and cooperative   Passive ROM Lower Body   Passive ROM Lower Body WDL   Active ROM Lower Body    Active ROM Lower Body  WDL   Strength Lower Body   Comments BLE grossly functional, no buckling in standing   Sensation Lower Body   Lower Extremity Sensation   WDL   Lower Body Muscle Tone   Lower Body Muscle Tone  WDL   Coordination Lower Body    Coordination Lower Body  WDL   Balance Assessment   Sitting Balance (Static) Fair +   Sitting Balance (Dynamic) Fair +   Standing Balance (Static) Fair   Standing Balance (Dynamic) Fair   Weight Shift Sitting Fair   Weight Shift Standing Fair   Comments no AD   Bed Mobility    Supine to Sit Standby Assist   Scooting Standby Assist   Rolling Standby Assist   Comments HOB elevated   Gait Analysis   Comments Pt declined, reporting no concerns   Functional Mobility   Sit to Stand Standby Assist   Bed, Chair, Wheelchair Transfer Standby Assist   Transfer Method Stand Step   Mobility bed>recliner   Comments Pt reaches for the chair to ensure he knows where it is before turning to sit down. Limited by vision.   6 Clicks Assessment - How much HELP from from another person do you currently need... (If the patient hasn't done an activity recently, how much help from another person do you think he/she would need if he/she tried?)   Turning from your back to your side while in a flat bed without using bedrails? 4   Moving from lying on your back to sitting on the side of a flat bed without using bedrails? 4   Moving to and from a bed to a chair (including a wheelchair)? 3   Standing up from a chair using your arms  (e.g., wheelchair, or bedside chair)? 3   Walking in hospital room? 3   Climbing 3-5 steps with a railing? 2   6 clicks Mobility Score 19   Education Group   Education Provided Role of Physical Therapist   Role of Physical Therapist Patient Response Patient;Acceptance;Explanation;Verbal Demonstration   Physical Therapy Initial Treatment Plan    Duration Evaluation only   Anticipated Discharge Equipment and Recommendations   DC Equipment Recommendations None   Discharge Recommendations Anticipate that the patient will have no further physical therapy needs after discharge from the hospital  ([place])   Interdisciplinary Plan of Care Collaboration   IDT Collaboration with  Nursing   Patient Position at End of Therapy Seated;Chair Alarm On;Call Light within Reach;Tray Table within Reach;Phone within Reach   Collaboration Comments RN updated   Session Information   Date / Session Number  2/21-eval only

## 2025-02-21 NOTE — PROGRESS NOTES
Monitor Summary  Rhythm: SR  Rate: 60-67  Ectopy: (R) PAC (R) PVC, (F) PVC,   .14 / .10 / .43

## 2025-02-21 NOTE — HOSPITAL COURSE
Pb Peters is a 69-year-old male with PMHx hypothyroidism, gout, anxiety, depression, HTN, CVA, AAA, GERD, CKD, secondary para hypothyroidism.  Admitted from [place] for worsening shortness of breath.    Per history-patient required dialysis in the recent past.  He was weaned off dialysis.  He developed worsening shortness of breath.  He presented to the ER for further evaluation.  Creatinine 2.17 which is slightly increased from previous.  Transferred to [facility] for management of worsening kidney function.    Creatinine 2.17 on admission.  Patient continues to have good urine output.  Creatinine improved to 1.7 and 1.8 at time of discharge.  Electrolytes remained stable.  No indications for urgent dialysis at this time.  Patient will need to follow-up with outpatient nephrology moving forward.    CXR showing hypoinflation.  Patient continued to require 2 to 3 L/min supplemental O2.  He needed a new oxygen order completed.  This has been ordered and delivered prior to discharge.

## 2025-02-21 NOTE — ASSESSMENT & PLAN NOTE
I discussed with patient regarding advance care planning, we discussed regarding CODE STATUS, we discussed regarding CPR intubation mechanical ventilation, discussed regarding side effects/complications, discussed regarding benefits of these 2 procedures, patient had time to ask questions, all questions were answered, spent 16 minutes.  After discussion patient would like his CODE STATUS to be full code.

## 2025-02-21 NOTE — ASSESSMENT & PLAN NOTE
Patient former smoker  Continue RT per protocol  Patient is on 3 L of oxygen at baseline  - Home O2 ordered today; difficult discharge planning due to solar powered trailer. Does not provide enough electricity to power concentrator

## 2025-02-21 NOTE — DISCHARGE PLANNING
Community Health Worker Intake    Identified barriers to electricity in the home.  Contact information provided to Pb Peters   Has PCP appointment scheduled for 3/7/25  Inpatient assessment completed    CHW Natalee introduced community care management to the patient.     Pt lives in [place], [place] 60 feet from his daughter Roseline.   Pt lives in a trailer and his daughter in an RV.   Pt states that they live half a mile [place] the main road; main road is the only place that has power lines and is supplied with power, per patient.   He states that they use generators for power, however, his 4 generators are broken.   CHW called [place] [employer] to see if they have any resources available. Elda is with the [employer] [place] [phone] will call back.   CHW also called and spoke to the [facility] and asked about energy assistance. The manager is out of the office until later today. Cindy Coppola phone # [phone].   CHW called Cindy and left a voicemail.     Pt utilizes home oxygen. 3L O2 through [employer].     Pt is blind in his left eye. States that he doesn't answer the phone so the best way to contact him is by calling his daughter.     Pt states that his daughter provides transportation.     Pt states that he followed up with a primary care physician in [place] after his last hospital admission. FERNANDO Simpson.  Pt has a hospital follow up on Friday 3/7/25 at noon.     Pt is not receiving SNAP benefits but states that they utilize local food pantries and do not have any barriers obtaining food.     Pt declines barriers with obtaining medication.     Plan: CHW will follow up with the patient's daughter after DC.

## 2025-02-21 NOTE — THERAPY
Occupational Therapy   Initial Evaluation     Patient Name: Pb Peters  Age:  69 y.o., Sex:  male  Medical Record #: [id number]  Today's Date: 2/21/2025     Precautions: Fall Risk  Comments: blindness    Assessment    Patient is 69 y.o. male admitted with SOB. [place] and does not have power at home for his concentrator, found to be hypoxic in ED. Pt provided ideas for community resources such as [employer] to reach out to in regards to increasing accessible use of his oxygen concentrator medical device. Pt reports his daughter is a big support but she is unable to afford the repair of their generator. While DC home without power and therefore without supplemental O2 is not safe medically, pt does not require further acute OT intervention at this time.     Plan    Occupational Therapy Initial Treatment Plan   Duration: Evaluation only    DC Equipment Recommendations: None  Discharge Recommendations: Anticipate that the patient will have no further occupational therapy needs after discharge from the hospital      Objective     02/21/25 1115   Prior Living Situation   Prior Services None   Housing / Facility Motor Home  (without power)   Steps Into Home 2   Steps In Home 0   Equipment Owned Front-Wheel Walker   Lives with - Patient's Self Care Capacity Alone and Able to Care For Self   Comments lives in a motor home without power, daughter is biggest support, lives next door in another trailer without power. Their generator is not functional, needs an expenisve repair they cannot afford   Prior Level of ADL Function   Self Feeding Independent   Grooming / Hygiene Independent   Bathing Independent   Dressing Independent   Toileting Independent   Comments pt is blind but manages his ADLs independently   Prior Level of IADL Function   Medication Management Independent   Laundry Independent   Kitchen Mobility Independent   Finances Independent   Home Management Independent   Shopping Independent   Prior  Level Of Mobility   (holds his daughters arm while ambulating in the community)   Driving / Transportation Walks;Relatives / Others Provide Transportation   Precautions   Precautions Fall Risk   Vitals   O2 (LPM) 2   O2 Delivery Device Nasal Cannula   Vitals Comments he cannot fun his supplemental O2 at home 2/2 no power   Cognition    Cognition / Consciousness WDL   Level of Consciousness Alert   Comments pleasant   Active ROM Upper Body   Active ROM Upper Body  WDL   Strength Upper Body   Upper Body Strength  WDL   Sensation Upper Body   Upper Extremity Sensation  WDL   Upper Body Muscle Tone   Upper Body Muscle Tone  WDL   Coordination Upper Body   Coordination WDL   Balance Assessment   Sitting Balance (Static) Fair +   Sitting Balance (Dynamic) Fair +   Standing Balance (Static) Fair   Standing Balance (Dynamic) Fair   Weight Shift Sitting Fair   Weight Shift Standing Fair   Comments HHA 2/2 blindness   Bed Mobility    Sit to Supine Supervised   Scooting Supervised   Rolling Supervised   ADL Assessment   Eating Independent   Grooming Independent;Seated   Upper Body Dressing Supervision   Lower Body Dressing Supervision   How much help from another person does the patient currently need...   Putting on and taking off regular lower body clothing? 4   Bathing (including washing, rinsing, and drying)? 4   Toileting, which includes using a toilet, bedpan, or urinal? 4   Putting on and taking off regular upper body clothing? 4   Taking care of personal grooming such as brushing teeth? 4   Eating meals? 4   6 Clicks Daily Activity Score 24   Functional Mobility   Sit to Stand Supervised   Bed, Chair, Wheelchair Transfer Supervised   Transfer Method Stand Step   Mobility recliner>EOB>recliner   Activity Tolerance   Comments no deficits, pt's only issue is unable to run his O2 at home   Education Group   Education Provided Role of Occupational Therapist;Activities of Daily Living;Other (comments)  (community resources)    Role of Occupational Therapist Patient Response Patient;Acceptance;Explanation;Verbal Demonstration   ADL Patient Response Patient;Acceptance;Explanation;Verbal Demonstration   Occupational Therapy Initial Treatment Plan    Duration Evaluation only   Problem List   Problem List None   Anticipated Discharge Equipment and Recommendations   DC Equipment Recommendations None   Discharge Recommendations Anticipate that the patient will have no further occupational therapy needs after discharge from the hospital   Interdisciplinary Plan of Care Collaboration   IDT Collaboration with  Nursing   Patient Position at End of Therapy Seated;Chair Alarm On;Call Light within Reach;Tray Table within Reach;Phone within Reach   Collaboration Comments report given   Session Information   Date / Session Number  2/21, 1 (dc needs)

## 2025-02-21 NOTE — THERAPY
Speech Language Pathology   Clinical Swallow Evaluation     Patient Name: Pb Peters  AGE:  69 y.o., SEX:  male  Medical Record #: [id number]  Date of Service: 2/21/2025      History of Present Illness  69 y.o. male admitted 2/20/2025 with SOB, worse Cr.    PMH: CAD s/p PCI in 2014, HFpEF, COPD on 6 L at home, stage IV CKD, and infrarenal AAA, blindness    2/6/25 Dysphagia Tx rec RG7/TN0 - finger foods  1/30/25 CSE rec NPO pend RA    CMHx THERESA, Hypokalemia, Chronic bronchitis, Acute on chronic RF, HTN, AAA w/out rupture    CXR 2/21/25  1.  Improving small right pleural effusion and adjacent right lower lung airspace disease.  2.  Improving left lower lung airspace disease.      General Information:  Vitals  O2 (LPM): 2  O2 Delivery Device: Nasal Cannula  Level of Consciousness: Alert, Awake  Orientation: Oriented x 4  Follows Directives: Yes      Prior Living Situation & Level of Function:  Lives with - Patient's Self Care Capacity: Adult Children  Communication: WFL  Swallowing: WFL       Oral Mechanism Evaluation:  Dentition: Some missing dentition   Facial Symmetry: Equal  Facial Sensation: Equal     Labial Observations: WFL   Lingual Observations: Midline           Laryngeal Function:  Secretion Management: Adequate  Voice Quality: WFL  Cough: Perceptually WNL         Subjective  Patient cleared by RN for evaluation. Patient received awake, sitting UIB, agreeable to participate in evaluation.      Assessment  Current Method of Nutrition: Oral diet (RG7/TN0)  Positioning: Ortiz's (60-90 degrees)  Bolus Administration: SLP, Patient  O2 (LPM): 2 O2 Delivery Device: Nasal Cannula  Factor(s) Affecting Performance: None  Tracheostomy : No          Swallowing Trials:  Swallowing Trials  Thin Liquid (TN0): WFL  Pureed (PU4): WFL  Regular (RG7): WFL      Comments: Patient seen with regular/thins breakfast tray, needing feeding A r/t blindness. Adequate bolus acceptance/containment. Adequate bite and functional  "mastication of solids. Swallow initiation appeared timely. No cough/throat clear appreciated. Vocal quality remained clear. No overt s/sx of aspiration appreciated.       Clinical Impressions  Patient presents with a functional swallow, diet modification is not indicated. Service will follow likely x1 to ensure diet tolerance and monitor for changes.      Recommendations  Diet Consistency: Regular solids, thin liquids - finger foods  Instrumentation: None indicated at this time  Medication: As tolerated  Supervision: Assist with meal tray set up (1:1 feeding A d/t blindness)  Positioning: Fully upright and midline during oral intake, Meals sitting upright in a chair, as tolerated  Risk Management : Slow rate of intake, Physical mobility, as tolerated  Oral Care: BID         SLP Treatment Plan  Treatment Plan: Dysphagia Treatment, Patient/Family/Caregiver Training  SLP Frequency: 2x Per Week  Estimated Duration: Until Therapy Goals Met      Anticipated Discharge Needs  Discharge Recommendations: Anticipate that the patient will have no further speech therapy needs after discharge from the hospital   Therapy Recommendations Upon DC: Patient / Family / Caregiver Education        Patient / Family Goals  Patient / Family Goal #1: \"I just spilled my coffee all over myself\"  Short Term Goals  Short Term Goal # 1: Pt will consume least restrictive diet with no overt s/sx of aspiration or decline in pulmonary status      Claritza Salinas, SLP   "

## 2025-02-21 NOTE — WOUND TEAM
Assisted Wound RN (Haylie Luque) with skin assessment and wound consult evaluation for pressure injury. Additional staff necessary for turning, repositioning patient, and determining progress, assessment and staging of pressure injuries and/or complicated wound care.

## 2025-02-21 NOTE — ASSESSMENT & PLAN NOTE
Secondary to severe renal artery stenosis  Requiring HD during last hospitalization early February 2025  New baseline creatinine 1.5-2  Creatinine today 1.82 --> 1.71  - Avoid nephrotoxins; renally dose medications  - Repeat BMP in AM

## 2025-02-21 NOTE — PROGRESS NOTES
Bedside report received from off going RN/tech: Janette, assumed care of patient.     Fall Risk Score: HIGH RISK  Fall risk interventions in place: Place yellow fall risk ID band on patient, Provide patient/family education based on risk assessment, Educate patient/family to call staff for assistance when getting out of bed, Place fall precaution signage outside patient door, Utilize bed/chair fall alarm, and Bed alarm connected correctly  Bed type: Low air loss (Mauricio Score less than 17 interventions in place)  Patient on cardiac monitor: Yes  IVF/IV medications: Not Applicable   Oxygen: How many liters 2L and Traced the line to wall oxygen  Bedside sitter: Not Applicable   Isolation: Not applicable

## 2025-02-21 NOTE — ASSESSMENT & PLAN NOTE
Continue monitoring  Check protein creatinine ratio  Bladder scan  BMP in a.m.  US renal today  F/u bmp in am.

## 2025-02-22 PROBLEM — N17.9 AKI (ACUTE KIDNEY INJURY) (HCC): Status: RESOLVED | Noted: 2025-02-20 | Resolved: 2025-02-22

## 2025-02-22 LAB
ALBUMIN SERPL BCP-MCNC: 3.2 G/DL (ref 3.2–4.9)
BUN SERPL-MCNC: 35 MG/DL (ref 8–22)
CALCIUM ALBUM COR SERPL-MCNC: 9.6 MG/DL (ref 8.5–10.5)
CALCIUM SERPL-MCNC: 9 MG/DL (ref 8.5–10.5)
CHLORIDE SERPL-SCNC: 90 MMOL/L (ref 96–112)
CO2 SERPL-SCNC: 35 MMOL/L (ref 20–33)
CREAT SERPL-MCNC: 1.82 MG/DL (ref 0.5–1.4)
GFR SERPLBLD CREATININE-BSD FMLA CKD-EPI: 40 ML/MIN/1.73 M 2
GLUCOSE SERPL-MCNC: 112 MG/DL (ref 65–99)
PHOSPHATE SERPL-MCNC: 1.8 MG/DL (ref 2.5–4.5)
POTASSIUM SERPL-SCNC: 3.3 MMOL/L (ref 3.6–5.5)
SODIUM SERPL-SCNC: 134 MMOL/L (ref 135–145)

## 2025-02-22 PROCEDURE — 770006 HCHG ROOM/CARE - MED/SURG/GYN SEMI*

## 2025-02-22 PROCEDURE — 80069 RENAL FUNCTION PANEL: CPT

## 2025-02-22 PROCEDURE — 700111 HCHG RX REV CODE 636 W/ 250 OVERRIDE (IP): Performed by: HOSPITALIST

## 2025-02-22 PROCEDURE — A9270 NON-COVERED ITEM OR SERVICE: HCPCS | Performed by: STUDENT IN AN ORGANIZED HEALTH CARE EDUCATION/TRAINING PROGRAM

## 2025-02-22 PROCEDURE — 99233 SBSQ HOSP IP/OBS HIGH 50: CPT | Performed by: STUDENT IN AN ORGANIZED HEALTH CARE EDUCATION/TRAINING PROGRAM

## 2025-02-22 PROCEDURE — 36415 COLL VENOUS BLD VENIPUNCTURE: CPT

## 2025-02-22 PROCEDURE — 700102 HCHG RX REV CODE 250 W/ 637 OVERRIDE(OP): Performed by: STUDENT IN AN ORGANIZED HEALTH CARE EDUCATION/TRAINING PROGRAM

## 2025-02-22 PROCEDURE — 700102 HCHG RX REV CODE 250 W/ 637 OVERRIDE(OP): Performed by: HOSPITALIST

## 2025-02-22 PROCEDURE — A9270 NON-COVERED ITEM OR SERVICE: HCPCS | Performed by: HOSPITALIST

## 2025-02-22 PROCEDURE — 700111 HCHG RX REV CODE 636 W/ 250 OVERRIDE (IP): Performed by: STUDENT IN AN ORGANIZED HEALTH CARE EDUCATION/TRAINING PROGRAM

## 2025-02-22 RX ORDER — MAGNESIUM SULFATE HEPTAHYDRATE 40 MG/ML
2 INJECTION, SOLUTION INTRAVENOUS ONCE
Status: COMPLETED | OUTPATIENT
Start: 2025-02-22 | End: 2025-02-22

## 2025-02-22 RX ORDER — POTASSIUM CHLORIDE 1500 MG/1
40 TABLET, EXTENDED RELEASE ORAL ONCE
Status: COMPLETED | OUTPATIENT
Start: 2025-02-22 | End: 2025-02-22

## 2025-02-22 RX ADMIN — UMECLIDINIUM BROMIDE AND VILANTEROL TRIFENATATE 1 PUFF: 62.5; 25 POWDER RESPIRATORY (INHALATION) at 05:30

## 2025-02-22 RX ADMIN — OXYCODONE HYDROCHLORIDE 5 MG: 5 TABLET ORAL at 02:21

## 2025-02-22 RX ADMIN — PRAMIPEXOLE DIHYDROCHLORIDE 0.5 MG: 0.5 TABLET ORAL at 05:26

## 2025-02-22 RX ADMIN — ISOSORBIDE MONONITRATE 240 MG: 60 TABLET, EXTENDED RELEASE ORAL at 05:29

## 2025-02-22 RX ADMIN — FAMOTIDINE 20 MG: 20 TABLET, FILM COATED ORAL at 05:27

## 2025-02-22 RX ADMIN — AMLODIPINE BESYLATE 10 MG: 10 TABLET ORAL at 05:27

## 2025-02-22 RX ADMIN — FERROUS SULFATE TAB 325 MG (65 MG ELEMENTAL FE) 325 MG: 325 (65 FE) TAB at 05:27

## 2025-02-22 RX ADMIN — ATORVASTATIN CALCIUM 40 MG: 40 TABLET, FILM COATED ORAL at 05:26

## 2025-02-22 RX ADMIN — SENNOSIDES AND DOCUSATE SODIUM 2 TABLET: 50; 8.6 TABLET ORAL at 17:30

## 2025-02-22 RX ADMIN — MAGNESIUM SULFATE HEPTAHYDRATE 2 G: 2 INJECTION, SOLUTION INTRAVENOUS at 08:10

## 2025-02-22 RX ADMIN — CLOPIDOGREL BISULFATE 75 MG: 75 TABLET ORAL at 05:27

## 2025-02-22 RX ADMIN — GABAPENTIN 300 MG: 300 CAPSULE ORAL at 05:28

## 2025-02-22 RX ADMIN — HEPARIN SODIUM 5000 UNITS: 5000 INJECTION, SOLUTION INTRAVENOUS; SUBCUTANEOUS at 15:29

## 2025-02-22 RX ADMIN — HEPARIN SODIUM 5000 UNITS: 5000 INJECTION, SOLUTION INTRAVENOUS; SUBCUTANEOUS at 05:28

## 2025-02-22 RX ADMIN — HEPARIN SODIUM 5000 UNITS: 5000 INJECTION, SOLUTION INTRAVENOUS; SUBCUTANEOUS at 20:21

## 2025-02-22 RX ADMIN — POTASSIUM CHLORIDE 40 MEQ: 1500 TABLET, EXTENDED RELEASE ORAL at 08:08

## 2025-02-22 RX ADMIN — LEVOTHYROXINE SODIUM 150 MCG: 0.15 TABLET ORAL at 05:25

## 2025-02-22 ASSESSMENT — PAIN DESCRIPTION - PAIN TYPE
TYPE: ACUTE PAIN
TYPE: ACUTE PAIN

## 2025-02-22 ASSESSMENT — ENCOUNTER SYMPTOMS
SHORTNESS OF BREATH: 1
NAUSEA: 0
ABDOMINAL PAIN: 0
VOMITING: 0
FEVER: 0

## 2025-02-22 NOTE — PROGRESS NOTES
Hospital Medicine Daily Progress Note    Date of Service  2/22/2025    Chief Complaint  bP Peters is a 69 y.o. male admitted 2/20/2025 with worsening shortness of breath.    Hospital Course  Pb Peters is a 69-year-old male with PMHx hypothyroidism, gout, anxiety, depression, HTN, CVA, AAA, GERD, CKD, secondary para hypothyroidism.  Admitted from [place] for worsening shortness of breath.    Per history-patient required dialysis in the recent past.  He was weaned off dialysis.  He developed worsening shortness of breath.  He presented to the ER for further evaluation.  Creatinine 2.17 which is slightly increased from previous.  Transferred to [facility] for management of worsening kidney function.    Creatinine 2.17 on admission.  Patient continues to have good urine output.  Creatinine improved to 1.7 and 1.8 at time of discharge.  Electrolytes remained stable.  No indications for urgent dialysis at this time.  Patient will need to follow-up with outpatient nephrology moving forward.    CXR showing hypoinflation.  Patient continued to require 2 to 3 L/min supplemental O2.  He needed a new oxygen order completed.  This has been ordered and delivered prior to discharge.    Interval Problem Update  2/22: Overnight vitals notable for SBP ranging 135 through 168.  Patient remains on 2 L/min supplemental O2.  Creatinine improved to 1.82 from 2.26 yesterday.  Potassium 3.3; replaced.  Magnesium 1.8; replaced.  Patient is medically clear for discharge but is awaiting oxygen delivery.  Apparently, patient does not have electricity in his trailer to allow for concentrator delivery.    I have discussed this patient's plan of care and discharge plan at IDT rounds today with Case Management, Nursing, Nursing leadership, and other members of the IDT team.    Consultants/Specialty  None at this time    Code Status  Full Code    Disposition  The patient is medically cleared for discharge to home or a post-acute  facility.  Anticipate discharge to: home with close outpatient follow-up    I have placed the appropriate orders for post-discharge needs.    Review of Systems  Review of Systems   Constitutional:  Positive for malaise/fatigue. Negative for fever.   Respiratory:  Positive for shortness of breath.    Cardiovascular:  Negative for chest pain and leg swelling.   Gastrointestinal:  Negative for abdominal pain, nausea and vomiting.        Physical Exam  Temp:  [36.3 °C (97.3 °F)-36.8 °C (98.2 °F)] 36.7 °C (98 °F)  Pulse:  [65-74] 69  Resp:  [16-18] 17  BP: (135-168)/(69-76) 157/69  SpO2:  [90 %-96 %] 96 %    Physical Exam  Vitals and nursing note reviewed.   Constitutional:       General: He is not in acute distress.     Appearance: Normal appearance. He is ill-appearing.   Cardiovascular:      Rate and Rhythm: Normal rate and regular rhythm.   Pulmonary:      Effort: Pulmonary effort is normal. No respiratory distress.      Breath sounds: Normal breath sounds. No wheezing.   Abdominal:      General: Bowel sounds are normal. There is no distension.      Palpations: Abdomen is soft.      Tenderness: There is no abdominal tenderness.   Skin:     General: Skin is warm and dry.      Coloration: Skin is pale.   Neurological:      Mental Status: He is alert and oriented to person, place, and time. Mental status is at baseline.   Psychiatric:         Mood and Affect: Mood normal.         Behavior: Behavior normal.         Fluids    Intake/Output Summary (Last 24 hours) at 2/22/2025 1509  Last data filed at 2/21/2025 2300  Gross per 24 hour   Intake --   Output 425 ml   Net -425 ml        Laboratory  Recent Labs     02/20/25  1403 02/21/25  0045   WBC 7.1 7.9   RBC 3.68* 3.56*   HEMOGLOBIN 11.1* 10.8*   HEMATOCRIT 34.0* 33.4*   MCV 92.4 93.8   MCH 30.2 30.3   MCHC 32.6 32.3   RDW 54.9* 56.6*   PLATELETCT 286 284   MPV 9.8 10.2     Recent Labs     02/20/25  1403 02/21/25  0045 02/22/25  0411   SODIUM 135 136 134*   POTASSIUM 3.3*  3.1* 3.3*   CHLORIDE 84* 85* 90*   CO2 40* 40* 35*   GLUCOSE 93 100* 112*   BUN 30* 36* 35*   CREATININE 1.79* 2.26* 1.82*   CALCIUM 9.9 9.6 9.0                   Imaging  DX-OUTSIDE IMAGES-DX CHEST   Final Result      DX-CHEST-PORTABLE (1 VIEW)   Final Result      1.  Improving small right pleural effusion and adjacent right lower lung airspace disease.   2.  Improving left lower lung airspace disease.      US-RENAL    (Results Pending)        Assessment/Plan  Hypokalemia  Assessment & Plan  Potassium 3.3  Replace  - Repeat levels in a.m.    Former smoker  Assessment & Plan  Needs to keep without smoking    Chronic bronchitis (HCC)- (present on admission)  Assessment & Plan  Patient former smoker  Continue RT per protocol  Patient is on 3 L of oxygen at baseline  - Home O2 ordered today    Advance care planning- (present on admission)  Assessment & Plan  I discussed with patient regarding advance care planning, we discussed regarding CODE STATUS, we discussed regarding CPR intubation mechanical ventilation, discussed regarding side effects/complications, discussed regarding benefits of these 2 procedures, patient had time to ask questions, all questions were answered, spent 16 minutes.  After discussion patient would like his CODE STATUS to be full code.    (HFpEF) heart failure with preserved ejection fraction (HCC)- (present on admission)  Assessment & Plan  Echocardiogram: EF 65%; RV function normal  Euvolemic    Acute on chronic renal failure (HCC)- (present on admission)  Assessment & Plan  Secondary to severe renal artery stenosis  Requiring HD during last hospitalization early February 2025  New baseline creatinine 1.5-2  Creatinine today 1.82  - Avoid nephrotoxins; renally dose medications  - Repeat BMP in a.m.      Hypertension, essential- (present on admission)  Assessment & Plan  SBP ranging 135 through 168  - Continue home amlodipine  - Continue home Imdur    Abdominal aortic aneurysm (AAA) without  rupture (HCC)- (present on admission)  Assessment & Plan  Measuring 3.3 cm on CTA from 2016  Need follow-up as outpatient         VTE prophylaxis:   SCDs/TEDs   heparin ppx      I have performed a physical exam and reviewed and updated ROS and Plan today (2/22/2025). In review of yesterday's note (2/21/2025), there are no changes except as documented above.

## 2025-02-22 NOTE — PROGRESS NOTES
4 Eyes Skin Assessment Completed by HERMANN Durham and HERMANN Guevara.    Head WDL  Ears Redness and Blanching  Nose WDL  Mouth WDL  Neck WDL  Breast/Chest Bruising and Scar  Shoulder Blades WDL  Spine WDL  (R) Arm/Elbow/Hand Redness and Blanching  (L) Arm/Elbow/Hand Redness, Blanching, and Abrasion  Abdomen Scar and Bruising  Groin WDL  Scrotum/Coccyx/Buttocks WDL  (R) Leg Scar and Scab  (L) Leg Scar and Scab  (R) Heel/Foot/Toe Callous, flaky, dry  (L) Heel/Foot/Toe Callous, flaky, dry          Devices In Places Blood Pressure Cuff and SCD's      Interventions In Place Gray Ear Foams, NC W/Ear Foams, Heel Mepilex, Sacral Mepilex, TAP System, and Pillows    Possible Skin Injury Yes    Pictures Uploaded Into Epic Yes  Wound Consult Placed N/A  RN Wound Prevention Protocol Ordered No

## 2025-02-22 NOTE — CARE PLAN
The patient is Watcher - Medium risk of patient condition declining or worsening    Shift Goals  Clinical Goals: renal ultrasound, safety, monitor labs  Patient Goals: rest  Family Goals: silvia    Progress made toward(s) clinical / shift goals:    Problem: Knowledge Deficit - Standard  Goal: Patient and family/care givers will demonstrate understanding of plan of care, disease process/condition, diagnostic tests and medications  Outcome: Progressing  Note: Bedside report complete, pt updated on plan of care.     Problem: Pain - Standard  Goal: Alleviation of pain or a reduction in pain to the patient’s comfort goal  Outcome: Progressing  Note: Declines need for intervention     Problem: Fall Risk  Goal: Patient will remain free from falls  Outcome: Progressing  Note: Fall precautions in place. Bed in lowest position. Non-skid socks in place. Personal possessions within reach. Mobility sign on door. Bed-alarm on. Call light within reach. Pt educated regarding fall prevention and states understanding.

## 2025-02-22 NOTE — DISCHARGE PLANNING
Case Management Discharge Planning    Admission Date: 2/20/2025  GMLOS: 3  ALOS: 1    6-Clicks ADL Score: 24  6-Clicks Mobility Score: 19    Anticipated Discharge Dispo: Discharge Disposition: Discharged to home/self care (01)  Discharge Address: [address] [facility] [address]    DME Needed: Yes    DME Ordered: No    Action(s) Taken: DC Assessment Complete (See below)    RN CM met with patient at bedside to complete assessment and discuss discharge plan. Pt reports he lives alone in a trailer with 2 VERÓNICA, he is ambulatory and does not use any assistive devices.     Patient reports he is legally blind and his daughter who lives in the trailer next to his drives him around and assists with IADLs. [facility]. Pt is independent with ADLs.    Patient reports his trailer is on a family's property in [facility] and has no electricity. He reports having many generators but they are all broken. RN CM referred patient to community health worker.     The patient's PCP is Melissa Chew; preferred pharmacy is [facility]. Insurance coverage via Medicare. Patient has income from [employer] of $1810 per month.     Pt denies smoking, drinking alcohol or using recreational drugs. No MH concerns reported.     Pt stated that [facility] is no longer willing to supply oxygen tanks to him since he has no electricity in his trailer and cannot plug in his concentrator. RN CM discussed with pt the possibility of moving back to [place] vs [facility].     Patient will need to apply for Medicaid and qualify for the waiver program for possible placement in group home. Also discussed that patient speaks with his daughter regarding repairing their generator in the mean time.  Pt verbalized understanding.     PT/OT cleared patient; no post acute needs. Pt will need assistance with transportation back home.     Patient will need to fix his generator so he can have electricity in his trailer and be able to plug in his concentrator. VINNY  will follow and assist with DCP.    Escalations Completed: None    Medically Clear: No    Next Steps: F/U with medical team regarding D/C needs/ barriers.     Barriers to Discharge: Medical clearance    Is the patient up for discharge tomorrow: No    Care Transition Team Assessment    Information Source  Orientation Level: Oriented X4  Information Given By: Patient  Informant's Name: Pb Peters  Who is responsible for making decisions for patient? : Patient    Readmission Evaluation  Is this a readmission?: Yes - unplanned readmission  Did you understand your discharge instructions?: Yes  Did you have enough support after your last discharge?: Yes    Elopement Risk  Legal Hold: No  Ambulatory or Self Mobile in Wheelchair: No-Not an Elopement Risk  Disoriented: No  Psychiatric Symptoms: None  History of Wandering: No  Elopement this Admit: No  Vocalizing Wanting to Leave: No  Displays Behaviors, Body Language Wanting to Leave: No-Not at Risk for Elopement  Elopement Risk: Not at Risk for Elopement    Interdisciplinary Discharge Planning  Lives with - Patient's Self Care Capacity: Alone and Able to Care For Self  Patient or legal guardian wants to designate a caregiver: Yes  Caregiver name: Devi Peters  Caregiver contact info: [phone]  ([facility]) Authorization for Release of Health Information has been completed: Yes  Support Systems: Children  Housing / Facility: Motor Home (without power)  Prior Services: None    Discharge Preparedness  What is your plan after discharge?: Home with help  What are your discharge supports?: Child  Prior Functional Level: Ambulatory, Independent with Activities of Daily Living, Independent with Medication Management    Functional Assesment  Prior Functional Level: Ambulatory, Independent with Activities of Daily Living, Independent with Medication Management    Finances  Financial Barriers to Discharge: No  Prescription Coverage: Yes    Vision / Hearing Impairment  Vision  Impairment : Yes  Right Eye Vision: Blind  Left Eye Vision: Blind  Hearing Impairment : No    Advance Directive  Advance Directive?: None    Domestic Abuse  Possible Abuse/Neglect Reported to:: Not Applicable    Psychological Assessment  History of Substance Abuse: None  History of Psychiatric Problems: No    Discharge Risks or Barriers  Discharge risks or barriers?: Complex medical needs  Patient risk factors: Complex medical needs    Anticipated Discharge Information  Discharge Disposition: Discharged to home/self care (01)  Discharge Address: [address] [place] [id number]

## 2025-02-22 NOTE — DISCHARGE SUMMARY
Discharge Summary    CHIEF COMPLAINT ON ADMISSION  No chief complaint on file.      Reason for Admission  Medical (Acute renal failure)     Admission Date  2/20/2025    CODE STATUS  Full Code    HPI & HOSPITAL COURSE    Pb Peters is a 69-year-old male with PMHx hypothyroidism, gout, anxiety, depression, HTN, CVA, AAA, GERD, CKD, secondary para hypothyroidism.  Admitted from [place] for worsening shortness of breath.    Per history-patient required dialysis in the recent past.  He was weaned off dialysis.  He developed worsening shortness of breath.  He presented to the ER for further evaluation.  Creatinine 2.17 which is slightly increased from previous.  Transferred to [facility] for management of worsening kidney function.    Creatinine 2.17 on admission.  Patient continues to have good urine output.  Creatinine improved to 1.7 and 1.8 at time of discharge.  Electrolytes remained stable.  No indications for urgent dialysis at this time.  Patient will need to follow-up with outpatient nephrology moving forward.    CXR showing hypoinflation.  Patient continued to require 2 to 3 L/min supplemental O2.  He needed a new oxygen order completed.  This has been ordered and delivered prior to discharge.    Therefore, he is discharged in fair and stable condition to home with close outpatient follow-up.    The patient met 2-midnight criteria for an inpatient stay at the time of discharge.    Discharge Date  2/22/2025    FOLLOW UP ITEMS POST DISCHARGE  Follow-up with PCP 2 to 3 days  Establish with nephrology-referral placed    DISCHARGE DIAGNOSES  Principal Problem:    THERESA (acute kidney injury) (HCC) (POA: Yes)  Active Problems:    Abdominal aortic aneurysm (AAA) without rupture (HCC) (POA: Yes)    Hypertension, essential (POA: Yes)    Acute on chronic renal failure (HCC) (POA: Yes)    (HFpEF) heart failure with preserved ejection fraction (HCC) (POA: Yes)    Advance care planning (POA: Yes)    Chronic bronchitis  (HCC) (POA: Yes)    Former smoker (POA: Unknown)    Hypokalemia (POA: Unknown)  Resolved Problems:    * No resolved hospital problems. *      FOLLOW UP  No future appointments.  CAROLINA Simpson  [address] [facility] [address]  [phone]    Go on 3/7/2025  please check in at noon for a hospital follow up.      MEDICATIONS ON DISCHARGE     Medication List        ASK your doctor about these medications        Instructions   allopurinol 100 MG Tabs  Commonly known as: Zyloprim   Take 100 mg by mouth every day.  Dose: 100 mg     amLODIPine 10 MG Tabs  Commonly known as: Norvasc   Take 10 mg by mouth every day. Hold if BP<100/50 or P,60  Dose: 10 mg     Anoro Ellipta 62.5-25 MCG/ACT Aepb inhaler  Generic drug: umeclidinium-vilanterol   Inhale 1 Puff every day.  Dose: 1 Puff     atorvastatin 40 MG Tabs  Commonly known as: Lipitor   Take 1 Tablet by mouth every day.  Dose: 1 Tablet     B-12 1000 MCG Tabs   Take 1,000 mcg by mouth every day.  Dose: 1,000 mcg     clopidogrel 75 MG Tabs  Commonly known as: Plavix   Take 75 mg by mouth every day.  Dose: 75 mg     famotidine 40 MG Tabs  Commonly known as: Pepcid   Take 40 mg by mouth every day.  Dose: 40 mg     ferrous sulfate 325 (65 Fe) MG tablet   Take 325 mg by mouth every day.  Dose: 325 mg     gabapentin 300 MG Caps  Commonly known as: Neurontin   Take 300 mg by mouth every day.  Dose: 300 mg     isosorbide mononitrate 120 MG CR tablet  Commonly known as: Imdur   Take 240 mg by mouth every morning.  Dose: 240 mg     Jardiance 10 MG Tabs tablet  Generic drug: Empagliflozin   Take 10 mg by mouth every day.  Dose: 10 mg     levothyroxine 150 MCG Tabs  Commonly known as: Synthroid   Take 150 mcg by mouth every morning on an empty stomach.  Dose: 150 mcg     Mag-Oxide 200 MG Tabs  Generic drug: Magnesium Oxide -Mg Supplement   Take 200 mg by mouth every day.  Dose: 200 mg     pramipexole 0.5 MG Tabs  Commonly known as: Mirapex   Take 0.5 mg by mouth  every day.  Dose: 0.5 mg              Allergies  Allergies   Allergen Reactions    Lisinopril Unspecified     Per pt damages kidneys       DIET  Orders Placed This Encounter   Procedures    Diet Order Diet: Renal (ASSIST w/TRAY SET UP); Miscellaneous modifications: (optional): [employer]     Standing Status:   Standing     Number of Occurrences:   1     Diet::   Renal [8]              ASSIST w/TRAY SET UP     Miscellaneous modifications: (optional):   [employer]  [2]       ACTIVITY  As tolerated.  Weight bearing as tolerated    CONSULTATIONS  None     PROCEDURES  None     LABORATORY  Lab Results   Component Value Date    SODIUM 134 (L) 02/22/2025    POTASSIUM 3.3 (L) 02/22/2025    CHLORIDE 90 (L) 02/22/2025    CO2 35 (H) 02/22/2025    GLUCOSE 112 (H) 02/22/2025    BUN 35 (H) 02/22/2025    CREATININE 1.82 (H) 02/22/2025        Lab Results   Component Value Date    WBC 7.9 02/21/2025    HEMOGLOBIN 10.8 (L) 02/21/2025    HEMATOCRIT 33.4 (L) 02/21/2025    PLATELETCT 284 02/21/2025      DX-OUTSIDE IMAGES-DX CHEST   Final Result      DX-CHEST-PORTABLE (1 VIEW)   Final Result      1.  Improving small right pleural effusion and adjacent right lower lung airspace disease.   2.  Improving left lower lung airspace disease.      US-RENAL    (Results Pending)         Total time of the discharge process exceeds 48 minutes.

## 2025-02-23 LAB
ANION GAP SERPL CALC-SCNC: 11 MMOL/L (ref 7–16)
BUN SERPL-MCNC: 32 MG/DL (ref 8–22)
CALCIUM SERPL-MCNC: 9.1 MG/DL (ref 8.5–10.5)
CHLORIDE SERPL-SCNC: 93 MMOL/L (ref 96–112)
CO2 SERPL-SCNC: 31 MMOL/L (ref 20–33)
CREAT SERPL-MCNC: 1.71 MG/DL (ref 0.5–1.4)
GFR SERPLBLD CREATININE-BSD FMLA CKD-EPI: 43 ML/MIN/1.73 M 2
GLUCOSE SERPL-MCNC: 105 MG/DL (ref 65–99)
MAGNESIUM SERPL-MCNC: 2.6 MG/DL (ref 1.5–2.5)
POTASSIUM SERPL-SCNC: 3.6 MMOL/L (ref 3.6–5.5)
SODIUM SERPL-SCNC: 135 MMOL/L (ref 135–145)

## 2025-02-23 PROCEDURE — 36415 COLL VENOUS BLD VENIPUNCTURE: CPT

## 2025-02-23 PROCEDURE — 99232 SBSQ HOSP IP/OBS MODERATE 35: CPT | Performed by: STUDENT IN AN ORGANIZED HEALTH CARE EDUCATION/TRAINING PROGRAM

## 2025-02-23 PROCEDURE — 700111 HCHG RX REV CODE 636 W/ 250 OVERRIDE (IP): Performed by: HOSPITALIST

## 2025-02-23 PROCEDURE — 770006 HCHG ROOM/CARE - MED/SURG/GYN SEMI*

## 2025-02-23 PROCEDURE — 83735 ASSAY OF MAGNESIUM: CPT

## 2025-02-23 PROCEDURE — 700102 HCHG RX REV CODE 250 W/ 637 OVERRIDE(OP): Performed by: HOSPITALIST

## 2025-02-23 PROCEDURE — 80048 BASIC METABOLIC PNL TOTAL CA: CPT

## 2025-02-23 PROCEDURE — A9270 NON-COVERED ITEM OR SERVICE: HCPCS | Performed by: HOSPITALIST

## 2025-02-23 RX ADMIN — LEVOTHYROXINE SODIUM 150 MCG: 0.15 TABLET ORAL at 04:18

## 2025-02-23 RX ADMIN — AMLODIPINE BESYLATE 10 MG: 10 TABLET ORAL at 04:16

## 2025-02-23 RX ADMIN — HEPARIN SODIUM 5000 UNITS: 5000 INJECTION, SOLUTION INTRAVENOUS; SUBCUTANEOUS at 04:19

## 2025-02-23 RX ADMIN — ATORVASTATIN CALCIUM 40 MG: 40 TABLET, FILM COATED ORAL at 04:17

## 2025-02-23 RX ADMIN — OXYCODONE HYDROCHLORIDE 5 MG: 5 TABLET ORAL at 02:38

## 2025-02-23 RX ADMIN — HEPARIN SODIUM 5000 UNITS: 5000 INJECTION, SOLUTION INTRAVENOUS; SUBCUTANEOUS at 16:47

## 2025-02-23 RX ADMIN — CLOPIDOGREL BISULFATE 75 MG: 75 TABLET ORAL at 04:16

## 2025-02-23 RX ADMIN — HEPARIN SODIUM 5000 UNITS: 5000 INJECTION, SOLUTION INTRAVENOUS; SUBCUTANEOUS at 21:43

## 2025-02-23 RX ADMIN — FAMOTIDINE 20 MG: 20 TABLET, FILM COATED ORAL at 04:17

## 2025-02-23 RX ADMIN — GABAPENTIN 300 MG: 300 CAPSULE ORAL at 04:17

## 2025-02-23 RX ADMIN — ISOSORBIDE MONONITRATE 240 MG: 60 TABLET, EXTENDED RELEASE ORAL at 04:18

## 2025-02-23 RX ADMIN — SENNOSIDES AND DOCUSATE SODIUM 2 TABLET: 50; 8.6 TABLET ORAL at 16:47

## 2025-02-23 RX ADMIN — FERROUS SULFATE TAB 325 MG (65 MG ELEMENTAL FE) 325 MG: 325 (65 FE) TAB at 04:17

## 2025-02-23 RX ADMIN — PRAMIPEXOLE DIHYDROCHLORIDE 0.5 MG: 0.5 TABLET ORAL at 04:16

## 2025-02-23 RX ADMIN — UMECLIDINIUM BROMIDE AND VILANTEROL TRIFENATATE 1 PUFF: 62.5; 25 POWDER RESPIRATORY (INHALATION) at 04:20

## 2025-02-23 RX ADMIN — OXYCODONE HYDROCHLORIDE 5 MG: 5 TABLET ORAL at 19:54

## 2025-02-23 ASSESSMENT — ENCOUNTER SYMPTOMS
NAUSEA: 0
ABDOMINAL PAIN: 0
FEVER: 0
VOMITING: 0
SHORTNESS OF BREATH: 1

## 2025-02-23 ASSESSMENT — PAIN DESCRIPTION - PAIN TYPE
TYPE: CHRONIC PAIN
TYPE: ACUTE PAIN
TYPE: ACUTE PAIN

## 2025-02-23 NOTE — PROGRESS NOTES
Hospital Medicine Daily Progress Note    Date of Service  2/23/2025    Chief Complaint  Pb Peters is a 69 y.o. male admitted 2/20/2025 with worsening shortness of breath.    Hospital Course  Pb Peters is a 69-year-old male with PMHx hypothyroidism, gout, anxiety, depression, HTN, CVA, AAA, GERD, CKD, secondary para hypothyroidism.  Admitted from [facility] for worsening shortness of breath.    Per history-patient required dialysis in the recent past.  He was weaned off dialysis.  He developed worsening shortness of breath.  He presented to the ER for further evaluation.  Creatinine 2.17 which is slightly increased from previous.  Transferred to [facility] for management of worsening kidney function.    Creatinine 2.17 on admission.  Patient continues to have good urine output.  Creatinine improved to 1.7 and 1.8 at time of discharge.  Electrolytes remained stable.  No indications for urgent dialysis at this time.  Patient will need to follow-up with outpatient nephrology moving forward.    CXR showing hypoinflation.  Patient continued to require 2 to 3 L/min supplemental O2.  He needed a new oxygen order completed.  This has been ordered and delivered prior to discharge.    Interval Problem Update  2/22: Overnight vitals notable for SBP ranging 135 through 168.  Patient remains on 2 L/min supplemental O2.  Creatinine improved to 1.82 from 2.26 yesterday.  Potassium 3.3; replaced.  Magnesium 1.8; replaced.  Patient is medically clear for discharge but is awaiting oxygen delivery.  Apparently, patient does not have electricity in his trailer to allow for concentrator delivery.    2/23: Vitals notable for SBP ranging 129 through 168.  Patient remains on 2 L/min supplemental O2.  Potassium 3.6; replaced.  Creatinine 1.71 from 1.82.  Per communication with patient's family-trailer is run off solar power.  This is not enough to power patient's oxygen concentrator.    I have discussed this patient's plan  of care and discharge plan at IDT rounds today with Case Management, Nursing, Nursing leadership, and other members of the IDT team.    Consultants/Specialty  None at this time    Code Status  Full Code    Disposition  Medically Cleared  I have placed the appropriate orders for post-discharge needs.    Review of Systems  Review of Systems   Constitutional:  Positive for malaise/fatigue. Negative for fever.   Respiratory:  Positive for shortness of breath.    Cardiovascular:  Negative for chest pain and leg swelling.   Gastrointestinal:  Negative for abdominal pain, nausea and vomiting.        Physical Exam  Temp:  [36.7 °C (98 °F)-37.3 °C (99.2 °F)] 36.8 °C (98.3 °F)  Pulse:  [60-79] 76  Resp:  [16-18] 16  BP: (129-157)/(69-76) 154/76  SpO2:  [91 %-96 %] 94 %    Physical Exam  Vitals and nursing note reviewed.   Constitutional:       General: He is not in acute distress.     Appearance: Normal appearance. He is ill-appearing.   Cardiovascular:      Rate and Rhythm: Normal rate and regular rhythm.   Pulmonary:      Effort: Pulmonary effort is normal. No respiratory distress.      Breath sounds: Normal breath sounds. No wheezing.   Musculoskeletal:         General: No swelling.   Skin:     General: Skin is warm and dry.      Coloration: Skin is pale.   Neurological:      Mental Status: He is alert and oriented to person, place, and time. Mental status is at baseline.   Psychiatric:         Mood and Affect: Mood normal.         Behavior: Behavior normal.         Fluids    Intake/Output Summary (Last 24 hours) at 2/23/2025 1157  Last data filed at 2/23/2025 0852  Gross per 24 hour   Intake 600 ml   Output 350 ml   Net 250 ml        Laboratory  Recent Labs     02/20/25  1403 02/21/25  0045   WBC 7.1 7.9   RBC 3.68* 3.56*   HEMOGLOBIN 11.1* 10.8*   HEMATOCRIT 34.0* 33.4*   MCV 92.4 93.8   MCH 30.2 30.3   MCHC 32.6 32.3   RDW 54.9* 56.6*   PLATELETCT 286 284   MPV 9.8 10.2     Recent Labs     02/21/25  0045 02/22/25  0411  02/23/25  0431   SODIUM 136 134* 135   POTASSIUM 3.1* 3.3* 3.6   CHLORIDE 85* 90* 93*   CO2 40* 35* 31   GLUCOSE 100* 112* 105*   BUN 36* 35* 32*   CREATININE 2.26* 1.82* 1.71*   CALCIUM 9.6 9.0 9.1                   Imaging  DX-OUTSIDE IMAGES-DX CHEST   Final Result      DX-CHEST-PORTABLE (1 VIEW)   Final Result      1.  Improving small right pleural effusion and adjacent right lower lung airspace disease.   2.  Improving left lower lung airspace disease.           Assessment/Plan  * Chronic bronchitis (HCC)- (present on admission)  Assessment & Plan  Patient former smoker  Continue RT per protocol  Patient is on 3 L of oxygen at baseline  - Home O2 ordered today; difficult discharge planning due to solar powered trailer. Does not provide enough electricity to power concentrator     Hypokalemia  Assessment & Plan  Potassium 3.6  Replaced  - Repeat BMP in a.m.    Former smoker  Assessment & Plan  Needs to keep without smoking    Advance care planning- (present on admission)  Assessment & Plan  I discussed with patient regarding advance care planning, we discussed regarding CODE STATUS, we discussed regarding CPR intubation mechanical ventilation, discussed regarding side effects/complications, discussed regarding benefits of these 2 procedures, patient had time to ask questions, all questions were answered, spent 16 minutes.  After discussion patient would like his CODE STATUS to be full code.    (HFpEF) heart failure with preserved ejection fraction (HCC)- (present on admission)  Assessment & Plan  Echocardiogram: EF 65%; RV function normal  Euvolemic    Acute on chronic renal failure (HCC)- (present on admission)  Assessment & Plan  Secondary to severe renal artery stenosis  Requiring HD during last hospitalization early February 2025  New baseline creatinine 1.5-2  Creatinine today 1.82 --> 1.71  - Avoid nephrotoxins; renally dose medications  - Repeat BMP in AM      Hypertension, essential- (present on  admission)  Assessment & Plan  SBP ranging 135 through 168  - Continue home amlodipine  - Continue home Imdur    Abdominal aortic aneurysm (AAA) without rupture (HCC)- (present on admission)  Assessment & Plan  Measuring 3.3 cm on CTA from 2016  Need follow-up as outpatient         VTE prophylaxis:   SCDs/TEDs   heparin ppx      I have performed a physical exam and reviewed and updated ROS and Plan today (2/23/2025). In review of yesterday's note (2/22/2025), there are no changes except as documented above.

## 2025-02-23 NOTE — CARE PLAN
The patient is Stable - Low risk of patient condition declining or worsening    Shift Goals  Clinical Goals: Pt will remain free of falls entire shift  Patient Goals: Rest and food  Family Goals: Elías    Progress made toward(s) clinical / shift goals:    Problem: Knowledge Deficit - Standard  Goal: Patient and family/care givers will demonstrate understanding of plan of care, disease process/condition, diagnostic tests and medications  Outcome: Progressing     Problem: Pain - Standard  Goal: Alleviation of pain or a reduction in pain to the patient’s comfort goal  Outcome: Progressing       Patient is not progressing towards the following goals:

## 2025-02-24 LAB
ANION GAP SERPL CALC-SCNC: 8 MMOL/L (ref 7–16)
BUN SERPL-MCNC: 26 MG/DL (ref 8–22)
CALCIUM SERPL-MCNC: 9.4 MG/DL (ref 8.5–10.5)
CHLORIDE SERPL-SCNC: 94 MMOL/L (ref 96–112)
CO2 SERPL-SCNC: 34 MMOL/L (ref 20–33)
CREAT SERPL-MCNC: 1.55 MG/DL (ref 0.5–1.4)
GFR SERPLBLD CREATININE-BSD FMLA CKD-EPI: 48 ML/MIN/1.73 M 2
GLUCOSE SERPL-MCNC: 112 MG/DL (ref 65–99)
MAGNESIUM SERPL-MCNC: 2.5 MG/DL (ref 1.5–2.5)
POTASSIUM SERPL-SCNC: 3.7 MMOL/L (ref 3.6–5.5)
SODIUM SERPL-SCNC: 136 MMOL/L (ref 135–145)

## 2025-02-24 PROCEDURE — 770006 HCHG ROOM/CARE - MED/SURG/GYN SEMI*

## 2025-02-24 PROCEDURE — 99232 SBSQ HOSP IP/OBS MODERATE 35: CPT | Performed by: STUDENT IN AN ORGANIZED HEALTH CARE EDUCATION/TRAINING PROGRAM

## 2025-02-24 PROCEDURE — 700102 HCHG RX REV CODE 250 W/ 637 OVERRIDE(OP): Performed by: HOSPITALIST

## 2025-02-24 PROCEDURE — A9270 NON-COVERED ITEM OR SERVICE: HCPCS | Performed by: HOSPITALIST

## 2025-02-24 PROCEDURE — 36415 COLL VENOUS BLD VENIPUNCTURE: CPT

## 2025-02-24 PROCEDURE — 80048 BASIC METABOLIC PNL TOTAL CA: CPT

## 2025-02-24 PROCEDURE — 83735 ASSAY OF MAGNESIUM: CPT

## 2025-02-24 PROCEDURE — 700111 HCHG RX REV CODE 636 W/ 250 OVERRIDE (IP): Performed by: HOSPITALIST

## 2025-02-24 RX ADMIN — GABAPENTIN 300 MG: 300 CAPSULE ORAL at 04:38

## 2025-02-24 RX ADMIN — ISOSORBIDE MONONITRATE 240 MG: 60 TABLET, EXTENDED RELEASE ORAL at 04:39

## 2025-02-24 RX ADMIN — FERROUS SULFATE TAB 325 MG (65 MG ELEMENTAL FE) 325 MG: 325 (65 FE) TAB at 04:38

## 2025-02-24 RX ADMIN — HEPARIN SODIUM 5000 UNITS: 5000 INJECTION, SOLUTION INTRAVENOUS; SUBCUTANEOUS at 14:37

## 2025-02-24 RX ADMIN — PRAMIPEXOLE DIHYDROCHLORIDE 0.5 MG: 0.5 TABLET ORAL at 04:38

## 2025-02-24 RX ADMIN — ATORVASTATIN CALCIUM 40 MG: 40 TABLET, FILM COATED ORAL at 04:38

## 2025-02-24 RX ADMIN — UMECLIDINIUM BROMIDE AND VILANTEROL TRIFENATATE 1 PUFF: 62.5; 25 POWDER RESPIRATORY (INHALATION) at 04:39

## 2025-02-24 RX ADMIN — AMLODIPINE BESYLATE 10 MG: 10 TABLET ORAL at 04:38

## 2025-02-24 RX ADMIN — HEPARIN SODIUM 5000 UNITS: 5000 INJECTION, SOLUTION INTRAVENOUS; SUBCUTANEOUS at 20:51

## 2025-02-24 RX ADMIN — LEVOTHYROXINE SODIUM 150 MCG: 0.15 TABLET ORAL at 04:38

## 2025-02-24 RX ADMIN — FAMOTIDINE 20 MG: 20 TABLET, FILM COATED ORAL at 04:38

## 2025-02-24 RX ADMIN — HEPARIN SODIUM 5000 UNITS: 5000 INJECTION, SOLUTION INTRAVENOUS; SUBCUTANEOUS at 04:39

## 2025-02-24 RX ADMIN — CLOPIDOGREL BISULFATE 75 MG: 75 TABLET ORAL at 04:38

## 2025-02-24 ASSESSMENT — ENCOUNTER SYMPTOMS
NAUSEA: 0
VOMITING: 0
ABDOMINAL PAIN: 0
FEVER: 0
SHORTNESS OF BREATH: 1

## 2025-02-24 NOTE — DISCHARGE PLANNING
DORISW received a call back from Cindy Coppola with [facility]. She states that she does not have any recommendations or resources for help obtaining a generator or power to the patient's trailer so he can plug in home oxygen.

## 2025-02-24 NOTE — PROGRESS NOTES
4 Eyes Skin Assessment Completed by HERMANN Perry and HERMANN Pulido.    Head WDL  Ears Redness and Blanching  Nose WDL  Mouth WDL  Neck Redness and Blanching  Breast/Chest Scar  Shoulder Blades WDL  Spine WDL  (R) Arm/Elbow/Hand Bruising and Scar  (L) Arm/Elbow/Hand Bruising  Abdomen Scar and Bruising  Groin WDL  Scrotum/Coccyx/Buttocks Redness and Blanching  (R) Leg slightly swollen  (L) Leg slightly swollen  (R) Heel/Foot/Toe Redness and Blanching, dryness    (L) Heel/Foot/Toe Redness and Blanching, dryness, left great toe-redness non blanching              Devices In Places Blood Pressure Cuff and Pulse Ox      Interventions In Place Gray Ear Foams, NC W/Ear Foams, Heel Mepilex, and Q2 Turns    Possible Skin Injury Yes    Pictures Uploaded Into Epic Yes  Wound Consult Placed already following  RN Wound Prevention Protocol Ordered Yes

## 2025-02-24 NOTE — PROGRESS NOTES
Hospital Medicine Daily Progress Note    Date of Service  2/24/2025    Chief Complaint  Pb Peters is a 69 y.o. male admitted 2/20/2025 with worsening shortness of breath.    Hospital Course  Pb Peters is a 69-year-old male with PMHx hypothyroidism, gout, anxiety, depression, HTN, CVA, AAA, GERD, CKD, secondary para hypothyroidism.  Admitted from [place] for worsening shortness of breath.    Per history-patient required dialysis in the recent past.  He was weaned off dialysis.  He developed worsening shortness of breath.  He presented to the ER for further evaluation.  Creatinine 2.17 which is slightly increased from previous.  [place] [facility] [place] function[place]    Creatinine 2.17 on admission.  Patient continues to have good urine output.  Creatinine improved to 1.7 and 1.8 at time of discharge.  Electrolytes remained stable.  No indications for urgent dialysis at this time.  Patient will need to follow-up with outpatient nephrology moving forward.    CXR showing hypoinflation.  Patient continued to require 2 to 3 L/min supplemental O2.  He needed a new oxygen order completed.  This has been ordered and delivered prior to discharge.    Interval Problem Update  2/22: Overnight vitals notable for SBP ranging 135 through 168.  Patient remains on 2 L/min supplemental O2.  Creatinine improved to 1.82 from 2.26 yesterday.  Potassium 3.3; replaced.  Magnesium 1.8; replaced.  Patient is medically clear for discharge but is awaiting oxygen delivery.  Apparently, patient does not have electricity in his trailer to allow for concentrator delivery.    2/23: Vitals notable for SBP ranging 129 through 168.  Patient remains on 2 L/min supplemental O2.  Potassium 3.6; replaced.  Creatinine 1.71 from 1.82.  Per communication with patient's family-trailer is run off solar power.  This is not enough to power patient's oxygen concentrator.    2/24: Vitals notable for SBP ranging  130 through 173.  Patient remains on 2 L/min supplemental O2.  Weaning challenge unsuccessful yesterday.  Patient will need a baseline of 2 L/min supplemental O2 moving forward.  Working with case management to arrange electricity for concentrator at home.    I have discussed this patient's plan of care and discharge plan at IDT rounds today with Case Management, Nursing, Nursing leadership, and other members of the IDT team.    Consultants/Specialty  None at this time    Code Status  Full Code    Disposition  The patient is medically cleared for discharge to home or a post-acute facility.      I have placed the appropriate orders for post-discharge needs.    Review of Systems  Review of Systems   Constitutional:  Positive for malaise/fatigue. Negative for fever.   Respiratory:  Positive for shortness of breath.    Cardiovascular:  Negative for chest pain and leg swelling.   Gastrointestinal:  Negative for abdominal pain, nausea and vomiting.        Physical Exam  Temp:  [36.4 °C (97.6 °F)-36.8 °C (98.3 °F)] 36.7 °C (98.1 °F)  Pulse:  [69-76] 74  Resp:  [16-18] 16  BP: (130-173)/(72-87) 130/72  SpO2:  [84 %-96 %] 96 %    Physical Exam  Vitals and nursing note reviewed.   Constitutional:       General: He is not in acute distress.     Appearance: Normal appearance. He is ill-appearing.   Cardiovascular:      Rate and Rhythm: Normal rate and regular rhythm.   Pulmonary:      Effort: Pulmonary effort is normal. No respiratory distress.      Breath sounds: Normal breath sounds. No wheezing.   Musculoskeletal:         General: No swelling.   Skin:     General: Skin is warm and dry.      Coloration: Skin is pale.   Neurological:      Mental Status: He is alert and oriented to person, place, and time. Mental status is at baseline.   Psychiatric:         Mood and Affect: Mood normal.         Behavior: Behavior normal.         Fluids    Intake/Output Summary (Last 24 hours) at 2/24/2025 1219  Last data filed at 2/24/2025  0900  Gross per 24 hour   Intake 480 ml   Output 500 ml   Net -20 ml        Laboratory        Recent Labs     02/22/25  0411 02/23/25  0431 02/24/25  0201   SODIUM 134* 135 136   POTASSIUM 3.3* 3.6 3.7   CHLORIDE 90* 93* 94*   CO2 35* 31 34*   GLUCOSE 112* 105* 112*   BUN 35* 32* 26*   CREATININE 1.82* 1.71* 1.55*   CALCIUM 9.0 9.1 9.4                   Imaging  DX-OUTSIDE IMAGES-DX CHEST   Final Result      DX-CHEST-PORTABLE (1 VIEW)   Final Result      1.  Improving small right pleural effusion and adjacent right lower lung airspace disease.   2.  Improving left lower lung airspace disease.           Assessment/Plan  * Chronic bronchitis (HCC)- (present on admission)  Assessment & Plan  Patient former smoker  Continue RT per protocol  Patient is on 3 L of oxygen at baseline  - Home O2 ordered today; difficult discharge planning due to solar powered trailer. Does not provide enough electricity to power concentrator     Hypokalemia  Assessment & Plan  Potassium 3.6  Replaced  - Repeat BMP in a.m.    Former smoker  Assessment & Plan  Needs to keep without smoking    Advance care planning- (present on admission)  Assessment & Plan  I discussed with patient regarding advance care planning, we discussed regarding CODE STATUS, we discussed regarding CPR intubation mechanical ventilation, discussed regarding side effects/complications, discussed regarding benefits of these 2 procedures, patient had time to ask questions, all questions were answered, spent 16 minutes.  After discussion patient would like his CODE STATUS to be full code.    (HFpEF) heart failure with preserved ejection fraction (HCC)- (present on admission)  Assessment & Plan  Echocardiogram: EF 65%; RV function normal  Euvolemic    Acute on chronic renal failure (HCC)- (present on admission)  Assessment & Plan  Secondary to severe renal artery stenosis  Requiring HD during last hospitalization early February 2025  New baseline creatinine 1.5-2  Creatinine  today 1.82 --> 1.71  - Avoid nephrotoxins; renally dose medications  - Repeat BMP in AM      Hypertension, essential- (present on admission)  Assessment & Plan  SBP ranging 135 through 168  - Continue home amlodipine  - Continue home Imdur    Abdominal aortic aneurysm (AAA) without rupture (HCC)- (present on admission)  Assessment & Plan  Measuring 3.3 cm on CTA from 2016  Need follow-up as outpatient         VTE prophylaxis:   SCDs/TEDs      I have performed a physical exam and reviewed and updated ROS and Plan today (2/24/2025). In review of yesterday's note (2/23/2025), there are no changes except as documented above.

## 2025-02-24 NOTE — DISCHARGE PLANNING
Case Management Discharge Planning    Admission Date: 2/20/2025  GMLOS: 3  ALOS: 4    6-Clicks ADL Score: 24  6-Clicks Mobility Score: 19      Anticipated Discharge Dispo: Discharge Disposition: Discharged to home/self care (01)  Discharge Address: [address] [facility] [address]    DME Needed: Yes    DME Ordered: No    Action(s) Taken: RN CM met with pt to discuss issue of no power at home and needing oxygen continuously at discharge. Pt stating there is nothing he can do about it. He cannot afford to stay at a motel or hotel and he has no friends or family that he can stay with. All of his paycheck of around $1600 is gone for bills every month. RN CM called his daughter, Devi, and she confirmed what pt says stating that they do not have power for his oxygen and they have no one he can stay with . There is family in other states and they are not agreeable to take pt. There is no temporary place he can go that she has been able to arrange and they cannot afford to fix generators right now. She says this his PCP, Melissa Chew was trying to place pt at a snf closer to where they live due to his oxygen needs. RN CM to f/u with provider's office.    1836-Spoke with the medical assistant for Melissa Chew to discuss if the provider's office had worked toward any placement for pt yet. She stated no because he transferred out of their facility but to speak with Allie in their long term care. RN CM transferred to [place] and left .    1636-Per Allie with [facility] term care, they cannot take pt due to his medical complexity. Pt's daughter has tried getting him placed there. They are very familiar with him and don't feel they can properly care for him.

## 2025-02-24 NOTE — CARE PLAN
Problem: Knowledge Deficit - Standard  Goal: Patient and family/care givers will demonstrate understanding of plan of care, disease process/condition, diagnostic tests and medications  Outcome: Progressing  Note: Safety, titrate o2 needs     Problem: Fall Risk  Goal: Patient will remain free from falls  Outcome: Progressing  Note: Bed alarm on, call light within reach   The patient is Watcher - Medium risk of patient condition declining or worsening    Shift Goals  Clinical Goals: safety, maintain adequate oxygenation  Patient Goals: rest, snacks  Family Goals: n/a none present    Progress made toward(s) clinical / shift goals:  safety, dc plans    Patient is not progressing towards the following goals:

## 2025-02-24 NOTE — PROGRESS NOTES
Received in bed, aaox4, blind able to make needs known, soft touch call light within reach, hungry, POC discussed needs attended.

## 2025-02-24 NOTE — CARE PLAN
The patient is Stable - Low risk of patient condition declining or worsening    Shift Goals  Clinical Goals: safety, maintain adequate oxygenation  Patient Goals: rest, snacks  Family Goals: n/a none present    Progress made toward(s) clinical / shift goals:  pt is Aox4; maintained on 2L via nasal cannula; desat to 83% while on room air; instructed pt not to removed O2 cannula; plan of care reviewed; needs attended; special call light provided to pt and within easy reach. Bed alarm is on.  Problem: Knowledge Deficit - Standard  Goal: Patient and family/care givers will demonstrate understanding of plan of care, disease process/condition, diagnostic tests and medications  Description: Target End Date:  1-3 days or as soon as patient condition allows    Document in Patient Education    1.  Patient and family/caregiver oriented to unit, equipment, visitation policy and means for communicating concern  2.  Complete/review Learning Assessment  3.  Assess knowledge level of disease process/condition, treatment plan, diagnostic tests and medications  4.  Explain disease process/condition, treatment plan, diagnostic tests and medications  Outcome: Progressing       Patient is not progressing towards the following goals:

## 2025-02-24 NOTE — PROGRESS NOTES
4 Eyes Skin Assessment Completed by Marjan RN and HERMANN Funez.    Head WDL  Ears - bilateral DTI,redness  Nose WDL  Mouth WDL  Neck - redness blanching  Breast/Chest WDL  Shoulder Blades WDL  Spine WDL  (R) Arm/Elbow/Hand WDL  (L) Arm/Elbow/Hand WDL  Abdomen WDL  Groin WDL  Scrotum/Coccyx/Buttocks WDL  (R) Leg WDL  (L) Leg WDL  (R) Heel/Foot/Toe Redness and Blanching  (L) Heel/Foot/Toe Redness and Blanching          Devices In Places - nasal cannula      Interventions In Place Gray Ear Foams, Pillows, Elbow Mepilex, and Q2 Turns    Possible Skin Injury Yes    Pictures Uploaded Into Epic Yes  Wound Consult Placed - already following  RN Wound Prevention Protocol Ordered Yes

## 2025-02-25 PROCEDURE — 700111 HCHG RX REV CODE 636 W/ 250 OVERRIDE (IP): Performed by: HOSPITALIST

## 2025-02-25 PROCEDURE — 99232 SBSQ HOSP IP/OBS MODERATE 35: CPT | Performed by: STUDENT IN AN ORGANIZED HEALTH CARE EDUCATION/TRAINING PROGRAM

## 2025-02-25 PROCEDURE — 700102 HCHG RX REV CODE 250 W/ 637 OVERRIDE(OP): Performed by: HOSPITALIST

## 2025-02-25 PROCEDURE — A9270 NON-COVERED ITEM OR SERVICE: HCPCS | Performed by: HOSPITALIST

## 2025-02-25 PROCEDURE — 770006 HCHG ROOM/CARE - MED/SURG/GYN SEMI*

## 2025-02-25 RX ADMIN — FAMOTIDINE 20 MG: 20 TABLET, FILM COATED ORAL at 04:25

## 2025-02-25 RX ADMIN — GABAPENTIN 300 MG: 300 CAPSULE ORAL at 04:26

## 2025-02-25 RX ADMIN — FERROUS SULFATE TAB 325 MG (65 MG ELEMENTAL FE) 325 MG: 325 (65 FE) TAB at 04:26

## 2025-02-25 RX ADMIN — UMECLIDINIUM BROMIDE AND VILANTEROL TRIFENATATE 1 PUFF: 62.5; 25 POWDER RESPIRATORY (INHALATION) at 04:26

## 2025-02-25 RX ADMIN — OXYCODONE HYDROCHLORIDE 5 MG: 5 TABLET ORAL at 03:45

## 2025-02-25 RX ADMIN — PRAMIPEXOLE DIHYDROCHLORIDE 0.5 MG: 0.5 TABLET ORAL at 04:26

## 2025-02-25 RX ADMIN — SENNOSIDES AND DOCUSATE SODIUM 2 TABLET: 50; 8.6 TABLET ORAL at 16:58

## 2025-02-25 RX ADMIN — AMLODIPINE BESYLATE 10 MG: 10 TABLET ORAL at 04:26

## 2025-02-25 RX ADMIN — ATORVASTATIN CALCIUM 40 MG: 40 TABLET, FILM COATED ORAL at 04:26

## 2025-02-25 RX ADMIN — HEPARIN SODIUM 5000 UNITS: 5000 INJECTION, SOLUTION INTRAVENOUS; SUBCUTANEOUS at 21:00

## 2025-02-25 RX ADMIN — CLOPIDOGREL BISULFATE 75 MG: 75 TABLET ORAL at 04:26

## 2025-02-25 RX ADMIN — LEVOTHYROXINE SODIUM 150 MCG: 0.15 TABLET ORAL at 04:26

## 2025-02-25 RX ADMIN — HEPARIN SODIUM 5000 UNITS: 5000 INJECTION, SOLUTION INTRAVENOUS; SUBCUTANEOUS at 04:25

## 2025-02-25 RX ADMIN — ISOSORBIDE MONONITRATE 240 MG: 60 TABLET, EXTENDED RELEASE ORAL at 04:26

## 2025-02-25 RX ADMIN — HEPARIN SODIUM 5000 UNITS: 5000 INJECTION, SOLUTION INTRAVENOUS; SUBCUTANEOUS at 13:53

## 2025-02-25 ASSESSMENT — ENCOUNTER SYMPTOMS
ABDOMINAL PAIN: 0
NAUSEA: 0
VOMITING: 0
SHORTNESS OF BREATH: 1
FEVER: 0

## 2025-02-25 ASSESSMENT — PAIN DESCRIPTION - PAIN TYPE
TYPE: ACUTE PAIN

## 2025-02-25 NOTE — WOUND TEAM
Wound consult placed regarding left 1st toe. Chart and images reviewed. Pt was evaluated for ears, sacrum and heels on 2/20/25. Pt has normal discoloration to the left 1st toe. No advanced wound care needs identified. Wound consult completed.

## 2025-02-25 NOTE — PROGRESS NOTES
Patient was initially A&O x4, however, appears confused at this time, A&O x self, having urinary incontinence. Kept safety measures at all times. Condom cath. placed.

## 2025-02-25 NOTE — PROGRESS NOTES
Hospital Medicine Daily Progress Note    Date of Service  2/25/2025    Chief Complaint  Pb Peters is a 69 y.o. male admitted 2/20/2025 with worsening shortness of breath.    Hospital Course  Pb Peters is a 69-year-old male with PMHx hypothyroidism, gout, anxiety, depression, HTN, CVA, AAA, GERD, CKD, secondary para hypothyroidism.  Admitted from [place] for worsening shortness of breath.    Per history-patient required dialysis in the recent past.  He was weaned off dialysis.  He developed worsening shortness of breath.  He presented to the ER for further evaluation.  Creatinine 2.17 which is slightly increased from previous.  [place] [facility] [place] function[place]    Creatinine 2.17 on admission.  Patient continues to have good urine output.  Creatinine improved to 1.7 and 1.8 at time of discharge.  Electrolytes remained stable.  No indications for urgent dialysis at this time.  Patient will need to follow-up with outpatient nephrology moving forward.    CXR showing hypoinflation.  Patient continued to require 2 to 3 L/min supplemental O2.  He needed a new oxygen order completed.  This has been ordered and delivered prior to discharge.    Interval Problem Update  2/22: Overnight vitals notable for SBP ranging 135 through 168.  Patient remains on 2 L/min supplemental O2.  Creatinine improved to 1.82 from 2.26 yesterday.  Potassium 3.3; replaced.  Magnesium 1.8; replaced.  Patient is medically clear for discharge but is awaiting oxygen delivery.  Apparently, patient does not have electricity in his trailer to allow for concentrator delivery.    2/23: Vitals notable for SBP ranging 129 through 168.  Patient remains on 2 L/min supplemental O2.  Potassium 3.6; replaced.  Creatinine 1.71 from 1.82.  Per communication with patient's family-trailer is run off solar power.  This is not enough to power patient's oxygen concentrator.    2/24: Vitals notable for SBP ranging  130 through 173.  Patient remains on 2 L/min supplemental O2.  Weaning challenge unsuccessful yesterday.  Patient will need a baseline of 2 L/min supplemental O2 moving forward.  Working with case management to arrange electricity for concentrator at home.    2/25: Vital stable overnight.  -148.  Patient remains on 1 to 2 L/min supplemental O2.  Working with case management to establish discharge plan.  Patient does not have electricity capabilities on his trailer for concentrator. BMP ordered for tomorrow AM.     I have discussed this patient's plan of care and discharge plan at IDT rounds today with Case Management, Nursing, Nursing leadership, and other members of the IDT team.    Consultants/Specialty  None at this time    Code Status  Full Code    Disposition  Medically Cleared  I have placed the appropriate orders for post-discharge needs.    Review of Systems  Review of Systems   Constitutional:  Positive for malaise/fatigue. Negative for fever.   Respiratory:  Positive for shortness of breath.    Cardiovascular:  Negative for chest pain and leg swelling.   Gastrointestinal:  Negative for abdominal pain, nausea and vomiting.        Physical Exam  Temp:  [36.1 °C (97 °F)-36.9 °C (98.4 °F)] 36.6 °C (97.9 °F)  Pulse:  [73-80] 75  Resp:  [16-18] 16  BP: (136-148)/(68-79) 136/68  SpO2:  [93 %-97 %] 97 %    Physical Exam  Vitals and nursing note reviewed.   Constitutional:       General: He is not in acute distress.     Appearance: Normal appearance. He is ill-appearing.   Cardiovascular:      Rate and Rhythm: Normal rate and regular rhythm.   Pulmonary:      Effort: Pulmonary effort is normal. No respiratory distress.      Breath sounds: Normal breath sounds. No wheezing.   Musculoskeletal:         General: No swelling.   Skin:     General: Skin is warm and dry.      Coloration: Skin is pale.   Neurological:      Mental Status: He is alert and oriented to person, place, and time. Mental status is at  baseline.   Psychiatric:         Mood and Affect: Mood normal.         Behavior: Behavior normal.         Fluids    Intake/Output Summary (Last 24 hours) at 2/25/2025 0930  Last data filed at 2/25/2025 0447  Gross per 24 hour   Intake 730 ml   Output 250 ml   Net 480 ml        Laboratory        Recent Labs     02/23/25  0431 02/24/25  0201   SODIUM 135 136   POTASSIUM 3.6 3.7   CHLORIDE 93* 94*   CO2 31 34*   GLUCOSE 105* 112*   BUN 32* 26*   CREATININE 1.71* 1.55*   CALCIUM 9.1 9.4                   Imaging  DX-OUTSIDE IMAGES-DX CHEST   Final Result      DX-CHEST-PORTABLE (1 VIEW)   Final Result      1.  Improving small right pleural effusion and adjacent right lower lung airspace disease.   2.  Improving left lower lung airspace disease.           Assessment/Plan  * Chronic bronchitis (HCC)- (present on admission)  Assessment & Plan  Patient former smoker  Continue RT per protocol  Patient is on 3 L of oxygen at baseline  - Home O2 ordered today; difficult discharge planning due to solar powered trailer. Does not provide enough electricity to power concentrator     Hypokalemia  Assessment & Plan  Potassium 3.6  - Repeat BMP in a.m.    Former smoker  Assessment & Plan  Needs to keep without smoking    Advance care planning- (present on admission)  Assessment & Plan  I discussed with patient regarding advance care planning, we discussed regarding CODE STATUS, we discussed regarding CPR intubation mechanical ventilation, discussed regarding side effects/complications, discussed regarding benefits of these 2 procedures, patient had time to ask questions, all questions were answered, spent 16 minutes.  After discussion patient would like his CODE STATUS to be full code.    (HFpEF) heart failure with preserved ejection fraction (HCC)- (present on admission)  Assessment & Plan  Echocardiogram: EF 65%; RV function normal  Euvolemic    Acute on chronic renal failure (HCC)- (present on admission)  Assessment &  Plan  Secondary to severe renal artery stenosis  Requiring HD during last hospitalization early February 2025  New baseline creatinine 1.5-2  Creatinine today 1.82 --> 1.71  - Avoid nephrotoxins; renally dose medications  - Repeat BMP in AM      Hypertension, essential- (present on admission)  Assessment & Plan  -148  - Continue home amlodipine  - Continue home Imdur    Abdominal aortic aneurysm (AAA) without rupture (HCC)- (present on admission)  Assessment & Plan  Measuring 3.3 cm on CTA from 2016  Need follow-up as outpatient         VTE prophylaxis: VTE Selection    I have performed a physical exam and reviewed and updated ROS and Plan today (2/25/2025). In review of yesterday's note (2/24/2025), there are no changes except as documented above.

## 2025-02-25 NOTE — DISCHARGE PLANNING
Case Management Discharge Planning    Admission Date: 2/20/2025  GMLOS: 3  ALOS: 5    6-Clicks ADL Score: 24  6-Clicks Mobility Score: 19      Anticipated Discharge Dispo: Discharge Disposition: Discharged to home/self care (01)  Discharge Address: [address] [facility] [address]    DME Needed: Yes    DME Ordered: No    Action(s) Taken: Patient and his daughter are reporting that they cannot move her RV because it is broken down and they are not able to move his trailer.  She has been using his money to pay for all the medical issues he has had for the last few months and paying back loans. She says she would not know where to go if they were even able to move his trailer where they can get power. She also is stating that even if they got a working generator, it is hard to keep up with costs of maintaining it to run continuously.   Confirmed with Misa at [facility] in [place] that if pt lived with a power source, they would still be able to service him with oxygen, but they can no longer keep giving him tanks to use 24/7.  Requested an updated walking oxygen eval.    1550-Devi, pt's daughter called RN VINNY and said she reached out to patient's PCP again and they are unable to find placement for patient like they had hoped. Devi says she is currently working on trying to get a safe discharge plan, unclear of what the plan is that she is working on yet.     Escalations Completed: Leadership    Medically Clear: Yes    Next Steps: F/U for updated oxygen eval    Barriers to Discharge: safe discharge plan with oxygen    Is the patient up for discharge tomorrow: No

## 2025-02-25 NOTE — CARE PLAN
The patient is Stable - Low risk of patient condition declining or worsening    Shift Goals  Clinical Goals: oxygen will remain >91%, pt will be agreeable to sit in chiar  Patient Goals: snacks, rest  Family Goals: n/a none present    Progress made toward(s) clinical / shift goals:    Problem: Pain - Standard  Goal: Alleviation of pain or a reduction in pain to the patient’s comfort goal  Description: Target End Date:  Prior to discharge or change in level of care    Document on Vitals flowsheet    1.  Document pain using the appropriate pain scale per order or unit policy  2.  Educate and implement non-pharmacologic comfort measures (i.e. relaxation, distraction, massage, cold/heat therapy, etc.)  3.  Pain management medications as ordered  4.  Reassess pain after pain med administration per policy  5.  If opiods administered assess patient's response to pain medication is appropriate per POSS sedation scale  6.  Follow pain management plan developed in collaboration with patient and interdisciplinary team (including palliative care or pain specialists if applicable)  Outcome: Progressing     Problem: Skin Integrity  Goal: Skin integrity is maintained or improved  Description: Target End Date:  Prior to discharge or change in level of care    Document interventions on Skin Risk/Mauricio flowsheet groups and corresponding LDA    1.  Assess and monitor skin integrity, appearance and/or temperature  2.  Assess risk factors for impaired skin integrity and/or pressures ulcers  3.  Implement precautions to protect skin integrity in collaboration with interdisciplinary team  4.  Implement pressure ulcer prevention protocol if at risk for skin breakdown  5.  Confirm wound care consult if at risk for skin breakdown  6.  Ensure patient use of pressure relieving devices  (Low air loss bed, waffle overlay, heel protectors, ROHO cushion, etc)  Outcome: Progressing   Able to sit in chair for lunch today. Alert and oriented,  forgetful at times.     Patient is not progressing towards the following goals:

## 2025-02-25 NOTE — CARE PLAN
The patient is Watcher - Medium risk of patient condition declining or worsening    Shift Goals  Clinical Goals: O2 sat. >90%, monitor labs/kidney fxn  Patient Goals: snacks  Family Goals: none present    Progress made toward(s) clinical / shift goals: Decreased O2 at 1lpm, O2 sat. at 92%. On  and moderate high back rest. No sx/sy of respiratory distress. Recent BUN (26) and creatinine (1.55) were elevated.      Patient is not progressing towards the following goals: n/a

## 2025-02-26 ENCOUNTER — PATIENT OUTREACH (OUTPATIENT)
Dept: HEALTH INFORMATION MANAGEMENT | Facility: OTHER | Age: 70
End: 2025-02-26
Payer: MEDICARE

## 2025-02-26 PROCEDURE — 99232 SBSQ HOSP IP/OBS MODERATE 35: CPT | Performed by: STUDENT IN AN ORGANIZED HEALTH CARE EDUCATION/TRAINING PROGRAM

## 2025-02-26 PROCEDURE — 770006 HCHG ROOM/CARE - MED/SURG/GYN SEMI*

## 2025-02-26 PROCEDURE — 700102 HCHG RX REV CODE 250 W/ 637 OVERRIDE(OP): Performed by: HOSPITALIST

## 2025-02-26 PROCEDURE — 700111 HCHG RX REV CODE 636 W/ 250 OVERRIDE (IP): Performed by: HOSPITALIST

## 2025-02-26 PROCEDURE — A9270 NON-COVERED ITEM OR SERVICE: HCPCS | Performed by: HOSPITALIST

## 2025-02-26 RX ADMIN — AMLODIPINE BESYLATE 10 MG: 10 TABLET ORAL at 05:13

## 2025-02-26 RX ADMIN — OXYCODONE HYDROCHLORIDE 5 MG: 5 TABLET ORAL at 05:14

## 2025-02-26 RX ADMIN — OXYCODONE HYDROCHLORIDE 5 MG: 5 TABLET ORAL at 14:51

## 2025-02-26 RX ADMIN — HEPARIN SODIUM 5000 UNITS: 5000 INJECTION, SOLUTION INTRAVENOUS; SUBCUTANEOUS at 14:25

## 2025-02-26 RX ADMIN — FERROUS SULFATE TAB 325 MG (65 MG ELEMENTAL FE) 325 MG: 325 (65 FE) TAB at 05:06

## 2025-02-26 RX ADMIN — FAMOTIDINE 20 MG: 20 TABLET, FILM COATED ORAL at 05:06

## 2025-02-26 RX ADMIN — ISOSORBIDE MONONITRATE 240 MG: 60 TABLET, EXTENDED RELEASE ORAL at 05:06

## 2025-02-26 RX ADMIN — SENNOSIDES AND DOCUSATE SODIUM 2 TABLET: 50; 8.6 TABLET ORAL at 17:16

## 2025-02-26 RX ADMIN — LEVOTHYROXINE SODIUM 150 MCG: 0.15 TABLET ORAL at 05:06

## 2025-02-26 RX ADMIN — UMECLIDINIUM BROMIDE AND VILANTEROL TRIFENATATE 1 PUFF: 62.5; 25 POWDER RESPIRATORY (INHALATION) at 05:07

## 2025-02-26 RX ADMIN — HEPARIN SODIUM 5000 UNITS: 5000 INJECTION, SOLUTION INTRAVENOUS; SUBCUTANEOUS at 05:06

## 2025-02-26 RX ADMIN — HEPARIN SODIUM 5000 UNITS: 5000 INJECTION, SOLUTION INTRAVENOUS; SUBCUTANEOUS at 22:07

## 2025-02-26 RX ADMIN — ATORVASTATIN CALCIUM 40 MG: 40 TABLET, FILM COATED ORAL at 05:06

## 2025-02-26 RX ADMIN — PRAMIPEXOLE DIHYDROCHLORIDE 0.5 MG: 0.5 TABLET ORAL at 05:06

## 2025-02-26 RX ADMIN — CLOPIDOGREL BISULFATE 75 MG: 75 TABLET ORAL at 05:06

## 2025-02-26 RX ADMIN — GABAPENTIN 300 MG: 300 CAPSULE ORAL at 05:06

## 2025-02-26 ASSESSMENT — PAIN DESCRIPTION - PAIN TYPE
TYPE: ACUTE PAIN

## 2025-02-26 ASSESSMENT — ENCOUNTER SYMPTOMS
NAUSEA: 0
FEVER: 0
SHORTNESS OF BREATH: 1
VOMITING: 0
ABDOMINAL PAIN: 0

## 2025-02-26 NOTE — DISCHARGE PLANNING
GLORIA Desai called Devi ( daughter - HIPAA approved contact) and introduced Community Care Management and reviewed pt's current living situation.   Pt lives off grid in a 5th wheel trailer, daughter and spouse live in a RV by pt, both units are not moveable. Daughter and spouse are not employed and rely on Pt's income to buy food, gas, propane and property taxes/ bills. Closest location to pt is [facility] for food etc. Pt receives approximally $1700.00 a month SS benefits.   Daughter states she will be going to look at a trailer located in [place] today that is available to rent and has power.   Will follow up this afternoon with Devi .  4:00 pm.  See note from Sujatha MCCRARY at 3:42 pm.

## 2025-02-26 NOTE — PROGRESS NOTES
Hospital Medicine Daily Progress Note    Date of Service  2/26/2025    Chief Complaint  Pb Peters is a 69 y.o. male admitted 2/20/2025 with worsening shortness of breath.    Hospital Course  Pb Peters is a 69-year-old male with PMHx hypothyroidism, gout, anxiety, depression, HTN, CVA, AAA, GERD, CKD, secondary para hypothyroidism.  Admitted from [facility] for worsening shortness of breath.    Per history-patient required dialysis in the recent past.  He was weaned off dialysis.  He developed worsening shortness of breath.  He presented to the ER for further evaluation.  Creatinine 2.17 which is slightly increased from previous.  [place] [facility] [place] function.    Creatinine 2.17 on admission.  Patient continues to have good urine output.  Creatinine improved to 1.7 and 1.8 at time of discharge.  Electrolytes remained stable.  No indications for urgent dialysis at this time.  Patient will need to follow-up with outpatient nephrology moving forward.    CXR showing hypoinflation.  Patient continued to require 2 to 3 L/min supplemental O2.  He needed a new oxygen order completed.  This has been ordered and delivered prior to discharge.    Interval Problem Update  2/22: Overnight vitals notable for SBP ranging 135 through 168.  Patient remains on 2 L/min supplemental O2.  Creatinine improved to 1.82 from 2.26 yesterday.  Potassium 3.3; replaced.  Magnesium 1.8; replaced.  Patient is medically clear for discharge but is awaiting oxygen delivery.  Apparently, patient does not have electricity in his trailer to allow for concentrator delivery.    2/23: Vitals notable for SBP ranging 129 through 168.  Patient remains on 2 L/min supplemental O2.  Potassium 3.6; replaced.  Creatinine 1.71 from 1.82.  Per communication with patient's family-trailer is run off solar power.  This is not enough to power patient's oxygen concentrator.    2/24: Vitals notable for SBP ranging  130 through 173.  Patient remains on 2 L/min supplemental O2.  Weaning challenge unsuccessful yesterday.  Patient will need a baseline of 2 L/min supplemental O2 moving forward.  Working with case management to arrange electricity for concentrator at home.    2/25: Vital stable overnight.  -148.  Patient remains on 1 to 2 L/min supplemental O2.  Working with case management to establish discharge plan.  Patient does not have electricity capabilities on his trailer for concentrator. BMP ordered for tomorrow AM.     2/26: Vitals remained stable overnight.   through 165.  Patient remains on 1 to 2 L/min supplemental O2.  Long conversation with patient regarding getting his concentrator set up at home.  For now patient's daughter is working to move him into a trailer that has electricity.  We are waiting to hear the outcome of that planning.    I have discussed this patient's plan of care and discharge plan at IDT rounds today with Case Management, Nursing, Nursing leadership, and other members of the IDT team.    Consultants/Specialty  None at this time    Code Status  Full Code    Disposition  The patient is medically cleared for discharge to home or a post-acute facility.  Anticipate discharge to: home with close outpatient follow-up    I have placed the appropriate orders for post-discharge needs.    Review of Systems  Review of Systems   Constitutional:  Positive for malaise/fatigue. Negative for fever.   Respiratory:  Positive for shortness of breath.    Cardiovascular:  Negative for chest pain and leg swelling.   Gastrointestinal:  Negative for abdominal pain, nausea and vomiting.        Physical Exam  Temp:  [19.8 °C (67.7 °F)-36.5 °C (97.7 °F)] 19.8 °C (67.7 °F)  Pulse:  [75-83] 78  Resp:  [17-18] 18  BP: (128-165)/(69-88) 128/69  SpO2:  [92 %-96 %] 92 %    Physical Exam  Vitals and nursing note reviewed.   Constitutional:       General: He is not in acute distress.     Appearance: Normal  appearance. He is ill-appearing.   Cardiovascular:      Rate and Rhythm: Normal rate and regular rhythm.   Pulmonary:      Effort: Pulmonary effort is normal. No respiratory distress.      Breath sounds: Normal breath sounds. No wheezing.   Musculoskeletal:         General: No swelling.   Skin:     General: Skin is warm and dry.      Coloration: Skin is pale.   Neurological:      Mental Status: He is alert and oriented to person, place, and time. Mental status is at baseline.   Psychiatric:         Mood and Affect: Mood normal.         Behavior: Behavior normal.         Fluids    Intake/Output Summary (Last 24 hours) at 2/26/2025 1224  Last data filed at 2/26/2025 0519  Gross per 24 hour   Intake 300 ml   Output 900 ml   Net -600 ml        Laboratory        Recent Labs     02/24/25  0201   SODIUM 136   POTASSIUM 3.7   CHLORIDE 94*   CO2 34*   GLUCOSE 112*   BUN 26*   CREATININE 1.55*   CALCIUM 9.4                   Imaging  DX-OUTSIDE IMAGES-DX CHEST   Final Result      DX-CHEST-PORTABLE (1 VIEW)   Final Result      1.  Improving small right pleural effusion and adjacent right lower lung airspace disease.   2.  Improving left lower lung airspace disease.           Assessment/Plan  * Chronic bronchitis (HCC)- (present on admission)  Assessment & Plan  Patient former smoker  Continue RT per protocol  Patient is on 3 L of oxygen at baseline  - Home O2 ordered today; difficult discharge planning due to solar powered trailer. Does not provide enough electricity to power concentrator     Hypokalemia  Assessment & Plan  Potassium 3.6  - Repeat BMP in a.m.    Former smoker  Assessment & Plan  Needs to keep without smoking    Advance care planning- (present on admission)  Assessment & Plan  I discussed with patient regarding advance care planning, we discussed regarding CODE STATUS, we discussed regarding CPR intubation mechanical ventilation, discussed regarding side effects/complications, discussed regarding benefits of  these 2 procedures, patient had time to ask questions, all questions were answered, spent 16 minutes.  After discussion patient would like his CODE STATUS to be full code.    (HFpEF) heart failure with preserved ejection fraction (HCC)- (present on admission)  Assessment & Plan  Echocardiogram: EF 65%; RV function normal  Euvolemic    Acute on chronic renal failure (HCC)- (present on admission)  Assessment & Plan  Secondary to severe renal artery stenosis  Requiring HD during last hospitalization early February 2025  New baseline creatinine 1.5-2  Creatinine today 1.82 --> 1.71  - Avoid nephrotoxins; renally dose medications  - Repeat BMP in AM      Hypertension, essential- (present on admission)  Assessment & Plan  -148  - Continue home amlodipine  - Continue home Imdur    Abdominal aortic aneurysm (AAA) without rupture (HCC)- (present on admission)  Assessment & Plan  Measuring 3.3 cm on CTA from 2016  Need follow-up as outpatient         VTE prophylaxis:   SCDs/TEDs   heparin ppx      I have performed a physical exam and reviewed and updated ROS and Plan today (2/26/2025). In review of yesterday's note (2/25/2025), there are no changes except as documented above.

## 2025-02-26 NOTE — CARE PLAN
The patient is Watcher - Medium risk of patient condition declining or worsening    Shift Goals  Clinical Goals: Adequate oxygenation  Patient Goals: snacks  Family Goals: none present    Progress made toward(s) clinical / shift goals: Patient O2 sat. 92-93% at 1lpm, baseline 3lpm, diminished lung sounds. Repositioned on moderate high back rest. No sx/sy of respiratory distress, maintained on .     Patient is not progressing towards the following goals: n/a

## 2025-02-26 NOTE — DISCHARGE PLANNING
Case Management Discharge Planning    Admission Date: 2/20/2025  GMLOS: 3  ALOS: 6    6-Clicks ADL Score: 24  6-Clicks Mobility Score: 19      Anticipated Discharge Dispo: Discharge Disposition: Discharged to home/self care (01)  Discharge Address: [address] [facility] [address]      Action(s) Taken: Received call from Devi, pt's daughter, stating that they found a trailer for pt to move into Friday that will have power and water. She will get it cleaned and power and water turned on by Friday. Address is [address] [facility], [address]. RN CM to f/u with Devi tomorrow to confirm it will be ready for pt and discuss transport home.  HERMANN CASILLAS tried calling [facility] in [place] to update them of new address to confirm they can continue providing pt oxygen, but no answer. Will attempt again tomorrow.      Medically Clear: Yes    Next Steps: F/U with Al to verify they can service pt, f/u wit daughter to confirm power will be on for pt Friday.    Barriers to Discharge: Daughter getting new home ready for pt to discharge to with power for oxygen    Is the patient up for discharge tomorrow: No

## 2025-02-27 PROCEDURE — 99232 SBSQ HOSP IP/OBS MODERATE 35: CPT | Performed by: STUDENT IN AN ORGANIZED HEALTH CARE EDUCATION/TRAINING PROGRAM

## 2025-02-27 PROCEDURE — 700111 HCHG RX REV CODE 636 W/ 250 OVERRIDE (IP): Performed by: HOSPITALIST

## 2025-02-27 PROCEDURE — 700102 HCHG RX REV CODE 250 W/ 637 OVERRIDE(OP): Performed by: HOSPITALIST

## 2025-02-27 PROCEDURE — 770006 HCHG ROOM/CARE - MED/SURG/GYN SEMI*

## 2025-02-27 PROCEDURE — A9270 NON-COVERED ITEM OR SERVICE: HCPCS | Performed by: HOSPITALIST

## 2025-02-27 PROCEDURE — 92526 ORAL FUNCTION THERAPY: CPT

## 2025-02-27 RX ADMIN — SENNOSIDES AND DOCUSATE SODIUM 2 TABLET: 50; 8.6 TABLET ORAL at 16:25

## 2025-02-27 RX ADMIN — GABAPENTIN 300 MG: 300 CAPSULE ORAL at 04:58

## 2025-02-27 RX ADMIN — HEPARIN SODIUM 5000 UNITS: 5000 INJECTION, SOLUTION INTRAVENOUS; SUBCUTANEOUS at 13:39

## 2025-02-27 RX ADMIN — HEPARIN SODIUM 5000 UNITS: 5000 INJECTION, SOLUTION INTRAVENOUS; SUBCUTANEOUS at 20:18

## 2025-02-27 RX ADMIN — CLOPIDOGREL BISULFATE 75 MG: 75 TABLET ORAL at 04:59

## 2025-02-27 RX ADMIN — OXYCODONE HYDROCHLORIDE 5 MG: 5 TABLET ORAL at 06:03

## 2025-02-27 RX ADMIN — LEVOTHYROXINE SODIUM 150 MCG: 0.15 TABLET ORAL at 04:59

## 2025-02-27 RX ADMIN — FERROUS SULFATE TAB 325 MG (65 MG ELEMENTAL FE) 325 MG: 325 (65 FE) TAB at 04:58

## 2025-02-27 RX ADMIN — AMLODIPINE BESYLATE 10 MG: 10 TABLET ORAL at 04:58

## 2025-02-27 RX ADMIN — ATORVASTATIN CALCIUM 40 MG: 40 TABLET, FILM COATED ORAL at 04:59

## 2025-02-27 RX ADMIN — PRAMIPEXOLE DIHYDROCHLORIDE 0.5 MG: 0.5 TABLET ORAL at 04:58

## 2025-02-27 RX ADMIN — HEPARIN SODIUM 5000 UNITS: 5000 INJECTION, SOLUTION INTRAVENOUS; SUBCUTANEOUS at 05:00

## 2025-02-27 RX ADMIN — FAMOTIDINE 20 MG: 20 TABLET, FILM COATED ORAL at 04:58

## 2025-02-27 RX ADMIN — ISOSORBIDE MONONITRATE 240 MG: 60 TABLET, EXTENDED RELEASE ORAL at 04:58

## 2025-02-27 RX ADMIN — UMECLIDINIUM BROMIDE AND VILANTEROL TRIFENATATE 1 PUFF: 62.5; 25 POWDER RESPIRATORY (INHALATION) at 04:57

## 2025-02-27 ASSESSMENT — ENCOUNTER SYMPTOMS
ABDOMINAL PAIN: 0
FEVER: 0
NAUSEA: 0
VOMITING: 0
SHORTNESS OF BREATH: 0

## 2025-02-27 ASSESSMENT — PAIN DESCRIPTION - PAIN TYPE: TYPE: ACUTE PAIN

## 2025-02-27 NOTE — DISCHARGE PLANNING
Case Management Discharge Planning    Admission Date: 2/20/2025  GMLOS: 3  ALOS: 7    6-Clicks ADL Score: 24  6-Clicks Mobility Score: 19      Anticipated Discharge Dispo: Discharge Disposition: Discharged to home/self care (01)  [facility] [address] [facility] [address]      Action(s) Taken: Spoke with Misa at [facility] [place] and gave her pt's new address. She has updated it in chart and says pt should still have his concentrator and his account is current. They are happy to continue service at his new residence with power. If tanks are needed for ride home, the [facility] can deliver to bedside.    Escalations Completed: None    Medically Clear: Yes    Next Steps: RN to reach out Devi today to confirm power is on and arrange transport to new [facility].    Barriers to Discharge: Power getting turned on at new [place]    1345-Spoke with Devi and Al is delivering a new concentrator as his is not working. Devi will call me back this afternoon once she confirms that it is hooked up and working. RN CM got approval from leadership for w/c transport home tomorrow. Ride line order placed and [facility] faxed.    3954-Devi called and reports that a new concentrator was delivered to [facility] and it is hooked up and working. Informed her that transport time still pending but will update her once known.

## 2025-02-27 NOTE — CARE PLAN
The patient is Watcher - Medium risk of patient condition declining or worsening    Shift Goals  Clinical Goals: q2 turn, no further skin breakdown, safety  Patient Goals: food and sleep  Family Goals: none present    Progress made toward(s) clinical / shift goals: On low air-loss bed, kept q2 turn, heel and sacral mepilex in place, and barrier cream applied. Side rails up, hourly rounding, bed alarm on, bed wheels locked and low position. No occurrence of fall/injury. No further skin breakdown noted.    Patient is not progressing towards the following goals:

## 2025-02-27 NOTE — PROGRESS NOTES
Hospital Medicine Daily Progress Note    Date of Service  2/27/2025    Chief Complaint  Pb Peters is a 69 y.o. male admitted 2/20/2025 with worsening shortness of breath.    Hospital Course  Pb Peters is a 69-year-old male with PMHx hypothyroidism, gout, anxiety, depression, HTN, CVA, AAA, GERD, CKD, secondary para hypothyroidism.  Admitted from [place] for worsening shortness of breath.    Per history-patient required dialysis in the recent past.  He was weaned off dialysis.  He developed worsening shortness of breath.  He presented to the ER for further evaluation.  Creatinine 2.17 which is slightly increased from previous.  [place] [facility] [place] of worsening kidney function.    Creatinine 2.17 on admission.  Patient continues to have good urine output.  Creatinine improved to 1.7 and 1.8 at time of discharge.  Electrolytes remained stable.  No indications for urgent dialysis at this time.  Patient will need to follow-up with outpatient nephrology moving forward.    CXR showing hypoinflation.  Patient continued to require 2 to 3 L/min supplemental O2.  He needed a new oxygen order completed.  This has been ordered and delivered prior to discharge.    Interval Problem Update  2/22: Overnight vitals notable for SBP ranging 135 through 168.  Patient remains on 2 L/min supplemental O2.  Creatinine improved to 1.82 from 2.26 yesterday.  Potassium 3.3; replaced.  Magnesium 1.8; replaced.  Patient is medically clear for discharge but is awaiting oxygen delivery.  Apparently, patient does not have electricity in his trailer to allow for concentrator delivery.    2/23: Vitals notable for SBP ranging 129 through 168.  Patient remains on 2 L/min supplemental O2.  Potassium 3.6; replaced.  Creatinine 1.71 from 1.82.  Per communication with patient's family-trailer is run off solar power.  This is not enough to power patient's oxygen concentrator.    2/24: Vitals notable for SBP ranging  130 through 173.  Patient remains on 2 L/min supplemental O2.  Weaning challenge unsuccessful yesterday.  Patient will need a baseline of 2 L/min supplemental O2 moving forward.  Working with case management to arrange electricity for concentrator at home.    2/25: Vital stable overnight.  -148.  Patient remains on 1 to 2 L/min supplemental O2.  Working with case management to establish discharge plan.  Patient does not have electricity capabilities on his trailer for concentrator. BMP ordered for tomorrow AM.     2/26: Vitals remained stable overnight.   through 165.  Patient remains on 1 to 2 L/min supplemental O2.  Long conversation with patient regarding getting his concentrator set up at home.  For now patient's daughter is working to move him into a trailer that has electricity.  We are waiting to hear the outcome of that planning.    2/27: Vitals notable for SBP ranging 137 through 191 overnight.   Anticipate discharge tomorrow morning once oxygen concentrator and electricity have been established at patient's trailer.  Discussed at length with case management and patient at bedside.    I have discussed this patient's plan of care and discharge plan at IDT rounds today with Case Management, Nursing, Nursing leadership, and other members of the IDT team.    Consultants/Specialty  None at this time    Code Status  Full Code    Disposition  The patient is medically cleared for discharge to home or a post-acute facility.      I have placed the appropriate orders for post-discharge needs.    Review of Systems  Review of Systems   Constitutional:  Positive for malaise/fatigue. Negative for fever.   Respiratory:  Negative for shortness of breath.    Cardiovascular:  Negative for chest pain and leg swelling.   Gastrointestinal:  Negative for abdominal pain, nausea and vomiting.        Physical Exam  Temp:  [36.1 °C (97 °F)-37.1 °C (98.7 °F)] 36.6 °C (97.8 °F)  Pulse:  [80-95] 80  Resp:  [16-19] 18  BP:  (137-191)/(74-96) 138/76  SpO2:  [93 %-95 %] 94 %    Physical Exam  Vitals and nursing note reviewed.   Constitutional:       General: He is not in acute distress.     Appearance: Normal appearance. He is ill-appearing.   Cardiovascular:      Rate and Rhythm: Normal rate and regular rhythm.   Pulmonary:      Effort: Pulmonary effort is normal. No respiratory distress.      Breath sounds: Normal breath sounds. No wheezing.   Musculoskeletal:         General: No swelling.   Skin:     General: Skin is warm and dry.      Coloration: Skin is pale.   Neurological:      Mental Status: He is alert and oriented to person, place, and time. Mental status is at baseline.   Psychiatric:         Mood and Affect: Mood normal.         Behavior: Behavior normal.         Fluids    Intake/Output Summary (Last 24 hours) at 2/27/2025 1313  Last data filed at 2/27/2025 0833  Gross per 24 hour   Intake 200 ml   Output 600 ml   Net -400 ml        Laboratory                            Imaging  DX-OUTSIDE IMAGES-DX CHEST   Final Result      DX-CHEST-PORTABLE (1 VIEW)   Final Result      1.  Improving small right pleural effusion and adjacent right lower lung airspace disease.   2.  Improving left lower lung airspace disease.           Assessment/Plan  * Chronic bronchitis (HCC)- (present on admission)  Assessment & Plan  Patient former smoker  Continue RT per protocol  Patient is on 3 L of oxygen at baseline  - Home O2 ordered today; difficult discharge planning due to solar powered trailer. Does not provide enough electricity to power concentrator     Hypokalemia  Assessment & Plan  Potassium 3.6    Former smoker  Assessment & Plan  Needs to keep without smoking    Advance care planning- (present on admission)  Assessment & Plan  I discussed with patient regarding advance care planning, we discussed regarding CODE STATUS, we discussed regarding CPR intubation mechanical ventilation, discussed regarding side effects/complications, discussed  regarding benefits of these 2 procedures, patient had time to ask questions, all questions were answered, spent 16 minutes.  After discussion patient would like his CODE STATUS to be full code.    (HFpEF) heart failure with preserved ejection fraction (HCC)- (present on admission)  Assessment & Plan  Echocardiogram: EF 65%; RV function normal  Euvolemic    Acute on chronic renal failure (HCC)- (present on admission)  Assessment & Plan  Secondary to severe renal artery stenosis  Requiring HD during last hospitalization early February 2025  New baseline creatinine 1.5-2  Creatinine today 1.82 --> 1.71  - Avoid nephrotoxins; renally dose medications  - Repeat BMP in AM      Hypertension, essential- (present on admission)  Assessment & Plan  -148  - Continue home amlodipine  - Continue home Imdur    Abdominal aortic aneurysm (AAA) without rupture (HCC)- (present on admission)  Assessment & Plan  Measuring 3.3 cm on CTA from 2016  Need follow-up as outpatient         VTE prophylaxis:   SCDs/TEDs   heparin ppx      I have performed a physical exam and reviewed and updated ROS and Plan today (2/27/2025). In review of yesterday's note (2/26/2025), there are no changes except as documented above.

## 2025-02-27 NOTE — CARE PLAN
The patient is Stable - Low risk of patient condition declining or worsening    Shift Goals  Clinical Goals: pain management  Patient Goals: rest, comfort  Family Goals: silvia    Progress made toward(s) clinical / shift goals:  Pt is a&o x2-3. O2 sats maintained on 3L. IV patent and saline locked. No reports of pain at this time. Bed alarm on. All needs currently addressed. Q2hr turns in place.      Patient is not progressing towards the following goals:

## 2025-02-27 NOTE — THERAPY
Speech Language Pathology   Daily Treatment     Patient Name: Pb Peters  AGE:  69 y.o., SEX:  male  Medical Record #: [id number]  Date of Service: 2/27/2025      Precautions:  Precautions: Fall Risk, Swallow Precautions     Subjective  RN cleared patient for dysphagia tx session. Patient sleeping, but roused easily to verbal cues. Patient on regular/thins (finger foods d/t blindness) and per RN, appears to be tolerating diet without difficulty.     Assessment  Patient consumed PO trials of dry solids and thin liquids via straw. Patient consumed PO trials with appropriate bolus acceptance and sufficient containment. Mastication was timely with complete oral clearance achieved. Immediate coughing noted with large straw sip of thins x1, but once patient was cued and externally paced for small straw sips, no further s/sx of aspiration were noted.     Clinical Impressions  Patient presents with grossly functional oropharyngeal swallow to continue regular diet w/ thin liquids (finger foods). Patient needs assistance with feeding d/t blindness and impulsivity. Please pace for small straw sips of thins. SLP will not actively follow. Please re-consult if needed.     Recommendations  Treatment Completed: Dysphagia Treatment     Dysphagia Treatment  Diet Consistency: Regular/thins - finger foods  Instrumentation: None indicated at this time  Medication: As tolerated  Supervision: Assist with meal tray set up (1:1 feeding d/t blindness)  Positioning: Fully upright and midline during oral intake, Meals sitting upright in a chair, as tolerated  Risk Management : Small bites/sips, Alternate bites and sips, Slow rate of intake, Reduce environmental distractions  Oral Care: BID    SLP Treatment Plan  Treatment Plan: None Indicated  SLP Frequency: N/A - Evaluation Only  Estimated Duration: N/A - Evaluation Only    Anticipated Discharge Needs  Discharge Recommendations: Anticipate that the patient will have no further  "speech therapy needs after discharge from the hospital  Therapy Recommendations Upon DC: Not Indicated    Patient / Family Goals  Patient / Family Goal #1: \"I just spilled my coffee all over myself\"  Goal #1 Outcome: Goal met  Short Term Goals  Short Term Goal # 1: Pt will consume least restrictive diet with no overt s/sx of aspiration or decline in pulmonary status  Goal Outcome # 1: Goal met    ESE Laguerre  "

## 2025-02-27 NOTE — DISCHARGE PLANNING
Case Management Discharge Planning    Admission Date: 2/20/2025  GMLOS: 3  ALOS: 7    6-Clicks ADL Score: 24  6-Clicks Mobility Score: 19      Anticipated Discharge Dispo: Discharge Disposition: Discharged to home/self care (01)  Discharge Address: [address] [place] [id number]    DME Needed: No    Action(s) Taken: HERMANN CASILLAS updated Devi that pt's transport is set for 0900 tomorrow. Approved services for $1527.49 sent to leadership. MD updated on dc time.    Escalations Completed: None    Medically Clear: Yes

## 2025-02-27 NOTE — DISCHARGE PLANNING
DC Transport Scheduled    Transport Company Scheduled:  [facility]    Scheduled Date: 2/27/2025  Scheduled Time: 0900    Transport Type: Wheelchair  Destination: home   Destination address: [address] [place] [address], [facility]    Notified care team of scheduled transport via Voalte.     If there are any changes needed to the DC transportation scheduled, please contact [facility] at ext. [phone] between the hours of [phone]. If outside those hours, contact the ED Case Manager at ext. [phone].

## 2025-02-28 VITALS
OXYGEN SATURATION: 92 % | BODY MASS INDEX: 22.93 KG/M2 | HEIGHT: 72 IN | TEMPERATURE: 98.4 F | SYSTOLIC BLOOD PRESSURE: 158 MMHG | RESPIRATION RATE: 19 BRPM | WEIGHT: 169.31 LBS | DIASTOLIC BLOOD PRESSURE: 84 MMHG | HEART RATE: 85 BPM

## 2025-02-28 PROCEDURE — A9270 NON-COVERED ITEM OR SERVICE: HCPCS | Performed by: HOSPITALIST

## 2025-02-28 PROCEDURE — 700111 HCHG RX REV CODE 636 W/ 250 OVERRIDE (IP): Performed by: HOSPITALIST

## 2025-02-28 PROCEDURE — 99239 HOSP IP/OBS DSCHRG MGMT >30: CPT | Performed by: STUDENT IN AN ORGANIZED HEALTH CARE EDUCATION/TRAINING PROGRAM

## 2025-02-28 PROCEDURE — 700102 HCHG RX REV CODE 250 W/ 637 OVERRIDE(OP): Performed by: HOSPITALIST

## 2025-02-28 RX ADMIN — OXYCODONE HYDROCHLORIDE 5 MG: 5 TABLET ORAL at 08:46

## 2025-02-28 RX ADMIN — CLOPIDOGREL BISULFATE 75 MG: 75 TABLET ORAL at 04:28

## 2025-02-28 RX ADMIN — ATORVASTATIN CALCIUM 40 MG: 40 TABLET, FILM COATED ORAL at 04:28

## 2025-02-28 RX ADMIN — HEPARIN SODIUM 5000 UNITS: 5000 INJECTION, SOLUTION INTRAVENOUS; SUBCUTANEOUS at 04:28

## 2025-02-28 RX ADMIN — PRAMIPEXOLE DIHYDROCHLORIDE 0.5 MG: 0.5 TABLET ORAL at 04:28

## 2025-02-28 RX ADMIN — FAMOTIDINE 20 MG: 20 TABLET, FILM COATED ORAL at 04:28

## 2025-02-28 RX ADMIN — LEVOTHYROXINE SODIUM 150 MCG: 0.15 TABLET ORAL at 04:28

## 2025-02-28 RX ADMIN — GABAPENTIN 300 MG: 300 CAPSULE ORAL at 04:28

## 2025-02-28 RX ADMIN — UMECLIDINIUM BROMIDE AND VILANTEROL TRIFENATATE 1 PUFF: 62.5; 25 POWDER RESPIRATORY (INHALATION) at 04:29

## 2025-02-28 RX ADMIN — ISOSORBIDE MONONITRATE 240 MG: 60 TABLET, EXTENDED RELEASE ORAL at 04:28

## 2025-02-28 RX ADMIN — AMLODIPINE BESYLATE 10 MG: 10 TABLET ORAL at 04:28

## 2025-02-28 RX ADMIN — FERROUS SULFATE TAB 325 MG (65 MG ELEMENTAL FE) 325 MG: 325 (65 FE) TAB at 04:28

## 2025-02-28 RX ADMIN — OXYCODONE HYDROCHLORIDE 5 MG: 5 TABLET ORAL at 04:40

## 2025-02-28 ASSESSMENT — PAIN DESCRIPTION - PAIN TYPE
TYPE: ACUTE PAIN
TYPE: CHRONIC PAIN

## 2025-02-28 NOTE — DISCHARGE PLANNING
"Medically cleared to discharge home.   Appreciate previous CM in arranging Oxygen, power and transportation.   [facility] pickup today, 2/28/2025, at 0900. Bedside and charge RN made aware and have prepared patient.   2nd IMM delivered to and signed by patient today, 2/28/2025, at 0810. Patient verbalized understanding of his rights as a Medicare patient, including his right to appeal. He states, \"My stay has been fantastic but I'm ready to go\".   Oxygen to be serviced with [facility] from [place].   Anticipating no further needs from case management prior to discharge.     Case Management Discharge Planning    Admission Date: 2/20/2025  GMLOS: 3  ALOS: 8    6-Clicks ADL Score: 24  6-Clicks Mobility Score: 19    Anticipated Discharge Dispo: Discharge Disposition: Discharged to home/self care (01)  Discharge Address: [address] [place] [id number]    DME Needed: Yes    DME Ordered: Yes    Action(s) Taken: Updated Provider/Nurse on Discharge Plan and Patient Conference    Escalations Completed: None    Medically Clear: Yes    Next Steps: Anticipating no further needs from case management prior to discharge.     Barriers to Discharge: None    Is the patient up for discharge tomorrow: Today, 2/28/2025, via [facility] Van at 0900.         "

## 2025-02-28 NOTE — CARE PLAN
The patient is Stable - Low risk of patient condition declining or worsening    Shift Goals  Clinical Goals: safety  Patient Goals: rest  Family Goals: silvia    Progress made toward(s) clinical / shift goals:  A&Ox2.  Disoriented to time and situation.  Legally blind.  RN assisted with dinner.  Denied any pain.  2L O2 via NC which is baseline.  Pt is on DAVID mattress.  Q2 turns.  Frame bed alarm is on.       Patient is not progressing towards the following goals:

## 2025-02-28 NOTE — PROGRESS NOTES
Pt discharged with GMT. All belongings on person. IV removed. AVS signed, copy with pt. All questions and concerns addressed prior to discharge.

## 2025-03-03 ENCOUNTER — PATIENT OUTREACH (OUTPATIENT)
Dept: HEALTH INFORMATION MANAGEMENT | Facility: OTHER | Age: 70
End: 2025-03-03
Payer: MEDICARE

## 2025-03-03 NOTE — PROGRESS NOTES
GLORIA Desai called and spoke with Devi ( HIPAA approved contact) and pt is doing well. Devi has no further questions or concerns.   Will discharge pt from [facility]- all goals met.